# Patient Record
Sex: FEMALE | Race: WHITE | NOT HISPANIC OR LATINO | Employment: OTHER | URBAN - METROPOLITAN AREA
[De-identification: names, ages, dates, MRNs, and addresses within clinical notes are randomized per-mention and may not be internally consistent; named-entity substitution may affect disease eponyms.]

---

## 2017-01-09 ENCOUNTER — TRANSCRIBE ORDERS (OUTPATIENT)
Dept: ADMINISTRATIVE | Facility: HOSPITAL | Age: 77
End: 2017-01-09

## 2017-01-09 DIAGNOSIS — Z12.31 OTHER SCREENING MAMMOGRAM: ICD-10-CM

## 2017-01-09 DIAGNOSIS — N64.4 BREAST PAIN, LEFT: Primary | ICD-10-CM

## 2017-01-24 ENCOUNTER — HOSPITAL ENCOUNTER (OUTPATIENT)
Dept: ULTRASOUND IMAGING | Facility: CLINIC | Age: 77
Discharge: HOME | End: 2017-01-24
Payer: MEDICARE

## 2017-01-24 ENCOUNTER — HOSPITAL ENCOUNTER (OUTPATIENT)
Dept: MAMMOGRAPHY | Facility: CLINIC | Age: 77
Discharge: HOME | End: 2017-01-24
Payer: MEDICARE

## 2017-01-24 DIAGNOSIS — N64.4 MASTODYNIA: ICD-10-CM

## 2017-01-24 DIAGNOSIS — Z12.31 OTHER SCREENING MAMMOGRAM: ICD-10-CM

## 2017-01-24 DIAGNOSIS — N64.4 BREAST PAIN, LEFT: ICD-10-CM

## 2017-01-24 DIAGNOSIS — Z12.31 ENCOUNTER FOR SCREENING MAMMOGRAM FOR MALIGNANT NEOPLASM OF BREAST: ICD-10-CM

## 2017-01-24 DIAGNOSIS — N63.0 BREAST LUMP: ICD-10-CM

## 2017-01-24 PROCEDURE — G0202 SCR MAMMO BI INCL CAD: HCPCS

## 2017-01-24 PROCEDURE — G0206 DX MAMMO INCL CAD UNI: HCPCS

## 2017-01-24 PROCEDURE — 76642 ULTRASOUND BREAST LIMITED: CPT

## 2017-01-31 ENCOUNTER — ALLSCRIPTS OFFICE VISIT (OUTPATIENT)
Dept: OTHER | Facility: OTHER | Age: 77
End: 2017-01-31

## 2017-02-20 ENCOUNTER — GENERIC CONVERSION - ENCOUNTER (OUTPATIENT)
Dept: OTHER | Facility: OTHER | Age: 77
End: 2017-02-20

## 2017-04-12 ENCOUNTER — ALLSCRIPTS OFFICE VISIT (OUTPATIENT)
Dept: OTHER | Facility: OTHER | Age: 77
End: 2017-04-12

## 2017-04-12 DIAGNOSIS — D64.9 ANEMIA: ICD-10-CM

## 2017-04-12 DIAGNOSIS — D50.9 IRON DEFICIENCY ANEMIA: ICD-10-CM

## 2017-04-12 DIAGNOSIS — K59.09 OTHER CONSTIPATION: ICD-10-CM

## 2017-08-23 ENCOUNTER — HOSPITAL ENCOUNTER (INPATIENT)
Facility: HOSPITAL | Age: 77
LOS: 8 days | Discharge: NURSING, REHAB OR LONG-TERM CARE | DRG: 885 | End: 2017-08-31
Attending: EMERGENCY MEDICINE | Admitting: PSYCHIATRY & NEUROLOGY
Payer: MEDICARE

## 2017-08-23 DIAGNOSIS — I48.91 ATRIAL FIBRILLATION (HCC): Chronic | ICD-10-CM

## 2017-08-23 DIAGNOSIS — R41.82 ALTERED MENTAL STATUS: Primary | ICD-10-CM

## 2017-08-23 DIAGNOSIS — F31.9 BIPOLAR DISORDER (HCC): Chronic | ICD-10-CM

## 2017-08-23 DIAGNOSIS — N39.0 URINARY TRACT INFECTION: ICD-10-CM

## 2017-08-23 LAB
ALBUMIN SERPL BCP-MCNC: 2.8 G/DL (ref 3.5–5)
ALP SERPL-CCNC: 61 U/L (ref 46–116)
ALT SERPL W P-5'-P-CCNC: 11 U/L (ref 12–78)
AMPHETAMINES SERPL QL SCN: NEGATIVE
ANION GAP SERPL CALCULATED.3IONS-SCNC: 9 MMOL/L (ref 4–13)
ANISOCYTOSIS BLD QL SMEAR: PRESENT
AST SERPL W P-5'-P-CCNC: 18 U/L (ref 5–45)
BACTERIA UR QL AUTO: ABNORMAL /HPF
BARBITURATES UR QL: NEGATIVE
BASOPHILS # BLD MANUAL: 0 THOUSAND/UL (ref 0–0.1)
BASOPHILS NFR MAR MANUAL: 0 % (ref 0–1)
BENZODIAZ UR QL: NEGATIVE
BILIRUB SERPL-MCNC: 0.27 MG/DL (ref 0.2–1)
BILIRUB UR QL STRIP: NEGATIVE
BUN SERPL-MCNC: 22 MG/DL (ref 5–25)
CALCIUM SERPL-MCNC: 9.2 MG/DL (ref 8.3–10.1)
CHLORIDE SERPL-SCNC: 105 MMOL/L (ref 100–108)
CLARITY UR: ABNORMAL
CO2 SERPL-SCNC: 24 MMOL/L (ref 21–32)
COCAINE UR QL: NEGATIVE
COLOR UR: YELLOW
COLOR, POC: NORMAL
CREAT SERPL-MCNC: 1 MG/DL (ref 0.6–1.3)
DIGOXIN SERPL-MCNC: 1.2 NG/ML (ref 0.8–2)
EOSINOPHIL # BLD MANUAL: 0.28 THOUSAND/UL (ref 0–0.4)
EOSINOPHIL NFR BLD MANUAL: 5 % (ref 0–6)
ERYTHROCYTE [DISTWIDTH] IN BLOOD BY AUTOMATED COUNT: 14 % (ref 11.6–15.1)
ETHANOL EXG-MCNC: 0 MG/DL
GFR SERPL CREATININE-BSD FRML MDRD: 54 ML/MIN/1.73SQ M
GLUCOSE SERPL-MCNC: 111 MG/DL (ref 65–140)
GLUCOSE UR STRIP-MCNC: NEGATIVE MG/DL
HCT VFR BLD AUTO: 40.9 % (ref 34.8–46.1)
HGB BLD-MCNC: 13.1 G/DL (ref 11.5–15.4)
HGB UR QL STRIP.AUTO: ABNORMAL
HYALINE CASTS #/AREA URNS LPF: ABNORMAL /LPF
KETONES UR STRIP-MCNC: ABNORMAL MG/DL
LEUKOCYTE ESTERASE UR QL STRIP: ABNORMAL
LYMPHOCYTES # BLD AUTO: 2.44 THOUSAND/UL (ref 0.6–4.47)
LYMPHOCYTES # BLD AUTO: 43 % (ref 14–44)
MCH RBC QN AUTO: 29.8 PG (ref 26.8–34.3)
MCHC RBC AUTO-ENTMCNC: 32 G/DL (ref 31.4–37.4)
MCV RBC AUTO: 93 FL (ref 82–98)
METHADONE UR QL: NEGATIVE
MONOCYTES # BLD AUTO: 0.4 THOUSAND/UL (ref 0–1.22)
MONOCYTES NFR BLD: 7 % (ref 4–12)
NEUTROPHILS # BLD MANUAL: 2.56 THOUSAND/UL (ref 1.85–7.62)
NEUTS SEG NFR BLD AUTO: 45 % (ref 43–75)
NITRITE UR QL STRIP: POSITIVE
NON-SQ EPI CELLS URNS QL MICRO: ABNORMAL /HPF
NRBC BLD AUTO-RTO: 0 /100 WBCS
OPIATES UR QL SCN: NEGATIVE
PCP UR QL: NEGATIVE
PH UR STRIP.AUTO: 6 [PH] (ref 4.5–8)
PLATELET # BLD AUTO: 174 THOUSANDS/UL (ref 149–390)
PLATELET BLD QL SMEAR: ADEQUATE
PMV BLD AUTO: 10 FL (ref 8.9–12.7)
POTASSIUM SERPL-SCNC: 4.4 MMOL/L (ref 3.5–5.3)
PROT SERPL-MCNC: 7.6 G/DL (ref 6.4–8.2)
PROT UR STRIP-MCNC: >=300 MG/DL
RBC # BLD AUTO: 4.4 MILLION/UL (ref 3.81–5.12)
RBC #/AREA URNS AUTO: ABNORMAL /HPF
SODIUM SERPL-SCNC: 138 MMOL/L (ref 136–145)
SP GR UR STRIP.AUTO: >=1.03 (ref 1–1.03)
THC UR QL: NEGATIVE
TOTAL CELLS COUNTED SPEC: 100
TSH SERPL DL<=0.05 MIU/L-ACNC: 5.6 UIU/ML (ref 0.36–3.74)
UROBILINOGEN UR QL STRIP.AUTO: 1 E.U./DL
WBC # BLD AUTO: 5.68 THOUSAND/UL (ref 4.31–10.16)
WBC #/AREA URNS AUTO: ABNORMAL /HPF

## 2017-08-23 PROCEDURE — 81001 URINALYSIS AUTO W/SCOPE: CPT

## 2017-08-23 PROCEDURE — 36415 COLL VENOUS BLD VENIPUNCTURE: CPT

## 2017-08-23 PROCEDURE — 80307 DRUG TEST PRSMV CHEM ANLYZR: CPT | Performed by: EMERGENCY MEDICINE

## 2017-08-23 PROCEDURE — 84443 ASSAY THYROID STIM HORMONE: CPT | Performed by: EMERGENCY MEDICINE

## 2017-08-23 PROCEDURE — 85007 BL SMEAR W/DIFF WBC COUNT: CPT | Performed by: EMERGENCY MEDICINE

## 2017-08-23 PROCEDURE — 93005 ELECTROCARDIOGRAM TRACING: CPT | Performed by: EMERGENCY MEDICINE

## 2017-08-23 PROCEDURE — 87077 CULTURE AEROBIC IDENTIFY: CPT

## 2017-08-23 PROCEDURE — 81002 URINALYSIS NONAUTO W/O SCOPE: CPT | Performed by: EMERGENCY MEDICINE

## 2017-08-23 PROCEDURE — 87086 URINE CULTURE/COLONY COUNT: CPT

## 2017-08-23 PROCEDURE — 96360 HYDRATION IV INFUSION INIT: CPT

## 2017-08-23 PROCEDURE — 99285 EMERGENCY DEPT VISIT HI MDM: CPT

## 2017-08-23 PROCEDURE — 85027 COMPLETE CBC AUTOMATED: CPT | Performed by: EMERGENCY MEDICINE

## 2017-08-23 PROCEDURE — 80053 COMPREHEN METABOLIC PANEL: CPT | Performed by: EMERGENCY MEDICINE

## 2017-08-23 PROCEDURE — 82075 ASSAY OF BREATH ETHANOL: CPT | Performed by: EMERGENCY MEDICINE

## 2017-08-23 PROCEDURE — 87186 SC STD MICRODIL/AGAR DIL: CPT

## 2017-08-23 PROCEDURE — 80162 ASSAY OF DIGOXIN TOTAL: CPT | Performed by: EMERGENCY MEDICINE

## 2017-08-23 RX ORDER — NITROFURANTOIN 25; 75 MG/1; MG/1
100 CAPSULE ORAL 2 TIMES DAILY WITH MEALS
Status: DISPENSED | OUTPATIENT
Start: 2017-08-23 | End: 2017-08-30

## 2017-08-23 RX ORDER — ONDANSETRON 4 MG/1
4 TABLET, FILM COATED ORAL EVERY 8 HOURS PRN
Status: ON HOLD | COMMUNITY
End: 2017-08-31

## 2017-08-23 RX ORDER — INSULIN GLARGINE 100 [IU]/ML
5 INJECTION, SOLUTION SUBCUTANEOUS
COMMUNITY
End: 2017-08-31 | Stop reason: HOSPADM

## 2017-08-23 RX ORDER — PROPRANOLOL HYDROCHLORIDE 10 MG/1
10 TABLET ORAL 2 TIMES DAILY
COMMUNITY
End: 2017-08-31 | Stop reason: HOSPADM

## 2017-08-23 RX ORDER — METHIMAZOLE 5 MG/1
2.5 TABLET ORAL 3 TIMES WEEKLY
COMMUNITY
End: 2017-08-31 | Stop reason: HOSPADM

## 2017-08-23 RX ORDER — RAMIPRIL 10 MG/1
10 CAPSULE ORAL DAILY
COMMUNITY
End: 2017-08-31 | Stop reason: HOSPADM

## 2017-08-23 RX ORDER — LORAZEPAM 0.5 MG/1
TABLET ORAL EVERY 12 HOURS PRN
COMMUNITY
End: 2017-08-31 | Stop reason: HOSPADM

## 2017-08-23 RX ORDER — FERROUS SULFATE 325(65) MG
325 TABLET ORAL
COMMUNITY
End: 2017-08-31 | Stop reason: HOSPADM

## 2017-08-23 RX ORDER — DIVALPROEX SODIUM 250 MG/1
250 TABLET, DELAYED RELEASE ORAL EVERY 12 HOURS SCHEDULED
COMMUNITY
End: 2017-08-31 | Stop reason: HOSPADM

## 2017-08-23 RX ORDER — LOPERAMIDE HYDROCHLORIDE 2 MG/1
2 CAPSULE ORAL AS NEEDED
Status: ON HOLD | COMMUNITY
End: 2017-08-31

## 2017-08-23 RX ADMIN — SODIUM CHLORIDE 1000 ML: 0.9 INJECTION, SOLUTION INTRAVENOUS at 15:45

## 2017-08-23 RX ADMIN — NITROFURANTOIN (MONOHYDRATE/MACROCRYSTALS) 100 MG: 75; 25 CAPSULE ORAL at 19:33

## 2017-08-24 PROBLEM — N39.0 URINARY TRACT INFECTION, ACUTE: Status: ACTIVE | Noted: 2017-08-24

## 2017-08-24 LAB
ALBUMIN SERPL BCP-MCNC: 2.6 G/DL (ref 3.5–5)
ALP SERPL-CCNC: 61 U/L (ref 46–116)
ALT SERPL W P-5'-P-CCNC: 13 U/L (ref 12–78)
ANION GAP SERPL CALCULATED.3IONS-SCNC: 6 MMOL/L (ref 4–13)
AST SERPL W P-5'-P-CCNC: 17 U/L (ref 5–45)
BASOPHILS # BLD AUTO: 0.02 THOUSANDS/ΜL (ref 0–0.1)
BASOPHILS NFR BLD AUTO: 0 % (ref 0–1)
BILIRUB SERPL-MCNC: 0.22 MG/DL (ref 0.2–1)
BUN SERPL-MCNC: 15 MG/DL (ref 5–25)
CALCIUM SERPL-MCNC: 8.6 MG/DL (ref 8.3–10.1)
CHLORIDE SERPL-SCNC: 108 MMOL/L (ref 100–108)
CO2 SERPL-SCNC: 24 MMOL/L (ref 21–32)
CREAT SERPL-MCNC: 0.66 MG/DL (ref 0.6–1.3)
EOSINOPHIL # BLD AUTO: 0.16 THOUSAND/ΜL (ref 0–0.61)
EOSINOPHIL NFR BLD AUTO: 3 % (ref 0–6)
ERYTHROCYTE [DISTWIDTH] IN BLOOD BY AUTOMATED COUNT: 13.6 % (ref 11.6–15.1)
GFR SERPL CREATININE-BSD FRML MDRD: 85 ML/MIN/1.73SQ M
GLUCOSE SERPL-MCNC: 129 MG/DL (ref 65–140)
GLUCOSE SERPL-MCNC: 174 MG/DL (ref 65–140)
GLUCOSE SERPL-MCNC: 177 MG/DL (ref 65–140)
GLUCOSE SERPL-MCNC: 182 MG/DL (ref 65–140)
GLUCOSE SERPL-MCNC: 194 MG/DL (ref 65–140)
GLUCOSE SERPL-MCNC: 213 MG/DL (ref 65–140)
HCT VFR BLD AUTO: 36.9 % (ref 34.8–46.1)
HGB BLD-MCNC: 11.9 G/DL (ref 11.5–15.4)
LYMPHOCYTES # BLD AUTO: 1.41 THOUSANDS/ΜL (ref 0.6–4.47)
LYMPHOCYTES NFR BLD AUTO: 25 % (ref 14–44)
MCH RBC QN AUTO: 29.3 PG (ref 26.8–34.3)
MCHC RBC AUTO-ENTMCNC: 32.2 G/DL (ref 31.4–37.4)
MCV RBC AUTO: 91 FL (ref 82–98)
MONOCYTES # BLD AUTO: 0.54 THOUSAND/ΜL (ref 0.17–1.22)
MONOCYTES NFR BLD AUTO: 9 % (ref 4–12)
NEUTROPHILS # BLD AUTO: 3.61 THOUSANDS/ΜL (ref 1.85–7.62)
NEUTS SEG NFR BLD AUTO: 63 % (ref 43–75)
NRBC BLD AUTO-RTO: 0 /100 WBCS
PLATELET # BLD AUTO: 165 THOUSANDS/UL (ref 149–390)
PMV BLD AUTO: 9.8 FL (ref 8.9–12.7)
POTASSIUM SERPL-SCNC: 4 MMOL/L (ref 3.5–5.3)
PROT SERPL-MCNC: 7 G/DL (ref 6.4–8.2)
RBC # BLD AUTO: 4.06 MILLION/UL (ref 3.81–5.12)
SODIUM SERPL-SCNC: 138 MMOL/L (ref 136–145)
TSH SERPL DL<=0.05 MIU/L-ACNC: 8.55 UIU/ML (ref 0.36–3.74)
WBC # BLD AUTO: 5.76 THOUSAND/UL (ref 4.31–10.16)

## 2017-08-24 PROCEDURE — 84443 ASSAY THYROID STIM HORMONE: CPT | Performed by: PSYCHIATRY & NEUROLOGY

## 2017-08-24 PROCEDURE — 80053 COMPREHEN METABOLIC PANEL: CPT | Performed by: PSYCHIATRY & NEUROLOGY

## 2017-08-24 PROCEDURE — 85025 COMPLETE CBC W/AUTO DIFF WBC: CPT | Performed by: PSYCHIATRY & NEUROLOGY

## 2017-08-24 PROCEDURE — 82948 REAGENT STRIP/BLOOD GLUCOSE: CPT

## 2017-08-24 RX ORDER — DIGOXIN 125 MCG
125 TABLET ORAL DAILY
Status: DISCONTINUED | OUTPATIENT
Start: 2017-08-24 | End: 2017-08-31 | Stop reason: HOSPADM

## 2017-08-24 RX ORDER — RAMIPRIL 5 MG/1
10 CAPSULE ORAL DAILY
Status: DISCONTINUED | OUTPATIENT
Start: 2017-08-24 | End: 2017-08-31 | Stop reason: HOSPADM

## 2017-08-24 RX ORDER — FERROUS SULFATE 325(65) MG
325 TABLET ORAL
Status: DISCONTINUED | OUTPATIENT
Start: 2017-08-24 | End: 2017-08-31 | Stop reason: HOSPADM

## 2017-08-24 RX ORDER — ACETAMINOPHEN 325 MG/1
650 TABLET ORAL EVERY 4 HOURS PRN
Status: DISCONTINUED | OUTPATIENT
Start: 2017-08-24 | End: 2017-08-31 | Stop reason: HOSPADM

## 2017-08-24 RX ORDER — DIVALPROEX SODIUM 500 MG/1
500 TABLET, EXTENDED RELEASE ORAL 2 TIMES DAILY
Status: DISCONTINUED | OUTPATIENT
Start: 2017-08-24 | End: 2017-08-24

## 2017-08-24 RX ORDER — LORAZEPAM 1 MG/1
1 TABLET ORAL EVERY 4 HOURS PRN
Status: DISCONTINUED | OUTPATIENT
Start: 2017-08-24 | End: 2017-08-31 | Stop reason: HOSPADM

## 2017-08-24 RX ORDER — PROPRANOLOL HYDROCHLORIDE 10 MG/1
10 TABLET ORAL 2 TIMES DAILY
Status: DISCONTINUED | OUTPATIENT
Start: 2017-08-24 | End: 2017-08-31 | Stop reason: HOSPADM

## 2017-08-24 RX ORDER — MAGNESIUM HYDROXIDE/ALUMINUM HYDROXICE/SIMETHICONE 120; 1200; 1200 MG/30ML; MG/30ML; MG/30ML
30 SUSPENSION ORAL EVERY 4 HOURS PRN
Status: DISCONTINUED | OUTPATIENT
Start: 2017-08-24 | End: 2017-08-31 | Stop reason: HOSPADM

## 2017-08-24 RX ORDER — HALOPERIDOL 5 MG
5 TABLET ORAL EVERY 8 HOURS PRN
Status: DISCONTINUED | OUTPATIENT
Start: 2017-08-24 | End: 2017-08-31 | Stop reason: HOSPADM

## 2017-08-24 RX ORDER — METHIMAZOLE 5 MG/1
2.5 TABLET ORAL 3 TIMES WEEKLY
Status: DISCONTINUED | OUTPATIENT
Start: 2017-08-24 | End: 2017-08-31 | Stop reason: HOSPADM

## 2017-08-24 RX ORDER — DIVALPROEX SODIUM 125 MG/1
500 CAPSULE, COATED PELLETS ORAL EVERY 12 HOURS SCHEDULED
Status: DISCONTINUED | OUTPATIENT
Start: 2017-08-24 | End: 2017-08-27

## 2017-08-24 RX ORDER — OLANZAPINE 10 MG/1
10 INJECTION, POWDER, LYOPHILIZED, FOR SOLUTION INTRAMUSCULAR 2 TIMES DAILY PRN
Status: DISCONTINUED | OUTPATIENT
Start: 2017-08-24 | End: 2017-08-31 | Stop reason: HOSPADM

## 2017-08-24 RX ORDER — INSULIN GLARGINE 100 [IU]/ML
5 INJECTION, SOLUTION SUBCUTANEOUS
Status: DISCONTINUED | OUTPATIENT
Start: 2017-08-24 | End: 2017-08-31 | Stop reason: HOSPADM

## 2017-08-24 RX ORDER — BENZTROPINE MESYLATE 1 MG/ML
1 INJECTION INTRAMUSCULAR; INTRAVENOUS EVERY 8 HOURS PRN
Status: DISCONTINUED | OUTPATIENT
Start: 2017-08-24 | End: 2017-08-31 | Stop reason: HOSPADM

## 2017-08-24 RX ORDER — BENZTROPINE MESYLATE 1 MG/1
1 TABLET ORAL EVERY 6 HOURS PRN
Status: DISCONTINUED | OUTPATIENT
Start: 2017-08-24 | End: 2017-08-31 | Stop reason: HOSPADM

## 2017-08-24 RX ORDER — OLANZAPINE 5 MG/1
5 TABLET ORAL
Status: DISCONTINUED | OUTPATIENT
Start: 2017-08-24 | End: 2017-08-25

## 2017-08-24 RX ADMIN — RAMIPRIL 10 MG: 5 CAPSULE ORAL at 08:49

## 2017-08-24 RX ADMIN — NITROFURANTOIN (MONOHYDRATE/MACROCRYSTALS) 100 MG: 75; 25 CAPSULE ORAL at 10:07

## 2017-08-24 RX ADMIN — Medication 325 MG: at 08:50

## 2017-08-24 RX ADMIN — RIVAROXABAN 20 MG: 20 TABLET, FILM COATED ORAL at 08:50

## 2017-08-24 RX ADMIN — PROPRANOLOL HYDROCHLORIDE 10 MG: 10 TABLET ORAL at 17:18

## 2017-08-24 RX ADMIN — DIVALPROEX SODIUM 500 MG: 125 CAPSULE, COATED PELLETS ORAL at 10:08

## 2017-08-24 RX ADMIN — OLANZAPINE 5 MG: 5 TABLET, FILM COATED ORAL at 01:11

## 2017-08-24 RX ADMIN — INSULIN GLARGINE 5 UNITS: 100 INJECTION, SOLUTION SUBCUTANEOUS at 01:15

## 2017-08-24 RX ADMIN — INSULIN GLARGINE 5 UNITS: 100 INJECTION, SOLUTION SUBCUTANEOUS at 21:46

## 2017-08-24 RX ADMIN — METHIMAZOLE 2.5 MG: 5 TABLET ORAL at 01:11

## 2017-08-24 RX ADMIN — NITROFURANTOIN (MONOHYDRATE/MACROCRYSTALS) 100 MG: 75; 25 CAPSULE ORAL at 17:18

## 2017-08-24 RX ADMIN — METFORMIN HYDROCHLORIDE 1000 MG: 500 TABLET, FILM COATED ORAL at 08:49

## 2017-08-24 RX ADMIN — DIGOXIN 125 MCG: 0.12 TABLET ORAL at 08:49

## 2017-08-24 RX ADMIN — PROPRANOLOL HYDROCHLORIDE 10 MG: 10 TABLET ORAL at 08:49

## 2017-08-24 RX ADMIN — METFORMIN HYDROCHLORIDE 1000 MG: 500 TABLET, FILM COATED ORAL at 17:18

## 2017-08-25 LAB
BACTERIA UR CULT: NORMAL
GLUCOSE SERPL-MCNC: 138 MG/DL (ref 65–140)
GLUCOSE SERPL-MCNC: 155 MG/DL (ref 65–140)
GLUCOSE SERPL-MCNC: 161 MG/DL (ref 65–140)
GLUCOSE SERPL-MCNC: 193 MG/DL (ref 65–140)

## 2017-08-25 PROCEDURE — 82948 REAGENT STRIP/BLOOD GLUCOSE: CPT

## 2017-08-25 RX ADMIN — METFORMIN HYDROCHLORIDE 1000 MG: 500 TABLET, FILM COATED ORAL at 17:15

## 2017-08-25 RX ADMIN — DIVALPROEX SODIUM 500 MG: 125 CAPSULE, COATED PELLETS ORAL at 21:11

## 2017-08-25 RX ADMIN — PROPRANOLOL HYDROCHLORIDE 10 MG: 10 TABLET ORAL at 17:14

## 2017-08-25 RX ADMIN — NITROFURANTOIN (MONOHYDRATE/MACROCRYSTALS) 100 MG: 75; 25 CAPSULE ORAL at 17:15

## 2017-08-25 RX ADMIN — INSULIN GLARGINE 5 UNITS: 100 INJECTION, SOLUTION SUBCUTANEOUS at 21:11

## 2017-08-26 PROBLEM — F02.81 LATE ONSET ALZHEIMER'S DISEASE WITH BEHAVIORAL DISTURBANCE (HCC): Chronic | Status: ACTIVE | Noted: 2017-08-26

## 2017-08-26 PROBLEM — G30.1 LATE ONSET ALZHEIMER'S DISEASE WITH BEHAVIORAL DISTURBANCE (HCC): Chronic | Status: ACTIVE | Noted: 2017-08-26

## 2017-08-26 PROBLEM — F02.818 LATE ONSET ALZHEIMER'S DISEASE WITH BEHAVIORAL DISTURBANCE (HCC): Chronic | Status: ACTIVE | Noted: 2017-08-26

## 2017-08-26 LAB
GLUCOSE SERPL-MCNC: 130 MG/DL (ref 65–140)
GLUCOSE SERPL-MCNC: 188 MG/DL (ref 65–140)
GLUCOSE SERPL-MCNC: 198 MG/DL (ref 65–140)
GLUCOSE SERPL-MCNC: 202 MG/DL (ref 65–140)
VALPROATE SERPL-MCNC: 63 UG/ML (ref 50–100)

## 2017-08-26 PROCEDURE — 92610 EVALUATE SWALLOWING FUNCTION: CPT

## 2017-08-26 PROCEDURE — 82948 REAGENT STRIP/BLOOD GLUCOSE: CPT

## 2017-08-26 PROCEDURE — 80164 ASSAY DIPROPYLACETIC ACD TOT: CPT | Performed by: PSYCHIATRY & NEUROLOGY

## 2017-08-26 RX ADMIN — RAMIPRIL 10 MG: 5 CAPSULE ORAL at 08:05

## 2017-08-26 RX ADMIN — NITROFURANTOIN (MONOHYDRATE/MACROCRYSTALS) 100 MG: 75; 25 CAPSULE ORAL at 08:04

## 2017-08-26 RX ADMIN — Medication 325 MG: at 08:04

## 2017-08-26 RX ADMIN — NITROFURANTOIN (MONOHYDRATE/MACROCRYSTALS) 100 MG: 75; 25 CAPSULE ORAL at 17:32

## 2017-08-26 RX ADMIN — METFORMIN HYDROCHLORIDE 1000 MG: 500 TABLET, FILM COATED ORAL at 17:32

## 2017-08-26 RX ADMIN — DIVALPROEX SODIUM 500 MG: 125 CAPSULE, COATED PELLETS ORAL at 08:03

## 2017-08-26 RX ADMIN — PROPRANOLOL HYDROCHLORIDE 10 MG: 10 TABLET ORAL at 17:32

## 2017-08-26 RX ADMIN — RIVAROXABAN 20 MG: 20 TABLET, FILM COATED ORAL at 08:04

## 2017-08-26 RX ADMIN — DIGOXIN 125 MCG: 0.12 TABLET ORAL at 08:04

## 2017-08-26 RX ADMIN — METFORMIN HYDROCHLORIDE 1000 MG: 500 TABLET, FILM COATED ORAL at 08:04

## 2017-08-26 RX ADMIN — ACETAMINOPHEN 650 MG: 325 TABLET, FILM COATED ORAL at 08:01

## 2017-08-26 RX ADMIN — INSULIN GLARGINE 5 UNITS: 100 INJECTION, SOLUTION SUBCUTANEOUS at 21:22

## 2017-08-26 RX ADMIN — DIVALPROEX SODIUM 500 MG: 125 CAPSULE, COATED PELLETS ORAL at 21:22

## 2017-08-27 LAB
GLUCOSE SERPL-MCNC: 187 MG/DL (ref 65–140)
GLUCOSE SERPL-MCNC: 207 MG/DL (ref 65–140)
GLUCOSE SERPL-MCNC: 237 MG/DL (ref 65–140)

## 2017-08-27 PROCEDURE — 82948 REAGENT STRIP/BLOOD GLUCOSE: CPT

## 2017-08-27 RX ORDER — DIVALPROEX SODIUM 125 MG/1
250 CAPSULE, COATED PELLETS ORAL EVERY 12 HOURS SCHEDULED
Status: DISCONTINUED | OUTPATIENT
Start: 2017-08-27 | End: 2017-08-28

## 2017-08-27 RX ADMIN — METFORMIN HYDROCHLORIDE 1000 MG: 500 TABLET, FILM COATED ORAL at 08:33

## 2017-08-27 RX ADMIN — RIVAROXABAN 20 MG: 20 TABLET, FILM COATED ORAL at 08:34

## 2017-08-27 RX ADMIN — NITROFURANTOIN (MONOHYDRATE/MACROCRYSTALS) 100 MG: 75; 25 CAPSULE ORAL at 08:34

## 2017-08-27 RX ADMIN — NITROFURANTOIN (MONOHYDRATE/MACROCRYSTALS) 100 MG: 75; 25 CAPSULE ORAL at 17:37

## 2017-08-27 RX ADMIN — DIVALPROEX SODIUM 250 MG: 125 CAPSULE, COATED PELLETS ORAL at 21:06

## 2017-08-27 RX ADMIN — METFORMIN HYDROCHLORIDE 1000 MG: 500 TABLET, FILM COATED ORAL at 17:37

## 2017-08-27 RX ADMIN — PROPRANOLOL HYDROCHLORIDE 10 MG: 10 TABLET ORAL at 17:37

## 2017-08-27 RX ADMIN — DIVALPROEX SODIUM 500 MG: 125 CAPSULE, COATED PELLETS ORAL at 08:33

## 2017-08-27 RX ADMIN — Medication 325 MG: at 08:34

## 2017-08-27 RX ADMIN — INSULIN GLARGINE 5 UNITS: 100 INJECTION, SOLUTION SUBCUTANEOUS at 21:10

## 2017-08-27 RX ADMIN — DIGOXIN 125 MCG: 0.12 TABLET ORAL at 08:33

## 2017-08-28 LAB
GLUCOSE SERPL-MCNC: 147 MG/DL (ref 65–140)
GLUCOSE SERPL-MCNC: 212 MG/DL (ref 65–140)
GLUCOSE SERPL-MCNC: 232 MG/DL (ref 65–140)
GLUCOSE SERPL-MCNC: 241 MG/DL (ref 65–140)

## 2017-08-28 PROCEDURE — 82948 REAGENT STRIP/BLOOD GLUCOSE: CPT

## 2017-08-28 RX ORDER — DIVALPROEX SODIUM 125 MG/1
125 CAPSULE, COATED PELLETS ORAL EVERY 12 HOURS SCHEDULED
Status: DISCONTINUED | OUTPATIENT
Start: 2017-08-28 | End: 2017-08-29

## 2017-08-28 RX ADMIN — DIVALPROEX SODIUM 250 MG: 125 CAPSULE, COATED PELLETS ORAL at 08:10

## 2017-08-28 RX ADMIN — METFORMIN HYDROCHLORIDE 1000 MG: 500 TABLET, FILM COATED ORAL at 16:57

## 2017-08-28 RX ADMIN — Medication 325 MG: at 08:11

## 2017-08-28 RX ADMIN — METFORMIN HYDROCHLORIDE 1000 MG: 500 TABLET, FILM COATED ORAL at 08:11

## 2017-08-28 RX ADMIN — RAMIPRIL 10 MG: 5 CAPSULE ORAL at 08:09

## 2017-08-28 RX ADMIN — PROPRANOLOL HYDROCHLORIDE 10 MG: 10 TABLET ORAL at 17:00

## 2017-08-28 RX ADMIN — DIVALPROEX SODIUM 125 MG: 125 CAPSULE, COATED PELLETS ORAL at 21:13

## 2017-08-28 RX ADMIN — METHIMAZOLE 2.5 MG: 5 TABLET ORAL at 08:10

## 2017-08-28 RX ADMIN — NITROFURANTOIN (MONOHYDRATE/MACROCRYSTALS) 100 MG: 75; 25 CAPSULE ORAL at 16:59

## 2017-08-28 RX ADMIN — RIVAROXABAN 20 MG: 20 TABLET, FILM COATED ORAL at 08:10

## 2017-08-28 RX ADMIN — NITROFURANTOIN (MONOHYDRATE/MACROCRYSTALS) 100 MG: 75; 25 CAPSULE ORAL at 08:09

## 2017-08-28 RX ADMIN — INSULIN GLARGINE 5 UNITS: 100 INJECTION, SOLUTION SUBCUTANEOUS at 21:12

## 2017-08-28 RX ADMIN — DIGOXIN 125 MCG: 0.12 TABLET ORAL at 08:10

## 2017-08-28 RX ADMIN — PROPRANOLOL HYDROCHLORIDE 10 MG: 10 TABLET ORAL at 08:11

## 2017-08-29 ENCOUNTER — APPOINTMENT (INPATIENT)
Dept: RADIOLOGY | Facility: HOSPITAL | Age: 77
DRG: 885 | End: 2017-08-29
Payer: MEDICARE

## 2017-08-29 LAB
GLUCOSE SERPL-MCNC: 177 MG/DL (ref 65–140)
GLUCOSE SERPL-MCNC: 196 MG/DL (ref 65–140)
GLUCOSE SERPL-MCNC: 209 MG/DL (ref 65–140)
GLUCOSE SERPL-MCNC: 213 MG/DL (ref 65–140)

## 2017-08-29 PROCEDURE — 82948 REAGENT STRIP/BLOOD GLUCOSE: CPT

## 2017-08-29 PROCEDURE — 70551 MRI BRAIN STEM W/O DYE: CPT

## 2017-08-29 RX ADMIN — METFORMIN HYDROCHLORIDE 1000 MG: 500 TABLET, FILM COATED ORAL at 16:55

## 2017-08-29 RX ADMIN — DIGOXIN 125 MCG: 0.12 TABLET ORAL at 08:43

## 2017-08-29 RX ADMIN — INSULIN GLARGINE 5 UNITS: 100 INJECTION, SOLUTION SUBCUTANEOUS at 21:24

## 2017-08-29 RX ADMIN — ACETAMINOPHEN 650 MG: 325 TABLET, FILM COATED ORAL at 17:04

## 2017-08-29 RX ADMIN — Medication 325 MG: at 08:44

## 2017-08-29 RX ADMIN — PROPRANOLOL HYDROCHLORIDE 10 MG: 10 TABLET ORAL at 08:44

## 2017-08-29 RX ADMIN — PROPRANOLOL HYDROCHLORIDE 10 MG: 10 TABLET ORAL at 17:01

## 2017-08-29 RX ADMIN — NITROFURANTOIN (MONOHYDRATE/MACROCRYSTALS) 100 MG: 75; 25 CAPSULE ORAL at 16:55

## 2017-08-29 RX ADMIN — RAMIPRIL 10 MG: 5 CAPSULE ORAL at 08:44

## 2017-08-29 RX ADMIN — NITROFURANTOIN (MONOHYDRATE/MACROCRYSTALS) 100 MG: 75; 25 CAPSULE ORAL at 08:44

## 2017-08-29 RX ADMIN — DIVALPROEX SODIUM 125 MG: 125 CAPSULE, COATED PELLETS ORAL at 08:47

## 2017-08-29 RX ADMIN — METFORMIN HYDROCHLORIDE 1000 MG: 500 TABLET, FILM COATED ORAL at 08:44

## 2017-08-29 RX ADMIN — RIVAROXABAN 20 MG: 20 TABLET, FILM COATED ORAL at 08:44

## 2017-08-30 LAB
GLUCOSE SERPL-MCNC: 195 MG/DL (ref 65–140)
GLUCOSE SERPL-MCNC: 236 MG/DL (ref 65–140)
GLUCOSE SERPL-MCNC: 254 MG/DL (ref 65–140)
GLUCOSE SERPL-MCNC: 359 MG/DL (ref 65–140)

## 2017-08-30 PROCEDURE — 82948 REAGENT STRIP/BLOOD GLUCOSE: CPT

## 2017-08-30 RX ADMIN — DIGOXIN 125 MCG: 0.12 TABLET ORAL at 09:08

## 2017-08-30 RX ADMIN — RIVAROXABAN 20 MG: 20 TABLET, FILM COATED ORAL at 09:08

## 2017-08-30 RX ADMIN — Medication 325 MG: at 09:08

## 2017-08-30 RX ADMIN — METHIMAZOLE 2.5 MG: 5 TABLET ORAL at 09:24

## 2017-08-30 RX ADMIN — METFORMIN HYDROCHLORIDE 1000 MG: 500 TABLET, FILM COATED ORAL at 17:29

## 2017-08-30 RX ADMIN — PROPRANOLOL HYDROCHLORIDE 10 MG: 10 TABLET ORAL at 17:29

## 2017-08-30 RX ADMIN — PROPRANOLOL HYDROCHLORIDE 10 MG: 10 TABLET ORAL at 09:08

## 2017-08-30 RX ADMIN — METFORMIN HYDROCHLORIDE 1000 MG: 500 TABLET, FILM COATED ORAL at 09:08

## 2017-08-30 RX ADMIN — INSULIN GLARGINE 5 UNITS: 100 INJECTION, SOLUTION SUBCUTANEOUS at 21:43

## 2017-08-30 RX ADMIN — RAMIPRIL 10 MG: 5 CAPSULE ORAL at 09:07

## 2017-08-31 VITALS
TEMPERATURE: 96.7 F | HEIGHT: 69 IN | RESPIRATION RATE: 17 BRPM | WEIGHT: 168 LBS | BODY MASS INDEX: 24.88 KG/M2 | HEART RATE: 110 BPM | DIASTOLIC BLOOD PRESSURE: 62 MMHG | SYSTOLIC BLOOD PRESSURE: 141 MMHG | OXYGEN SATURATION: 94 %

## 2017-08-31 LAB — GLUCOSE SERPL-MCNC: 201 MG/DL (ref 65–140)

## 2017-08-31 PROCEDURE — 82948 REAGENT STRIP/BLOOD GLUCOSE: CPT

## 2017-08-31 RX ORDER — INSULIN GLARGINE 100 [IU]/ML
5 INJECTION, SOLUTION SUBCUTANEOUS
Qty: 10 ML | Refills: 0 | Status: SHIPPED | OUTPATIENT
Start: 2017-08-31 | End: 2018-04-25

## 2017-08-31 RX ORDER — RAMIPRIL 10 MG/1
10 CAPSULE ORAL DAILY
Qty: 30 CAPSULE | Refills: 0 | Status: SHIPPED | OUTPATIENT
Start: 2017-08-31 | End: 2017-12-17

## 2017-08-31 RX ORDER — FERROUS SULFATE 325(65) MG
325 TABLET ORAL
Qty: 30 TABLET | Refills: 0 | Status: SHIPPED | OUTPATIENT
Start: 2017-08-31 | End: 2022-03-31

## 2017-08-31 RX ORDER — DIPHENOXYLATE HYDROCHLORIDE AND ATROPINE SULFATE 2.5; .025 MG/1; MG/1
1 TABLET ORAL DAILY
Qty: 30 TABLET | Refills: 0 | Status: SHIPPED | OUTPATIENT
Start: 2017-08-31 | End: 2021-04-20 | Stop reason: ALTCHOICE

## 2017-08-31 RX ORDER — LOPERAMIDE HYDROCHLORIDE 2 MG/1
2 CAPSULE ORAL AS NEEDED
Qty: 30 CAPSULE | Refills: 0 | Status: SHIPPED | OUTPATIENT
Start: 2017-08-31 | End: 2017-09-30

## 2017-08-31 RX ORDER — LORAZEPAM 1 MG/1
1 TABLET ORAL 2 TIMES DAILY PRN
Qty: 60 TABLET | Refills: 0 | Status: SHIPPED | OUTPATIENT
Start: 2017-08-31 | End: 2017-12-17

## 2017-08-31 RX ORDER — METHIMAZOLE 5 MG/1
2.5 TABLET ORAL 3 TIMES WEEKLY
Qty: 6 TABLET | Refills: 0 | Status: SHIPPED | OUTPATIENT
Start: 2017-09-01 | End: 2018-04-25

## 2017-08-31 RX ORDER — ONDANSETRON 4 MG/1
4 TABLET, FILM COATED ORAL EVERY 8 HOURS PRN
Qty: 30 TABLET | Refills: 0 | Status: SHIPPED | OUTPATIENT
Start: 2017-08-31 | End: 2021-04-20 | Stop reason: ALTCHOICE

## 2017-08-31 RX ORDER — PROPRANOLOL HYDROCHLORIDE 10 MG/1
10 TABLET ORAL 2 TIMES DAILY
Qty: 60 TABLET | Refills: 0 | Status: SHIPPED | OUTPATIENT
Start: 2017-08-31 | End: 2017-12-17

## 2017-08-31 RX ORDER — ACETAMINOPHEN 325 MG/1
325 TABLET ORAL EVERY 4 HOURS PRN
Qty: 120 TABLET | Refills: 0 | Status: SHIPPED | OUTPATIENT
Start: 2017-08-31 | End: 2017-09-30

## 2017-08-31 RX ORDER — DIGOXIN 125 MCG
125 TABLET ORAL DAILY
Qty: 30 TABLET | Refills: 0 | Status: SHIPPED | OUTPATIENT
Start: 2017-08-31 | End: 2018-04-25

## 2017-08-31 RX ADMIN — RIVAROXABAN 20 MG: 20 TABLET, FILM COATED ORAL at 08:59

## 2017-08-31 RX ADMIN — Medication 325 MG: at 08:59

## 2017-08-31 RX ADMIN — PROPRANOLOL HYDROCHLORIDE 10 MG: 10 TABLET ORAL at 08:59

## 2017-08-31 RX ADMIN — DIGOXIN 125 MCG: 0.12 TABLET ORAL at 08:59

## 2017-08-31 RX ADMIN — RAMIPRIL 10 MG: 5 CAPSULE ORAL at 08:59

## 2017-08-31 RX ADMIN — METFORMIN HYDROCHLORIDE 1000 MG: 500 TABLET, FILM COATED ORAL at 09:00

## 2017-11-09 ENCOUNTER — GENERIC CONVERSION - ENCOUNTER (OUTPATIENT)
Dept: OTHER | Facility: OTHER | Age: 77
End: 2017-11-09

## 2017-11-27 ENCOUNTER — ALLSCRIPTS OFFICE VISIT (OUTPATIENT)
Dept: OTHER | Facility: OTHER | Age: 77
End: 2017-11-27

## 2017-11-27 DIAGNOSIS — N39.0 URINARY TRACT INFECTION: ICD-10-CM

## 2017-11-28 NOTE — CONSULTS
Assessment    1  Urinary retention (788 20) (R33 9)   2  UTI (urinary tract infection) (599 0) (N39 0)    Plan  Urinary retention    · Follow-up visit in 3 months Evaluation and Treatment  Follow-up  Status: Hold For -Scheduling  Requested for: 27Nov2017   Ordered;Urinary retention; Ordered By: Charlie Larkin Performed:  Due: [id number]   · *1- [facility] INTERVENTIONAL RADIOLOGY Co-Management  *Requests placement of suprapubic tube  Status: Hold For - Scheduling Requested for: [id number]   Ordered;Urinary retention; Ordered By: Charlie Larkin Performed:  Due: [id number]  Care Summary provided  : Yes  UTI (urinary tract infection)    · [facility]; Status:Hold For - Scheduling; Requestedfor:27Nov2017;    Perform:[facility]; AUN:[id number]; Ordered;(urinary tract infection); Ordered By:Kodi Bruno; Discussion/Summary  Discussion Summary:   My impression is urinary retention, likely diabetic neuropathy, recurrent UTI  recommend obtaining an ultrasound of the kidneys and bladder to exclude any underlying pathology or kidney stones  I also recommend a referral to interventional Radiology for placement of a suprapubic tube with local anesthesia and sedation  Myrbetriq 25 mg daily can be prescribed for spasms  I would avoid anticholinergics secondary to her history of dementia  Follow-up will be approximately 6 weeks after the suprapubic tube has been placed at which time the suprapubic tube will be upsized until a 24 [employer] Nohelia tube is in place  Self Referrals:   Self Referrals: No Hospital f/u      Chief Complaint  Chief Complaint Free Text Note Form: patient presents for retention; has pantoja      History of Present Illness  HPI: Talia Tom is a 23-year-old female who unfortunately has dimension resides in a nursing facility  Her daughter who is a occupational therapist at [facility] [employer] [facility] [employer] is present with her in the office today  Her catheter has been in place for the last few months   She has had recurrent urinary tract infections  She has a longstanding history of diabetes and may have diabetic neuropathy  She has failed multiple voiding trials at the nursing facility  She now appears to be complaining of bladder pain and possibly bladder spasm  medical and surgical history is remarkable for urinary retention, diabetes, recurrent urinary tract infection, dementia  She is status post hysterectomy via a Pfannenstiel incision  surgical, family, and social histories were reviewed in [employer]  Review of Systems  Complete-Female Urology:  Constitutional: No fever, no chills, feels well, no tiredness, no recent weight gain or weight loss  Respiratory: No complaints of shortness of breath, no wheezing, no cough, no SOB on exertion, no orthopnea, no PND  Cardiovascular: No complaints of slow heart rate, no fast heart rate, no chest pain, no palpitations, no leg claudication, no lower extremity edema  Gastrointestinal: No complaints of abdominal pain, no constipation, no nausea or vomiting, no diarrhea, no bloody stools  Genitourinary: as noted in HPI  Musculoskeletal: No complaints of arthralgias, no myalgias, no joint swelling or stiffness, no limb pain or swelling  Integumentary: No complaints of skin rash or lesions, no itching, no skin wounds, no breast pain or lump  Hematologic/Lymphatic: No complaints of swollen glands, no swollen glands in the neck, does not bleed easily, does not bruise easily  Neurological: No complaints of headache, no confusion, no convulsions, no numbness, no dizziness or fainting, no tingling, no limb weakness, no difficulty walking  Active Problems    1  Anemia (285 9) (D64 9)   2  Breast nodule (793 89) (N63 0)   3  Breast pain, left (611 71) (N64 4)   4  Chronic constipation (564 00) (K59 09)   5  Encounter for screening mammogram for malignant neoplasm of breast (V76 12) (Z12 31)   6  Iron deficiency anemia (280 9) (D50 9)   7   Urinary retention (788 20) (R33 9)    Past Medical History  Active Problems And Past Medical History Reviewed: The active problems and past medical history were reviewed and updated today  Surgical History  Surgical History Reviewed: The surgical history was reviewed and updated today  Family History  Family History Reviewed: The family history was reviewed and updated today  Social History  Social History Reviewed: The social history was reviewed and updated today  The social history was reviewed and is unchanged  Current Meds   1  Acetaminophen 120 MG Rectal Suppository; INSERT 1 SUPPOSITORY RECTALLY EVERY 4 TO 6 HOURS AS NEEDED; Therapy: [id number] to Recorded   2  Acetaminophen 325 MG Oral Tablet; TAKE 1 TO 2 TABLETS EVERY 4 HOURS AS NEEDED; Therapy: [id number] to Recorded   3  Bisacodyl 10 MG Rectal Suppository; USE AS DIRECTED; Therapy: 05JZS3468 to Recorded   4  Bisacodyl EC 5 MG Oral Tablet Delayed Release; TAKE 1 TABLET DAILY AS NEEDED; Therapy: 74VWV5334 to Recorded   5  Digoxin 0 25 MG/ML Injection Solution; USE AS DIRECTED; Therapy: 19OWC0405 to Recorded   6  MethIMAzole 10 MG Oral Tablet; TAKE 12 TABLET Daily; Therapy: 32CYM1726 to (Evaluate:02Qnw7053) Recorded   7  Ondansetron 4 MG Oral Tablet Disintegrating; TAKE 1 TABLET Every 6 hours PRN nausea; Therapy: 86BPG3690 to Recorded   8  Senna Plus 8 6-50 MG Oral Tablet; TAKE 1 OR 2 TABLETS DAILY TO PREVENT CONSTIPATION; Therapy: 28EKQ2399 to Recorded   9  Voltaren 1 % Transdermal Gel; APPLY SPARINGLY TO AFFECTED AREA(S) ONCE DAILY; Therapy: 03YQO1474 to Recorded   10  Xarelto 20 MG Oral Tablet; Take 1 tablet daily; Therapy: 61SEQ1005 to Recorded    Vitals  Vital Signs    Recorded: 70KNE9998 10:41AM   Heart Rate 68   Systolic 797   Diastolic 80   Height Unobtainable Yes   Weight Unobtainable Yes       Physical Exam   Additional Exam:  On examination she is in no acute distress  She show signs of dementia  She is on a stretcher  Her abdomen is soft nontender nondistended  The bladder is nondistended  A small umbilical hernia is noted  A Pfannenstiel scars appreciated  A Agarwal catheter is in place draining clear yellow urine  There are no other abnormalities on examination noted        Signatures   Electronically signed by : Kathe Oviedo MD; Nov 27 2017 11:39AM EST                       (Author)

## 2017-12-12 RX ORDER — SODIUM CHLORIDE 9 MG/ML
75 INJECTION, SOLUTION INTRAVENOUS CONTINUOUS
Status: CANCELLED | OUTPATIENT
Start: 2017-12-18

## 2017-12-13 ENCOUNTER — TELEPHONE (OUTPATIENT)
Dept: RADIOLOGY | Facility: HOSPITAL | Age: 77
End: 2017-12-13

## 2017-12-15 ENCOUNTER — GENERIC CONVERSION - ENCOUNTER (OUTPATIENT)
Dept: OTHER | Facility: OTHER | Age: 77
End: 2017-12-15

## 2017-12-17 ENCOUNTER — APPOINTMENT (EMERGENCY)
Dept: RADIOLOGY | Facility: HOSPITAL | Age: 77
End: 2017-12-17
Payer: MEDICARE

## 2017-12-17 ENCOUNTER — HOSPITAL ENCOUNTER (EMERGENCY)
Facility: HOSPITAL | Age: 77
Discharge: NURSING, REHAB OR LONG-TERM CARE | End: 2017-12-17
Attending: EMERGENCY MEDICINE | Admitting: EMERGENCY MEDICINE
Payer: MEDICARE

## 2017-12-17 VITALS
HEART RATE: 85 BPM | OXYGEN SATURATION: 99 % | DIASTOLIC BLOOD PRESSURE: 62 MMHG | HEIGHT: 69 IN | BODY MASS INDEX: 29.62 KG/M2 | WEIGHT: 200 LBS | RESPIRATION RATE: 17 BRPM | SYSTOLIC BLOOD PRESSURE: 105 MMHG | TEMPERATURE: 98 F

## 2017-12-17 DIAGNOSIS — S09.90XA INJURY OF HEAD, INITIAL ENCOUNTER: Primary | ICD-10-CM

## 2017-12-17 PROCEDURE — 70450 CT HEAD/BRAIN W/O DYE: CPT

## 2017-12-17 PROCEDURE — 99284 EMERGENCY DEPT VISIT MOD MDM: CPT

## 2017-12-17 RX ORDER — ACETAMINOPHEN 325 MG/1
650 TABLET ORAL EVERY 6 HOURS PRN
COMMUNITY

## 2017-12-17 RX ORDER — ACETAMINOPHEN 325 MG/1
650 TABLET ORAL ONCE
Status: COMPLETED | OUTPATIENT
Start: 2017-12-17 | End: 2017-12-17

## 2017-12-17 RX ORDER — MINERAL OIL AND PETROLATUM 150; 830 MG/G; MG/G
OINTMENT OPHTHALMIC 4 TIMES DAILY PRN
COMMUNITY
End: 2021-04-20 | Stop reason: ALTCHOICE

## 2017-12-17 RX ORDER — LEVOFLOXACIN 750 MG/1
750 TABLET ORAL EVERY 24 HOURS
COMMUNITY
End: 2021-04-20 | Stop reason: ALTCHOICE

## 2017-12-17 RX ORDER — LOPERAMIDE HYDROCHLORIDE 2 MG/1
2 CAPSULE ORAL 4 TIMES DAILY PRN
COMMUNITY
End: 2021-02-15

## 2017-12-17 RX ADMIN — ACETAMINOPHEN 650 MG: 325 TABLET, FILM COATED ORAL at 11:47

## 2017-12-17 NOTE — DISCHARGE INSTRUCTIONS

## 2017-12-17 NOTE — ED ATTENDING ATTESTATION
Nilda Kulkarni MD, saw and evaluated the patient  I have discussed the patient with the resident/non-physician practitioner and agree with the resident's/non-physician practitioner's findings, Plan of Care, and MDM as documented in the resident's/non-physician practitioner's note, except where noted  All available labs and Radiology studies were reviewed  At this point I agree with the current assessment done in the Emergency Department  I have conducted an independent evaluation of this patient a history and physical is as follows:      Critical Care Time  CritCare Time    77 yo female with hx of bipolar, alzheimers, dm, hypothryoid, hx of multiple falls and fell off bed 1 foot  Pt on xarelto  Pt with mild headache, pt is nonambulatory  Vss, afebrile, lungs cta, rrr, abdomen soft nontender, no spinal tenderness, no neuro deficits  Ct head

## 2017-12-17 NOTE — ED PROVIDER NOTES
History  Chief Complaint   Patient presents with    Fall     Patient was reportedly reaching over from her bed and fell off  Has history of falls and so is on a low bed that is about 1 foot high  HPI  76 yof with alzheimers/bipolar presents after a fall in nursing  History limited due to patients demenatia/psychiatric disorder  daighter at bedside  According to staff patient fell forward out of her bed but being that she falls frequently the bed was relegated to only being 1 foot from the floor  The patient states she has a slight headache that started after the fall  She has no other complaints  She is on xarelto  No other associated symptoms  Denies cp sob prior to fall  Exam reveals no signs of trauma  She does have dependent ulcers that do not look infected on bilateral heels and sacrum  She does not walk at baseline  abd soft  Neck non tendner  A/P: fall on xarelto  No evidence of external trauma, very low fall  Will ct head        Prior to Admission Medications   Prescriptions Last Dose Informant Patient Reported? Taking?    KETOCONAZOLE, TOPICAL, (NIZORAL A-D) 1 % SHAM   Yes Yes   Sig: Apply topically   acetaminophen (TYLENOL) 325 mg tablet   Yes Yes   Sig: Take 650 mg by mouth every 6 (six) hours as needed for mild pain   artificial tear (LUBRIFRESH P M ) 83-15 % ophthalmic ointment   Yes Yes   Si (four) times a day as needed   bisacodyl (FLEET) 10 MG/30ML ENEM   No Yes   Sig: Insert 30 mL into the rectum as needed for constipation for up to 30 days   diclofenac sodium (VOLTAREN) 1 %   No Yes   Sig: Apply 4 g topically 2 (two) times a day for 30 days   digoxin (LANOXIN) 0 125 mg tablet   No Yes   Sig: Take 1 tablet by mouth daily for 30 days   ferrous sulfate 325 (65 Fe) mg tablet   No No   Sig: Take 1 tablet by mouth daily with breakfast for 30 days   insulin aspart (NovoLOG) 100 units/mL injection   Yes Yes   Sig: Inject 8 Units under the skin 3 (three) times a day before meals   insulin glargine (LANTUS) 100 units/mL subcutaneous injection   No Yes   Sig: Inject 5 Units under the skin daily at bedtime for 200 days   Patient taking differently: Inject 20 Units under the skin daily at bedtime     levofloxacin (LEVAQUIN) 750 mg tablet   Yes Yes   Sig: Take 750 mg by mouth every 24 hours   loperamide (IMODIUM) 2 mg capsule   Yes Yes   Sig: Take 2 mg by mouth 4 (four) times a day as needed for diarrhea   methimazole (TAPAZOLE) 5 mg tablet   No Yes   Sig: Take 0 5 tablets by mouth 3 (three) times a week for 30 days   Patient taking differently: Take 2 5 mg by mouth every other day     multivitamin (THERAGRAN) TABS   No Yes   Sig: Take 1 tablet by mouth daily for 30 days   mupirocin (BACTROBAN) 2 % ointment   Yes Yes   Sig: Apply topically 3 (three) times a day   ondansetron (ZOFRAN) 4 mg tablet   No Yes   Sig: Take 1 tablet by mouth every 8 (eight) hours as needed for nausea or vomiting for up to 30 days   rivaroxaban (XARELTO) 20 mg tablet   No Yes   Sig: Take 1 tablet by mouth daily for 30 days      Facility-Administered Medications: None       Past Medical History:   Diagnosis Date    Acute thyroiditis     Arthritis     Ataxia     Atrial fibrillation (HCC)     Bipolar disorder (HCC)     Bipolar disorder, unspecified     Coronary artery disease     Diabetes mellitus ([address] )     Difficulty in walking     DVT (deep venous thrombosis) (HCC)     Headache     Hyperlipidemia     Macular degeneration     Major depressive disorder     Muscle weakness     Psychiatric disorder     depression    Pulmonary embolism (HCC)     Weakness        Past Surgical History:   Procedure Laterality Date    EYE SURGERY      HYSTERECTOMY         Family History   Problem Relation Age of Onset    Coronary artery disease Father     Diabetes Sister     Diabetes Brother      I have reviewed and agree with the history as documented      Social History   Substance Use Topics    Smoking status: Former Smoker    Smokeless tobacco: Former User     Quit date: 12/26/2013    Alcohol use No        Review of Systems   Unable to perform ROS: Dementia       Physical Exam  ED Triage Vitals [12/17/17 1051]   Temperature Pulse Respirations Blood Pressure SpO2   98 °F (36 7 °C) 82 18 110/73 99 %      Temp Source Heart Rate Source Patient Position - Orthostatic VS BP Location FiO2 (%)   Oral Monitor Sitting Right arm --      Pain Score       6           Orthostatic Vital Signs  Vitals:    12/17/17 1051 12/17/17 1150 12/17/17 1345   BP: 110/73 104/56 105/62   Pulse: 82 83 85   Patient Position - Orthostatic VS: Sitting Sitting        Physical Exam   Constitutional: She appears well-developed and well-nourished  No distress  HENT:   Head: Normocephalic and atraumatic  Nose: Nose normal    Eyes: Conjunctivae and EOM are normal  Pupils are equal, round, and reactive to light  No scleral icterus  Neck: Normal range of motion  Neck supple  No JVD present  No tracheal deviation present  No thyromegaly present  Cardiovascular: Normal rate, regular rhythm, normal heart sounds and intact distal pulses  Exam reveals no gallop and no friction rub  Pulmonary/Chest: Effort normal and breath sounds normal  No respiratory distress  She has no wheezes  She has no rales  She exhibits no tenderness  Abdominal: Soft  Bowel sounds are normal  She exhibits no distension and no mass  There is no tenderness  There is no rebound and no guarding  No hernia  Musculoskeletal: Normal range of motion  She exhibits no edema, tenderness or deformity  Neurological: She is alert  She has normal reflexes  No cranial nerve deficit  Coordination normal    Skin: She is not diaphoretic  No erythema  Dependent ulcers, see HPI   Psychiatric: She has a normal mood and affect  Her behavior is normal    Nursing note and vitals reviewed        ED Medications  Medications   acetaminophen (TYLENOL) tablet 650 mg (650 mg Oral Given 12/17/17 1147)       Diagnostic Studies  Results Reviewed     None                 CT head without contrast   Final Result by Cathlean Primrose, DO (12/17 9356)      No acute intracranial abnormality  Microangiopathic changes  Workstation performed: [id number]               Procedures  Procedures      Phone Consults  ED Phone Contact    ED Course  ED Course            Identification of Seniors at [address] [facility] Most Recent Value   (ISAR) Identification of Seniors at Risk   Before the illness or injury that brought you to the Emergency, did you need someone to help you on a regular basis? 1 Filed at: 12/17/2017 1054   In the last 24 hours, have you needed more help than usual?  1 Filed at: 12/17/2017 1054   Have you been hospitalized for one or more nights during the past 6 months? 1 Filed at: 12/17/2017 1054   In general, do you see well? 1 Filed at: 12/17/2017 1054   In general, do you have serious problems with your memory? 1 Filed at: 12/17/2017 1054   Do you take more than three different medications every day? 1 Filed at: 12/17/2017 1054   ISAR Score  6 Filed at: 12/17/2017 1054                          MDM  Number of Diagnoses or Management Options  Injury of head, initial encounter: new and requires workup  Diagnosis management comments: Ct head negative as read by me       Amount and/or Complexity of Data Reviewed  Tests in the radiology section of CPT®: reviewed and ordered  Independent visualization of images, tracings, or specimens: yes      CritCare Time    Disposition  Final diagnoses:   Injury of head, initial encounter     Time reflects when diagnosis was documented in both MDM as applicable and the Disposition within this note     Time User Action Codes Description Comment    12/17/2017 12:04 PM Venancio Saravia Add [S09 90XA] Injury of head, initial encounter       ED Disposition     ED Disposition Condition Comment    Discharge  [facility] discharge to home/self care      Condition at discharge: Good Follow-up Information    None       Discharge Medication List as of 12/17/2017 12:47 PM      CONTINUE these medications which have NOT CHANGED    Details   acetaminophen (TYLENOL) 325 mg tablet Take 650 mg by mouth every 6 (six) hours as needed for mild pain, Historical Med      artificial tear (LUBRIFRESH P M ) 83-15 % ophthalmic ointment 4 (four) times a day as needed, Historical Med      bisacodyl (FLEET) 10 MG/30ML ENEM Insert 30 mL into the rectum as needed for constipation for up to 30 days, Starting Thu 8/31/2017, Until Sun 12/17/2017, Print      diclofenac sodium (VOLTAREN) 1 % Apply 4 g topically 2 (two) times a day for 30 days, Starting Thu 8/31/2017, Until Sun 12/17/2017, Print      digoxin (LANOXIN) 0 125 mg tablet Take 1 tablet by mouth daily for 30 days, Starting Thu 8/31/2017, Until Sun 12/17/2017, Print      ferrous sulfate 325 (65 Fe) mg tablet Take 1 tablet by mouth daily with breakfast for 30 days, Starting Thu 8/31/2017, Until Sat 9/30/2017, Print      insulin aspart (NovoLOG) 100 units/mL injection Inject 8 Units under the skin 3 (three) times a day before meals, Historical Med      insulin glargine (LANTUS) 100 units/mL subcutaneous injection Inject 5 Units under the skin daily at bedtime for 200 days, Starting Thu 8/31/2017, Until Mon 3/19/2018, Print      KETOCONAZOLE, TOPICAL, (NIZORAL A-D) 1 % SHAM Apply topically, Historical Med      levofloxacin (LEVAQUIN) 750 mg tablet Take 750 mg by mouth every 24 hours, Historical Med      loperamide (IMODIUM) 2 mg capsule Take 2 mg by mouth 4 (four) times a day as needed for diarrhea, Historical Med      methimazole (TAPAZOLE) 5 mg tablet Take 0 5 tablets by mouth 3 (three) times a week for 30 days, Starting Fri 9/1/2017, Until Sun 12/17/2017, Print      multivitamin (THERAGRAN) TABS Take 1 tablet by mouth daily for 30 days, Starting Thu 8/31/2017, Until Sun 12/17/2017, Print      mupirocin (BACTROBAN) 2 % ointment Apply topically 3 (three) times a day, Historical Med      ondansetron (ZOFRAN) 4 mg tablet Take 1 tablet by mouth every 8 (eight) hours as needed for nausea or vomiting for up to 30 days, Starting u 8/31/2017, Until Sun 12/17/2017, Print      rivaroxaban (XARELTO) 20 mg tablet Take 1 tablet by mouth daily for 30 days, Starting Thu 8/31/2017, Until Sun 12/17/2017, Print           No discharge procedures on file  ED Provider  Attending physically available and evaluated Rasheed Langston I managed the patient along with the ED Attending      Electronically Signed by         Kaushik Nye DO  Resident  12/18/17 0214

## 2017-12-18 ENCOUNTER — HOSPITAL ENCOUNTER (OUTPATIENT)
Dept: RADIOLOGY | Facility: HOSPITAL | Age: 77
Discharge: NURSING, REHAB OR LONG-TERM CARE | End: 2017-12-18
Attending: UROLOGY | Admitting: RADIOLOGY
Payer: MEDICARE

## 2017-12-18 ENCOUNTER — ANESTHESIA (OUTPATIENT)
Dept: SURGERY | Facility: HOSPITAL | Age: 77
End: 2017-12-18
Payer: MEDICARE

## 2017-12-18 ENCOUNTER — ANESTHESIA EVENT (OUTPATIENT)
Dept: SURGERY | Facility: HOSPITAL | Age: 77
End: 2017-12-18
Payer: MEDICARE

## 2017-12-18 VITALS
HEART RATE: 85 BPM | BODY MASS INDEX: 24.73 KG/M2 | TEMPERATURE: 98.4 F | SYSTOLIC BLOOD PRESSURE: 111 MMHG | RESPIRATION RATE: 16 BRPM | WEIGHT: 167 LBS | OXYGEN SATURATION: 98 % | HEIGHT: 69 IN | DIASTOLIC BLOOD PRESSURE: 63 MMHG

## 2017-12-18 DIAGNOSIS — R33.9 BLADDER RETENTION: ICD-10-CM

## 2017-12-18 PROCEDURE — 77002 NEEDLE LOCALIZATION BY XRAY: CPT

## 2017-12-18 PROCEDURE — 76942 ECHO GUIDE FOR BIOPSY: CPT

## 2017-12-18 PROCEDURE — 51102 DRAIN BL W/CATH INSERTION: CPT

## 2017-12-18 PROCEDURE — C1769 GUIDE WIRE: HCPCS

## 2017-12-18 RX ORDER — ONDANSETRON 2 MG/ML
INJECTION INTRAMUSCULAR; INTRAVENOUS AS NEEDED
Status: DISCONTINUED | OUTPATIENT
Start: 2017-12-18 | End: 2017-12-18 | Stop reason: SURG

## 2017-12-18 RX ORDER — PROPOFOL 10 MG/ML
INJECTION, EMULSION INTRAVENOUS CONTINUOUS PRN
Status: DISCONTINUED | OUTPATIENT
Start: 2017-12-18 | End: 2017-12-18 | Stop reason: SURG

## 2017-12-18 RX ORDER — FENTANYL CITRATE 50 UG/ML
INJECTION, SOLUTION INTRAMUSCULAR; INTRAVENOUS AS NEEDED
Status: DISCONTINUED | OUTPATIENT
Start: 2017-12-18 | End: 2017-12-18 | Stop reason: SURG

## 2017-12-18 RX ORDER — SODIUM CHLORIDE 9 MG/ML
75 INJECTION, SOLUTION INTRAVENOUS CONTINUOUS
Status: DISCONTINUED | OUTPATIENT
Start: 2017-12-18 | End: 2017-12-18 | Stop reason: HOSPADM

## 2017-12-18 RX ADMIN — FENTANYL CITRATE 50 MCG: 50 INJECTION, SOLUTION INTRAMUSCULAR; INTRAVENOUS at 11:18

## 2017-12-18 RX ADMIN — SODIUM CHLORIDE: 0.9 INJECTION, SOLUTION INTRAVENOUS at 11:05

## 2017-12-18 RX ADMIN — FENTANYL CITRATE 50 MCG: 50 INJECTION, SOLUTION INTRAMUSCULAR; INTRAVENOUS at 11:10

## 2017-12-18 RX ADMIN — ONDANSETRON 4 MG: 2 INJECTION INTRAMUSCULAR; INTRAVENOUS at 11:16

## 2017-12-18 RX ADMIN — PROPOFOL 50 MCG/KG/MIN: 10 INJECTION, EMULSION INTRAVENOUS at 11:16

## 2017-12-18 RX ADMIN — IOHEXOL 5 ML: 300 INJECTION, SOLUTION INTRAVENOUS at 11:53

## 2017-12-18 NOTE — ANESTHESIA POSTPROCEDURE EVALUATION
Post-Op Assessment Note      CV Status:  Stable    Mental Status:  Alert    Hydration Status:  Stable    PONV Controlled:  None    Airway Patency:  Patent    Post Op Vitals Reviewed: Yes          Staff: CRNA           BP   136/72   Temp      Pulse  78   Resp   26   SpO2   99

## 2017-12-18 NOTE — BRIEF OP NOTE (RAD/CATH)
IR SUPRAPUBIC TUBE  Procedure Note    PATIENT NAME: Artie Barbour  : 1940  MRN: [id number]     Pre-op Diagnosis:   1  Bladder retention      Post-op Diagnosis:   1  Bladder retention        Surgeon:   Sigrid Galo MD  Assistants:     No qualified resident was available  Estimated Blood Loss: None  Findings: Bladder distensible  16 Fr suprapubic (Agarwal) catheter placed  Specimens: None  Complications:  None  Anesthesia: Local and MAC      Sigrid Galo MD     Date: 2017  Time: 4:11 PM

## 2017-12-18 NOTE — SEDATION DOCUMENTATION
Suprapubic catheter placed in IR by Dr Glenis Pina with anesthesia without complication  Agarwal removed intact per order Dr Giancarlo Chau

## 2017-12-18 NOTE — DISCHARGE INSTRUCTIONS
Suprapubic Cystostomy     WHAT YOU NEED TO KNOW:   Suprapubic cystostomy is surgery to create a stoma (opening) through your abdomen into your bladder  This opening is where a catheter is inserted to drain urine  You may need a cystostomy if your urine flow is blocked  DISCHARGE INSTRUCTIONS:   Resume your normal diet  Small sips of flat soda will help with mild nausea  Follow up with your healthcare provider as directed: You may need to return to have your suprapubic catheter changed or removed  Write down your questions so you remember to ask them during your visits  Empty your urine drainage bag: Empty your urine drainage bag when it is ½ to ? full, or every 8 hours  If you have a smaller leg bag, empty it every 3 to 4 hours  Do the following when you empty your urine drainage bag:  · Hold the urine bag over a toilet or large container  · Remove the drain spout from its sleeve at the bottom of the urine bag  Do not touch the tip of the drain spout  Open the slide valve on the spout  · Let the urine flow out of the urine bag into the toilet or container  Do not let the drainage tube touch anything  · Clean the end of the drain spout with alcohol when the bag is empty  Ask which cleaning solution is best to use  · Close the slide valve and put the drain spout into its sleeve at the bottom of the urine bag  Write down how much urine was in your bag if you were asked to keep a record  Prevent an infection:   · Clean the stoma and skin around it daily  Wash your hands before and after cystostomy care  Put on a new pair of clean medical gloves  · Change your urine bag or clean reusable bags  Ask how often you need to change or clean your urine drainage bag  You may need to change your reusable bag at least once a week  · Keep the bag below your waist  This will prevent urine from flowing back into your bladder and causing an infection or other problems   Also, keep the tube free of kinks so the urine will drain properly  Do not pull on the catheter  This can cause pain and bleeding and may cause the catheter to come out  Contact Interventional Radiology at [phone] Carlos PATIENTS: Contact Interventional Radiology at [phone]) Jorje Grey PATIENTS: Contact Interventional Radiology at [phone]) if any of the following occur:  · You have a fever or chills  · Persistent nausea or vomiting  · You have severe pain  · You have a burning pain in your stoma  · Your stoma is red, swollen, or draining pus  · You have blood in your urine  · You have questions or concerns about your condition or care    Seek care immediately or call 911 if:   · No urine is draining into the urine bag

## 2018-01-11 NOTE — RESULT NOTES
Verified Results  *US BREAST LEFT LIMITED (DIAGNOSTIC) [id number] 01:57PM Rolanda De Jesus Order Number: [id number]    - Patient Instructions: To schedule this appointment, please contact Central Scheduling at [phone]  Test Name Result Flag Reference   US BREAST LEFT LIMITED (Report)     Patient History:   No known family history of cancer  Reason for exam: clinical finding  Mammo Diagnostic Left W CAD: January 24, 2017 - Check In #:    [de-identified]   CC and MLO view(s) were taken of the left breast      Technologist: SALAS Bonilla (R)(M)     Mammo Screening Right W CAD: January 24, 2017 - Check In #:    [de-identified]   CC and MLO view(s) were taken of the right breast      Technologist: SALAS Bonilla (SALAS)(M)   Diagnostic images of both breasts were obtained  Surface marker    over the LEFT upper posterior breast at the far posterior margin    of the image with no mammographic correlate  There is no evidence   for dominant mass, suspicious microcalcification or distortion  Targeted ultrasound of area of palpable concern in the LEFT    breast 12:00 region was performed with a high frequency linear    transducer  There is no sonographic evidence for cystic or solid    mass lesion or suspicious foci of acoustic attenuation  In the    area indicated by the patient only a rib is noted as a possible    correlate for the palpable finding  No evidence of malignancy demonstrated  US Breast Left Limited: January 24, 2017 - Check In #: [de-identified]   Technologist: RT Mario (R), RDMS     ASSESSMENT: BiRad:1 - Negative (Overall)   Left breast Left Diag Mammo: Left breast is BiRad:1 - negative  Left breast Left Brst US: Left breast is BiRad:1 - negative  Right breast Right Scrn Ramon: Right breast is BiRad:1 - negative       Recommendation:   Clinical management of the right breast    Routine screening mammogram    Analyzed by CAD Analyzed by CAD     Transcription Location: [facility] [place]   Signing Station: [id number]     Risk Value(s):   Tyrer-Cuzick 10 Year: 2 471%, Tyrer-Cuzick Lifetime: 2 471%,    Myriad Table: 1 5%, SARAH 5 Year: 1 7%, NCI Lifetime: 3 1%   Signed by:   Epi Teresa MD   1/24/17     MAMMO SCREENING RIGHT W CAD [id number] 01:20PM Jenny Rise     Test Name Result Flag Reference   MAMMO SCREENING RIGHT W CAD (Report)     Patient History:   No known family history of cancer  Reason for exam: clinical finding  Mammo Diagnostic Left W CAD: January 24, 2017 - Check In #:    [de-identified]   CC and MLO view(s) were taken of the left breast      Technologist: Rodolph Olszewski, R T (SALAS)(M)     Mammo Screening Right W CAD: January 24, 2017 - Check In #:    [de-identified]   CC and MLO view(s) were taken of the right breast      Technologist: Rodolph Olszewski, R T (R)(M)   Diagnostic images of both breasts were obtained  Surface marker    over the LEFT upper posterior breast at the far posterior margin    of the image with no mammographic correlate  There is no evidence   for dominant mass, suspicious microcalcification or distortion  Targeted ultrasound of area of palpable concern in the LEFT    breast 12:00 region was performed with a high frequency linear    transducer  There is no sonographic evidence for cystic or solid    mass lesion or suspicious foci of acoustic attenuation  In the    area indicated by the patient only a rib is noted as a possible    correlate for the palpable finding  No evidence of malignancy demonstrated  US Breast Left Limited: January 24, 2017 - Check In #: [de-identified]   Technologist: RT Filiberto (R), RDMS     ASSESSMENT: BiRad:1 - Negative (Overall)   Left breast Left Diag Mammo: Left breast is BiRad:1 - negative  Left breast Left Brst US: Left breast is BiRad:1 - negative  Right breast Right Scrn Ramon: Right breast is BiRad:1 - negative       Recommendation:   Clinical management of the right breast    Routine screening mammogram    Analyzed by CAD Analyzed by CAD     Transcription Location: SALAS [address] [facility]: [id number]     Risk Value(s):   Tyrer-Cuzick 10 Year: 2 471%, Tyrer-Cuzick Lifetime: 2 471%,    Myriad Table: 1 5%, SARAH 5 Year: 1 7%, NCI Lifetime: 3 1%   Signed by:   Fabio Ayoub MD   1/24/17     MAMMO DIAGNOSTIC LEFT W CAD [id number] 01:20PM Santa Dooley     Test Name Result Flag Reference   MAMMO DIAGNOSTIC LEFT W CAD (Report)     Patient History:   No known family history of cancer  Reason for exam: clinical finding  Mammo Diagnostic Left W CAD: January 24, 2017 - Check In #:    [de-identified]   CC and MLO view(s) were taken of the left breast      Technologist: SALAS Cat (R)(M)     Mammo Screening Right W CAD: January 24, 2017 - Check In #:    [de-identified]   CC and MLO view(s) were taken of the right breast      Technologist: SALAS Cat (R)(M)   Diagnostic images of both breasts were obtained  Surface marker    over the LEFT upper posterior breast at the far posterior margin    of the image with no mammographic correlate  There is no evidence   for dominant mass, suspicious microcalcification or distortion  Targeted ultrasound of area of palpable concern in the LEFT    breast 12:00 region was performed with a high frequency linear    transducer  There is no sonographic evidence for cystic or solid    mass lesion or suspicious foci of acoustic attenuation  In the    area indicated by the patient only a rib is noted as a possible    correlate for the palpable finding  No evidence of malignancy demonstrated  US Breast Left Limited: January 24, 2017 - Check In #: [de-identified]   Technologist: RT Juanita (R), RDMS     ASSESSMENT: BiRad:1 - Negative (Overall)   Left breast Left Diag Mammo: Left breast is BiRad:1 - negative  Left breast Left Brst US: Left breast is BiRad:1 - negative  Right breast Right Scrn Ramon: Right breast is BiRad:1 - negative       Recommendation:   Clinical management of the right breast    Routine screening mammogram    Analyzed by CAD Analyzed by CAD     Transcription Location: [address]   Signing Station: [id number]     Risk Value(s):   Tyrer-Cuzick 10 Year: 2 471%, Tyrer-Cuzick Lifetime: 2 471%,    Myriad Table: 1 5%, SARAH 5 Year: 1 7%, NCI Lifetime: 3 1%   Signed by:   Francis Rodriguez MD   1/24/17

## 2018-01-12 VITALS
DIASTOLIC BLOOD PRESSURE: 70 MMHG | HEART RATE: 100 BPM | TEMPERATURE: 96.5 F | SYSTOLIC BLOOD PRESSURE: 122 MMHG | OXYGEN SATURATION: 96 %

## 2018-01-13 VITALS — DIASTOLIC BLOOD PRESSURE: 80 MMHG | HEART RATE: 68 BPM | SYSTOLIC BLOOD PRESSURE: 120 MMHG

## 2018-01-15 VITALS
SYSTOLIC BLOOD PRESSURE: 98 MMHG | BODY MASS INDEX: 25.93 KG/M2 | OXYGEN SATURATION: 99 % | WEIGHT: 175.6 LBS | TEMPERATURE: 97.2 F | RESPIRATION RATE: 12 BRPM | DIASTOLIC BLOOD PRESSURE: 50 MMHG | HEART RATE: 85 BPM

## 2018-01-23 NOTE — MISCELLANEOUS
Message  Received a phone call from Moe Pradhan at [facility] [phone] requesting if patient could still have sp tube insertion with IR even though she is on ABX for wound absess on her bottom  Per jennifer Samuel to proceed  Informed Ashleigh Shafer of above  1        1 Amended By: Leonarda Urias; Dec 15 2017 9:03 AM EST    Active Problems   1  Anemia (285 9) (D64 9)  2  Breast nodule (793 89) (N63 0)  3  Breast pain, left (611 71) (N64 4)  4  Chronic constipation (564 00) (K59 09)  5  Encounter for screening mammogram for malignant neoplasm of breast (V76 12)   (Z12 31)  6  Iron deficiency anemia (280 9) (D50 9)  7  Urinary retention (788 20) (R33 9)  8  UTI (urinary tract infection) (599 0) (N39 0)    Current Meds  1  Acetaminophen 120 MG Rectal Suppository; INSERT 1 SUPPOSITORY RECTALLY   EVERY 4 TO 6 HOURS AS NEEDED; Therapy: 66DXK7245 to Recorded  2  Acetaminophen 325 MG Oral Tablet; TAKE 1 TO 2 TABLETS EVERY 4 HOURS AS   NEEDED; Therapy: 10YSE3030 to Recorded  3  Bisacodyl 10 MG Rectal Suppository; USE AS DIRECTED; Therapy: 19QQQ1191 to Recorded  4  Bisacodyl EC 5 MG Oral Tablet Delayed Release; TAKE 1 TABLET DAILY AS NEEDED; Therapy: 70APO6962 to Recorded  5  Digoxin 0 25 MG/ML Injection Solution; USE AS DIRECTED; Therapy: 93DWQ3151 to Recorded  6  MethIMAzole 10 MG Oral Tablet; TAKE 12 TABLET Daily; Therapy: 02VEF0996 to (Evaluate:73Msm4848) Recorded  7  Ondansetron 4 MG Oral Tablet Disintegrating; TAKE 1 TABLET Every 6 hours PRN   nausea; Therapy: 93QZE4640 to Recorded  8  Senna Plus 8 6-50 MG Oral Tablet; TAKE 1 OR 2 TABLETS DAILY TO PREVENT   CONSTIPATION; Therapy: 64DOO9997 to Recorded  9  Voltaren 1 % Transdermal Gel (Diclofenac Sodium); APPLY SPARINGLY TO AFFECTED   AREA(S) ONCE DAILY; Therapy: 57EMK8551 to Recorded  10  Xarelto 20 MG Oral Tablet; Take 1 tablet daily; Therapy: 17SIQ6207 to Recorded    Allergies   1   RisperDAL TABS    Signatures   Electronically signed by :  Hammad Dill, ; Dec 15 2017  9:03AM EST                       (Author)

## 2018-01-30 ENCOUNTER — PROCEDURE VISIT (OUTPATIENT)
Dept: UROLOGY | Facility: AMBULATORY SURGERY CENTER | Age: 78
End: 2018-01-30
Payer: MEDICARE

## 2018-01-30 DIAGNOSIS — R33.9 URINARY RETENTION: ICD-10-CM

## 2018-01-30 PROCEDURE — 51705 CHANGE OF BLADDER TUBE: CPT

## 2018-01-30 RX ORDER — CALCIUM POLYCARBOPHIL 625 MG 625 MG/1
625 TABLET ORAL DAILY
COMMUNITY

## 2018-01-30 RX ORDER — ACETAMINOPHEN 650 MG/1
1 SUPPOSITORY RECTAL EVERY 6 HOURS PRN
COMMUNITY
Start: 2017-01-31

## 2018-01-30 RX ORDER — DIMETHIC/ZINC OX/VITS A,D/ALOE 1 %-10 %
CREAM (GRAM) TOPICAL
COMMUNITY

## 2018-01-30 RX ORDER — BISACODYL 10 MG
10 SUPPOSITORY, RECTAL RECTAL DAILY
COMMUNITY

## 2018-01-30 NOTE — PROGRESS NOTES
1/30/2018    Rosario Mejia  1940  [id number]    Diagnosis  Urinary retention    Plan  FU in 6 weeks for next upsize 18 fr to 20 fr, nursing facility to call in the meantime with any concerns  Per Pt's request spoke with her daughter, Aida Perdomo, and updated her about this visit and plan  Procedure  16F spt removed after deflation of an intact balloon  Skin prepped with betadine, new 18F spt inserted via aseptic technique without difficulty  Patient tolerated good  15 ml balloon inflated with sterile water and irrigated easily for pink-tinged  return  Attached to large drainage bag  Pt presented on stretcher from skilled nursing facility  Unable to obtain ht/wt/vitals        Seferino Duncan RN

## 2018-02-27 ENCOUNTER — OFFICE VISIT (OUTPATIENT)
Dept: GASTROENTEROLOGY | Facility: MEDICAL CENTER | Age: 78
End: 2018-02-27
Payer: MEDICARE

## 2018-02-27 VITALS
HEART RATE: 90 BPM | DIASTOLIC BLOOD PRESSURE: 68 MMHG | WEIGHT: 167 LBS | HEIGHT: 69 IN | BODY MASS INDEX: 24.73 KG/M2 | SYSTOLIC BLOOD PRESSURE: 114 MMHG | TEMPERATURE: 98.5 F

## 2018-02-27 DIAGNOSIS — R19.7 DIARRHEA, UNSPECIFIED TYPE: ICD-10-CM

## 2018-02-27 DIAGNOSIS — R14.0 ABDOMINAL DISTENTION: Primary | ICD-10-CM

## 2018-02-27 PROCEDURE — 99214 OFFICE O/P EST MOD 30 MIN: CPT | Performed by: PHYSICIAN ASSISTANT

## 2018-02-27 NOTE — PROGRESS NOTES
Assessment/Plan:     Diagnoses and all orders for this visit:    Abdominal distention  -     XR abdomen 1 view kub; Future    Diarrhea, unspecified type        Patient has been having watery bowel movements as well as abdominal distention for was 2 months  She has a long history of constipation  I feel that this is most likely overflow constipation  Her nurse states she is mainly bed-bound and does not move around  She did have a prior KUB which showed an ileus  I asked them to hold her anti-diarrheal's and would repeat a KUB to see if this is the case  I told him I would call him with the results and then we can adjust her medication at that time  Be unlikely that this is infectious and has been going on for as long as had has however if the above is negative we can consider stool culture  She has also not had a colonoscopy in many years but has refused in the past     Subjective:      Patient ID: Janny Carpio is a 76 y o  female  HPI     Patient is here for diarrhea  She was seen approximately 1 year ago for anemia and constipation  Patient is very poor historian and is unable to give any history  I called see her broke and spoke with her nurse who states for the past few months she has been having watery bowel movements  She has been incontinent of stool and has had abdominal distention and bloating  The patient denies any pain  The states because of her incontinence she has developed an abscess and wound on her buttocks  She was recently placed on Bactrim for the abscess and then checked for C diff which was negative  Her last TSH was in November which was reportedly normal  Her last CBC was also around this time and hemoglobin was within normal limits  The nurse denies seeing any signs of blood in her bowel movements  She reportedly had a colonoscopy many years ago    It was recommend that she have this repeated in lieu of her anemia however she refused EGD and colonoscopy last year     Patient Active Problem List   Diagnosis    Diabetes mellitus ([facility] )    Pulmonary embolism ([place] [address] )    [facility]) [facility])    Atrial fibrillation ([facility])    Bipolar disorder ([place] [address] )    Urinary tract infection, acute    Late onset Alzheimer's disease with behavioral disturbance     Allergies   Allergen Reactions    Risperdal [Risperidone]      Current Outpatient Prescriptions on File Prior to Visit   Medication Sig    acetaminophen (TYLENOL) 120 mg suppository Insert 1 suppository into the rectum    acetaminophen (TYLENOL) 325 mg tablet Take 650 mg by mouth every 6 (six) hours as needed for mild pain    artificial tear (LUBRIFRESH P M ) 83-15 % ophthalmic ointment 4 (four) times a day as needed    bisacodyl (BISAC-EVAC) 10 mg suppository Insert 10 mg into the rectum daily    bisacodyl (DULCOLAX) 5 mg EC tablet Take 1 tablet by mouth daily as needed    calcium polycarbophil (FIBER-LAX) 625 mg tablet Take 625 mg by mouth daily    Colesevelam HCl (WELCHOL PO) Take by mouth    collagenase (SANTYL) ointment Apply topically daily    diclofenac sodium (VOLTAREN) 1 % Place on the skin daily    digoxin (LANOXIN) 0 125 mg tablet Take 1 tablet by mouth daily for 30 days    Dimethicone-Zinc Oxide-Vit A-D (A & D ZINC OXIDE) CREA Apply topically    ferrous sulfate 325 (65 Fe) mg tablet Take 1 tablet by mouth daily with breakfast for 30 days    insulin aspart (NovoLOG) 100 units/mL injection Inject 8 Units under the skin 3 (three) times a day before meals    insulin glargine (LANTUS) 100 units/mL subcutaneous injection Inject 5 Units under the skin daily at bedtime for 200 days (Patient taking differently: Inject 20 Units under the skin daily at bedtime  )    KETOCONAZOLE, TOPICAL, (NIZORAL A-D) 1 % SHAM Apply topically    levofloxacin (LEVAQUIN) 750 mg tablet Take 750 mg by mouth every 24 hours    loperamide (IMODIUM) 2 mg capsule Take 2 mg by mouth 4 (four) times a day as needed for diarrhea    magnesium hydroxide (MILK OF MAGNESIA) 400 mg/5 mL oral suspension Take by mouth daily as needed for constipation    methimazole (TAPAZOLE) 5 mg tablet Take 0 5 tablets by mouth 3 (three) times a week for 30 days (Patient taking differently: Take 2 5 mg by mouth every other day  )    miconazole (JOSE ANTIFUNGAL) 2 % cream Apply topically 2 (two) times a day    multivitamin (THERAGRAN) TABS Take 1 tablet by mouth daily for 30 days    mupirocin (BACTROBAN) 2 % ointment Apply topically 3 (three) times a day    ondansetron (ZOFRAN) 4 mg tablet Take 1 tablet by mouth every 8 (eight) hours as needed for nausea or vomiting for up to 30 days    OxyCODONE HCl 5 MG TABA Take by mouth    rivaroxaban (XARELTO) 20 mg tablet Take 1 tablet by mouth daily for 30 days     No current facility-administered medications on file prior to visit        Family History   Problem Relation Age of Onset    Coronary artery disease Father     Diabetes Sister     Diabetes Brother      Past Medical History:   Diagnosis Date    Acute thyroiditis     Arthritis     Ataxia     [facility]     [facility])     Bipolar disorder, unspecified     Coronary artery disease     Diabetes mellitus ([facility]     Difficulty in walking     [facility]) [facility])     Headache     Hyperlipidemia     Macular degeneration     Major depressive disorder     Muscle weakness     Psychiatric disorder     depression    Pulmonary embolism ([facility])     Weakness      Social History     Social History    Marital status: Single     Spouse name: N/A    Number of children: N/A    Years of education: N/A     Social History Main Topics    Smoking status: Former Smoker    Smokeless tobacco: Former User     Quit date: 12/26/2013    Alcohol use No    Drug use: No    Sexual activity: Not Currently     Other Topics Concern    None     Social History Narrative    None     Past Surgical History:   Procedure Laterality Date    EYE SURGERY      HYSTERECTOMY           Review of Systems   All other systems reviewed and are negative  Objective:      /68 (BP Location: Left arm, Patient Position: Sitting, Cuff Size: Adult)   Pulse 90   Temp 98 5 °F (36 9 °C) (Tympanic)   Ht 5' 9" (1 753 m)   Wt 75 8 kg (167 lb)   BMI 24 66 kg/m²          Physical Exam   Constitutional: She is oriented to person, place, and time  She appears well-developed and well-nourished  No distress  HENT:   Head: Atraumatic  Eyes: Conjunctivae and EOM are normal    Neck: Normal range of motion  Cardiovascular: Normal rate and regular rhythm  Pulmonary/Chest: Effort normal and breath sounds normal    Abdominal: Soft  Bowel sounds are normal  She exhibits distension  There is no tenderness  Tympanitic   Musculoskeletal: Normal range of motion  Neurological: She is alert and oriented to person, place, and time  Skin: Skin is warm and dry  Psychiatric: She has a normal mood and affect   Her behavior is normal

## 2018-02-27 NOTE — LETTER
February 27, 2018     Celio Castillo MD  [address]    Patient: Milana Bettencourt   YOB: 1940   Date of Visit: 2/27/2018       Dear Dr Sung Marino: Thank you for referring Milana Bettencourt to me for evaluation  Below are my notes for this consultation  If you have questions, please do not hesitate to call me  I look forward to following your patient along with you  Sincerely,        Prasanth Griffith PA-C        CC: No Recipients  Prasanth Santizotomasa [place]  2/27/2018 12:30 PM  Sign at close encounter  Assessment/Plan:     Diagnoses and all orders for this visit:    Abdominal distention  -     XR abdomen 1 view kub; Future    Diarrhea, unspecified type        Patient has been having watery bowel movements as well as abdominal distention for was 2 months  She has a long history of constipation  I feel that this is most likely overflow constipation  Her nurse states she is mainly bed-bound and does not move around  She did have a prior KUB which showed an ileus  I asked them to hold her anti-diarrheal's and would repeat a KUB to see if this is the case  I told him I would call him with the results and then we can adjust her medication at that time  Be unlikely that this is infectious and has been going on for as long as had has however if the above is negative we can consider stool culture  She has also not had a colonoscopy in many years but has refused in the past     Subjective:      Patient ID: Milana Bettencourt is a 76 y o  female  HPI     Patient is here for diarrhea  She was seen approximately 1 year ago for anemia and constipation  Patient is very poor historian and is unable to give any history  I called see her broke and spoke with her nurse who states for the past few months she has been having watery bowel movements  She has been incontinent of stool and has had abdominal distention and bloating  The patient denies any pain   The states because of her incontinence she has developed an abscess and wound on her buttocks  She was recently placed on Bactrim for the abscess and then checked for C diff which was negative  Her last TSH was in November which was reportedly normal  Her last CBC was also around this time and hemoglobin was within normal limits  The nurse denies seeing any signs of blood in her bowel movements  She reportedly had a colonoscopy many years ago  It was recommend that she have this repeated in lieu of her anemia however she refused EGD and colonoscopy last year      Patient Active Problem List   Diagnosis    Diabetes mellitus ([place] [address] )    Pulmonary embolism ([place] [address] )    [facility])    Atrial fibrillation ([facility])    Bipolar disorder ([place] [address] )    Urinary tract infection, acute    Late onset Alzheimer's disease with behavioral disturbance     Allergies   Allergen Reactions    Risperdal [Risperidone]      Current Outpatient Prescriptions on File Prior to Visit   Medication Sig    acetaminophen (TYLENOL) 120 mg suppository Insert 1 suppository into the rectum    acetaminophen (TYLENOL) 325 mg tablet Take 650 mg by mouth every 6 (six) hours as needed for mild pain    artificial tear (LUBRIFRESH P M ) 83-15 % ophthalmic ointment 4 (four) times a day as needed    bisacodyl (BISAC-EVAC) 10 mg suppository Insert 10 mg into the rectum daily    bisacodyl (DULCOLAX) 5 mg EC tablet Take 1 tablet by mouth daily as needed    calcium polycarbophil (FIBER-LAX) 625 mg tablet Take 625 mg by mouth daily    Colesevelam HCl (WELCHOL PO) Take by mouth    collagenase (SANTYL) ointment Apply topically daily    diclofenac sodium (VOLTAREN) 1 % Place on the skin daily    digoxin (LANOXIN) 0 125 mg tablet Take 1 tablet by mouth daily for 30 days    Dimethicone-Zinc Oxide-Vit A-D (A & D ZINC OXIDE) CREA Apply topically    ferrous sulfate 325 (65 Fe) mg tablet Take 1 tablet by mouth daily with breakfast for 30 days    insulin aspart (NovoLOG) 100 units/mL injection Inject 8 Units under the skin 3 (three) times a day before meals    insulin glargine (LANTUS) 100 units/mL subcutaneous injection Inject 5 Units under the skin daily at bedtime for 200 days (Patient taking differently: Inject 20 Units under the skin daily at bedtime  )    KETOCONAZOLE, TOPICAL, (NIZORAL A-D) 1 % SHAM Apply topically    levofloxacin (LEVAQUIN) 750 mg tablet Take 750 mg by mouth every 24 hours    loperamide (IMODIUM) 2 mg capsule Take 2 mg by mouth 4 (four) times a day as needed for diarrhea    magnesium hydroxide (MILK OF MAGNESIA) 400 mg/5 mL oral suspension Take by mouth daily as needed for constipation    methimazole (TAPAZOLE) 5 mg tablet Take 0 5 tablets by mouth 3 (three) times a week for 30 days (Patient taking differently: Take 2 5 mg by mouth every other day  )    miconazole (JOSE ANTIFUNGAL) 2 % cream Apply topically 2 (two) times a day    multivitamin (THERAGRAN) TABS Take 1 tablet by mouth daily for 30 days    mupirocin (BACTROBAN) 2 % ointment Apply topically 3 (three) times a day    ondansetron (ZOFRAN) 4 mg tablet Take 1 tablet by mouth every 8 (eight) hours as needed for nausea or vomiting for up to 30 days    OxyCODONE HCl 5 MG TABA Take by mouth    rivaroxaban (XARELTO) 20 mg tablet Take 1 tablet by mouth daily for 30 days     No current facility-administered medications on file prior to visit        Family History   Problem Relation Age of Onset    Coronary artery disease Father     Diabetes Sister     Diabetes Brother      Past Medical History:   Diagnosis Date    Acute thyroiditis     Arthritis     Ataxia     [facility]     [facility])     Bipolar disorder, unspecified     Coronary artery disease     Diabetes mellitus ([facility]     Difficulty in walking     DVT (deep venous thrombosis) ([facility])     Headache     Hyperlipidemia     Macular degeneration     Major depressive disorder     Muscle weakness  Psychiatric disorder     depression    Pulmonary embolism ([facility] [address] [address] [facility]     Weakness      Social History     Social History    Marital status: Single     Spouse name: N/A    Number of children: N/A    Years of education: N/A     Social History Main Topics    Smoking status: Former Smoker    Smokeless tobacco: Former User     Quit date: 12/26/2013    Alcohol use No    Drug use: No    Sexual activity: Not Currently     Other Topics Concern    None     Social History Narrative    None     Past Surgical History:   Procedure Laterality Date    EYE SURGERY      HYSTERECTOMY           Review of Systems   All other systems reviewed and are negative  Objective:      /68 (BP Location: Left arm, Patient Position: Sitting, Cuff Size: Adult)   Pulse 90   Temp 98 5 °F (36 9 °C) (Tympanic)   Ht 5' 9" (1 753 m)   Wt 75 8 kg (167 lb)   BMI 24 66 kg/m²           Physical Exam   Constitutional: She is oriented to person, place, and time  She appears well-developed and well-nourished  No distress  HENT:   Head: Atraumatic  Eyes: Conjunctivae and EOM are normal    Neck: Normal range of motion  Cardiovascular: Normal rate and regular rhythm  Pulmonary/Chest: Effort normal and breath sounds normal    Abdominal: Soft  Bowel sounds are normal  She exhibits distension  There is no tenderness  Tympanitic   Musculoskeletal: Normal range of motion  Neurological: She is alert and oriented to person, place, and time  Skin: Skin is warm and dry  Psychiatric: She has a normal mood and affect   Her behavior is normal

## 2018-03-13 ENCOUNTER — PROCEDURE VISIT (OUTPATIENT)
Dept: UROLOGY | Facility: AMBULATORY SURGERY CENTER | Age: 78
End: 2018-03-13
Payer: MEDICARE

## 2018-03-13 DIAGNOSIS — N31.9 NEUROGENIC BLADDER: Primary | ICD-10-CM

## 2018-03-13 PROCEDURE — 51705 CHANGE OF BLADDER TUBE: CPT | Performed by: UROLOGY

## 2018-03-13 NOTE — PROGRESS NOTES
3/13/2018    Brent Moy  1940  [id number]    Diagnosis  Chief Complaint     Urinary Retention; neurogenic bladder; chronic suprapubic catheter      Patient presents for routine suprapubic catheter upsize managed by Dr Sylvie Grimes  Follow up in 6 weeks for next upsize    Procedure       Cystostomy tube change     Date/Time 3/13/2018 1:46 PM     Performed by  Aida Brownlee     Authorized by Suzette Calvert              18F spt removed after deflation of an intact balloon  Skin prepped with betadine, new 20F spt inserted via aseptic technique without difficulty  Patient tolerated good  15 ml balloon inflated with sterile water and irrigated easily for clear  return  Attached to drainage bag  Patient presented on stretcher from skilled nursing facility, unable to obtain vitals        Casie Nagel RN

## 2018-03-20 ENCOUNTER — TELEPHONE (OUTPATIENT)
Dept: GASTROENTEROLOGY | Facility: MEDICAL CENTER | Age: 78
End: 2018-03-20

## 2018-03-20 NOTE — TELEPHONE ENCOUNTER
Dr Joaquín Blake pt    Dr Hannah Luong called asking to speak to Nora York regarding pt's last visit   Dr Hannah Luong can be reached at [phone]

## 2018-03-22 NOTE — TELEPHONE ENCOUNTER
He wanted to update us that she was doing well  I stated I never received the KUB & he would re-fax    Diamond Fernandes

## 2018-04-25 ENCOUNTER — PROCEDURE VISIT (OUTPATIENT)
Dept: UROLOGY | Facility: AMBULATORY SURGERY CENTER | Age: 78
End: 2018-04-25
Payer: MEDICARE

## 2018-04-25 DIAGNOSIS — Z93.59 CHRONIC SUPRAPUBIC CATHETER (HCC): ICD-10-CM

## 2018-04-25 DIAGNOSIS — N31.9 NEUROGENIC BLADDER: Primary | ICD-10-CM

## 2018-04-25 PROCEDURE — 51705 CHANGE OF BLADDER TUBE: CPT | Performed by: UROLOGY

## 2018-04-25 NOTE — PROGRESS NOTES
4/25/2018    Joseluis Alarcon  1940  [id number]    Diagnosis  Chief Complaint     Urinary Retention; neurogenic bladder; Chronic suprapubic catheter        Patient presents for routine spt upsize managed by Dr Augustin Lanza  Patient will follow up in 6 weeks for last upsize and will then have routine spt change in the [facility] nursing facility  Procedure       Cystostomy tube change     Date/Time 4/25/2018 1:26 PM     Performed by  Fern Adkins     Authorized by Nicole Myers              20F spt removed after deflation of an intact balloon  Skin prepped with betadine, new 22F spt inserted via aseptic technique without difficulty, urine return noted with placement of catheter  Patient tolerated good  15 ml balloon inflated with sterile water and irrigated easily for semi blood-tinged  return  Attached to large drainage bag and urine noted in tubing      Apolinar Mendez, VALERIA NASH,BSN

## 2018-05-30 ENCOUNTER — PROCEDURE VISIT (OUTPATIENT)
Dept: UROLOGY | Facility: AMBULATORY SURGERY CENTER | Age: 78
End: 2018-05-30
Payer: MEDICARE

## 2018-05-30 DIAGNOSIS — Z93.59 CHRONIC SUPRAPUBIC CATHETER (HCC): ICD-10-CM

## 2018-05-30 DIAGNOSIS — C64.9: ICD-10-CM

## 2018-05-30 DIAGNOSIS — R33.9 URINARY RETENTION: ICD-10-CM

## 2018-05-30 DIAGNOSIS — N31.9 NEUROGENIC BLADDER: Primary | ICD-10-CM

## 2018-05-30 PROCEDURE — 51705 CHANGE OF BLADDER TUBE: CPT | Performed by: UROLOGY

## 2018-05-30 NOTE — PROGRESS NOTES
5/30/2018    Alyce Dutta  1940  [id number]    Diagnosis  Chief Complaint     Urinary Retention; neurogenic bladder; chronic suprapubic catheter          Patient presents for routine spt change and upsize managed by Dr Roro Pollard  Follow up in December for annual cystoscopy spt change with Dr Roselyn Whitley  Orders written for routine spt changes at the nursing facility moving forward, 24 fr, 15 ml balloon  Procedure SPT upsize       Cystostomy tube change     Date/Time 5/30/2018 3:06 PM     Performed by  Lauri Altamirano     Authorized by Eulogio Machado              22F or 24F spt removed after deflation of an intact balloon  Skin prepped with betadine, new 24F spt inserted via aseptic technique without difficulty  Patient tolerated good  15 ml balloon inflated with sterile water and irrigated easily for pink-tinged  return  Attached to drainage bag  Patient presents on stretcher with aide from nursing facility  She denies complaints with catheter at this time  She is agreeable to changes being done by her nursing facility moving forward        VALERIA Broussard BSN

## 2018-12-31 PROBLEM — E05.90 HYPERTHYROIDISM: Status: ACTIVE | Noted: 2018-12-31

## 2019-03-28 ENCOUNTER — TELEPHONE (OUTPATIENT)
Dept: UROLOGY | Facility: CLINIC | Age: 79
End: 2019-03-28

## 2019-03-28 NOTE — TELEPHONE ENCOUNTER
[place] patient managed by Dr Anais Gonzalez  [address] today and spoke with Isrrael from scheduling to inform her that the patients annual cysto had been rescheduled for 5/14/19 @ 1pm at our [place] site  She verbalized understanding and confirmed change

## 2019-05-16 LAB — HBA1C MFR BLD HPLC: 8.5 %

## 2019-06-05 ENCOUNTER — PROCEDURE VISIT (OUTPATIENT)
Dept: UROLOGY | Facility: AMBULATORY SURGERY CENTER | Age: 79
End: 2019-06-05
Payer: MEDICARE

## 2019-06-05 VITALS — DIASTOLIC BLOOD PRESSURE: 72 MMHG | HEART RATE: 80 BPM | SYSTOLIC BLOOD PRESSURE: 108 MMHG

## 2019-06-05 DIAGNOSIS — N31.9 NEUROGENIC BLADDER: Primary | ICD-10-CM

## 2019-06-05 PROCEDURE — 52000 CYSTOURETHROSCOPY: CPT | Performed by: UROLOGY

## 2019-06-11 ENCOUNTER — NURSING HOME VISIT (OUTPATIENT)
Dept: GERIATRICS | Facility: OTHER | Age: 79
End: 2019-06-11
Payer: MEDICARE

## 2019-06-11 DIAGNOSIS — M11.269 CHONDROCALCINOSIS DUE TO DICALCIUM PHOSPHATE CRYSTALS, OF THE KNEE, UNSPECIFIED LATERALITY: ICD-10-CM

## 2019-06-11 DIAGNOSIS — F31.9 BIPOLAR AFFECTIVE DISORDER, REMISSION STATUS UNSPECIFIED (HCC): Chronic | ICD-10-CM

## 2019-06-11 DIAGNOSIS — Z79.4 TYPE 2 DIABETES MELLITUS WITHOUT COMPLICATION, WITH LONG-TERM CURRENT USE OF INSULIN (HCC): Chronic | ICD-10-CM

## 2019-06-11 DIAGNOSIS — F02.81 LATE ONSET ALZHEIMER'S DISEASE WITH BEHAVIORAL DISTURBANCE (HCC): Chronic | ICD-10-CM

## 2019-06-11 DIAGNOSIS — G30.1 LATE ONSET ALZHEIMER'S DISEASE WITH BEHAVIORAL DISTURBANCE (HCC): Chronic | ICD-10-CM

## 2019-06-11 DIAGNOSIS — E11.9 TYPE 2 DIABETES MELLITUS WITHOUT COMPLICATION, WITH LONG-TERM CURRENT USE OF INSULIN (HCC): Chronic | ICD-10-CM

## 2019-06-11 DIAGNOSIS — I48.91 ATRIAL FIBRILLATION, UNSPECIFIED TYPE (HCC): Primary | Chronic | ICD-10-CM

## 2019-06-11 DIAGNOSIS — E05.90 HYPERTHYROIDISM: ICD-10-CM

## 2019-06-11 PROBLEM — R53.81 DEBILITY: Status: ACTIVE | Noted: 2019-06-11

## 2019-06-11 PROCEDURE — 99309 SBSQ NF CARE MODERATE MDM 30: CPT | Performed by: INTERNAL MEDICINE

## 2019-08-07 ENCOUNTER — NURSING HOME VISIT (OUTPATIENT)
Dept: GERIATRICS | Facility: OTHER | Age: 79
End: 2019-08-07
Payer: MEDICARE

## 2019-08-07 DIAGNOSIS — M11.269 CHONDROCALCINOSIS DUE TO DICALCIUM PHOSPHATE CRYSTALS, OF THE KNEE, UNSPECIFIED LATERALITY: ICD-10-CM

## 2019-08-07 DIAGNOSIS — F02.81 LATE ONSET ALZHEIMER'S DISEASE WITH BEHAVIORAL DISTURBANCE (HCC): Chronic | ICD-10-CM

## 2019-08-07 DIAGNOSIS — Z79.4 TYPE 2 DIABETES MELLITUS WITHOUT COMPLICATION, WITH LONG-TERM CURRENT USE OF INSULIN (HCC): Chronic | ICD-10-CM

## 2019-08-07 DIAGNOSIS — F31.9 BIPOLAR AFFECTIVE DISORDER, REMISSION STATUS UNSPECIFIED (HCC): Chronic | ICD-10-CM

## 2019-08-07 DIAGNOSIS — N31.9 NEUROGENIC BLADDER: ICD-10-CM

## 2019-08-07 DIAGNOSIS — E05.90 HYPERTHYROIDISM: ICD-10-CM

## 2019-08-07 DIAGNOSIS — R53.81 DEBILITY: Primary | ICD-10-CM

## 2019-08-07 DIAGNOSIS — K59.09 OTHER CONSTIPATION: ICD-10-CM

## 2019-08-07 DIAGNOSIS — I48.91 ATRIAL FIBRILLATION, UNSPECIFIED TYPE (HCC): Chronic | ICD-10-CM

## 2019-08-07 DIAGNOSIS — G30.1 LATE ONSET ALZHEIMER'S DISEASE WITH BEHAVIORAL DISTURBANCE (HCC): Chronic | ICD-10-CM

## 2019-08-07 DIAGNOSIS — E11.9 TYPE 2 DIABETES MELLITUS WITHOUT COMPLICATION, WITH LONG-TERM CURRENT USE OF INSULIN (HCC): Chronic | ICD-10-CM

## 2019-08-07 PROCEDURE — 99309 SBSQ NF CARE MODERATE MDM 30: CPT | Performed by: NURSE PRACTITIONER

## 2019-08-07 NOTE — ASSESSMENT & PLAN NOTE
-hgb A1c 8 5% on 5/16/19; target is <8 5 % according to AGS guidelines ; ordered repeat level in 12/2019  -FBG since 6/20/19 range between 203-291  -Will increase lantus from 38 to 40 unit at HS and review BG logs in 2 weeks   -will increase FBG monitoring from weekly to 3x/week  -continue to monitor for any clinical decline   -STERLINGN

## 2019-08-07 NOTE — ASSESSMENT & PLAN NOTE
-denies associated s/sx   -continue medication management with methimazole 2 5 mg q other day   -continue to monitor and consult endocrinology as needed

## 2019-08-07 NOTE — ASSESSMENT & PLAN NOTE
-controlled with medication regimen including Bengay, Tylenol, oxycodone, colchicine and gabapentin  -continue to encourage activity as tolerated   -continue to monitor for any decline in clinical status and intervene as appropriate

## 2019-08-07 NOTE — ASSESSMENT & PLAN NOTE
-followed by urology   -no s/sx of infection   -continue suprapubic catheter and provide nursing care as per facility's practice

## 2019-08-07 NOTE — ASSESSMENT & PLAN NOTE
-stable; no change in mood or behavior noted from nursing  -weight stable; continue to monitor   -requiring 24/7 support at LTCF; needs complete assistance with all ADLs; able to self feed and have a comprehensible conversation; wheelchair/bed bound  -continue medication management with melatonin 3 mg and mirtazapine 15 mg at    -Psych consulting q 3 months (last visit  6/18/19)  -continue to provide conservative measures

## 2019-08-07 NOTE — PROGRESS NOTES
[facility]   [address]  [phone]) [address]  [address]      NAME: Johana Faria  AGE: [de-identified] y o   SEX: female    : 1940    Code Status: DNI/DNR/AND    DATE OF ENCOUNTER: 2019    Assessment and Plan     Problem List Items Addressed This Visit        Endocrine    Diabetes mellitus ([facility] [address] ) (Chronic)      -hgb A1c 8 5% on 19; target is <8 5 % according to AGS guidelines ; ordered repeat level in 2019  -FBG since 19 range between 203-291  -Will increase lantus from 38 to 40 unit at HS and review BG logs in 2 weeks   -will increase FBG monitoring from weekly to 3x/week  -continue to monitor for any clinical decline   -DWN         Hyperthyroidism     -denies associated s/sx   -continue medication management with methimazole 2 5 mg q other day   -continue to monitor and consult endocrinology as needed             Cardiovascular and Mediastinum    Atrial fibrillation (HCC) (Chronic)     -denies s/sx of SOB, chest pain, fatigue or palpitations   -continue medication management with xarelto 20 mg daily with food, and digoxin 0 125 mg daily   -continue to monitor             Nervous and Auditory    Late onset Alzheimer's disease with behavioral disturbance (Chronic)     -stable; no change in mood or behavior noted from nursing  -weight stable; continue to monitor   -requiring 24/7 support at LTCF; needs complete assistance with all ADLs; able to self feed and have a comprehensible conversation; wheelchair/bed bound  -continue medication management with melatonin 3 mg and mirtazapine 15 mg at HS   -Psych consulting q 3 months (last visit  19)  -continue to provide conservative measures             Musculoskeletal and Integument    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, of the knee     -controlled with medication regimen including Bengay, Tylenol, oxycodone, colchicine and gabapentin  -continue to encourage activity as tolerated   -continue to monitor for any decline in clinical status and intervene as appropriate               Other    Bipolar disorder ([facility] [address] ) (Chronic)     -no change in mood or behavior noted from nursing  -continue medication management with melatonin 3 mg and mirtazapine 15 mg at HS   -Psych consulting q 3 months (last visit  6/18/19)  -continue to provide conservative measures          Debility - Primary     -secondary to her chronic medical conditions   -requiring 24/7 support at LTCF         Other constipation     -denies s/sx on exam   -continue medication regimen including senna 2 tabs daily   -continue to monitor          Neurogenic bladder     -followed by urology   -no s/sx of infection   -continue suprapubic catheter and provide nursing care as per facility's practice                No orders of the defined types were placed in this encounter  Chief Complaint     Debility and follow-up of chronic medical conditions  History of Present Illness     [de-identified] yo female, resident of [facility], seen in collaboration with nursing to evaluate her debility secondary to her chronic medical conditions including but not limited to Bipolar disorder, atrial fibrillation, Chondrocalcinosis of the knee and Hyperthyroidism  No concerns from nursing  Upon assessment, resident resting comfortably in bed without any acute distress  She states that the addition of Bengay has been helping her pain management of her chronic knee pain  Denies chest pain, shortness of breath, decreased appetite, N/V/D or constipation  The following portions of the patient's history were reviewed and updated as appropriate: allergies, current medications, past family history, past medical history, past social history, past surgical history and problem list     Review of Systems     Review of Systems   Constitutional: Negative for activity change, appetite change, chills, diaphoresis and fatigue     HENT: Negative for dental problem (dentures fit well) and trouble swallowing  Eyes: Negative for pain  Respiratory: Negative for cough, choking, chest tightness and shortness of breath  Cardiovascular: Negative for chest pain, palpitations and leg swelling  Gastrointestinal: Negative for abdominal distention, abdominal pain, constipation, diarrhea and nausea  Musculoskeletal: Positive for arthralgias (B/L knee )  Skin: Negative  Neurological: Positive for headaches (ocassional )  Negative for dizziness, light-headedness and numbness  Psychiatric/Behavioral: Positive for sleep disturbance (ocassional )  The patient is not nervous/anxious  Active Problem List     Patient Active Problem List   Diagnosis    Diabetes mellitus ([place] [address] )    Pulmonary embolism ([place] [address] )    [facility])    Atrial fibrillation ([place] [address] )    Bipolar disorder ([place] [address] )    Late onset Alzheimer's disease with behavioral disturbance    Hyperthyroidism    Debility    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, of the knee    Other constipation    Neurogenic bladder       Objective     /70, HR 68, temp: 97 1, RR 20, 190 lbs (stable)    Physical Exam   Constitutional: She is oriented to person, place, and time  No distress  Appearing younger than stated age      Eyes: Conjunctivae are normal  Right eye exhibits no discharge  Left eye exhibits no discharge  L eye blindness (baseline)     Cardiovascular: Normal rate, normal heart sounds and intact distal pulses  No murmur heard  No edema; irregular rhythm      Pulmonary/Chest: Effort normal and breath sounds normal  No respiratory distress  She has no wheezes  She has no rales  Abdominal: Soft  Bowel sounds are normal  She exhibits no distension  There is no tenderness  There is no guarding  Rounded (baseline)   Genitourinary:   Genitourinary Comments: Suprapubic catheter CDI    Musculoskeletal: She exhibits no tenderness or deformity     + 1 edema of B/L knees; ROM of knees limited    Neurological: She is alert and oriented to person, place, and time  Skin: Skin is warm and dry  She is not diaphoretic  No erythema  No pallor  Heel protector boots in place      Psychiatric: She has a normal mood and affect  cooperative and pleasant during exam    Nursing note and vitals reviewed  Pertinent Laboratory/Diagnostic Studies:  Reviewed   BMP, digoxin 5/28/19  TSH, hgb A1c 8 5% 5/16/19  CBC 1/24/19    Consults  Ophthalmology 5/23/19  Urology 6/5/19  Podiatry 5/31/19  Dental 8/20/18  Psych 6/18/19     Current Medications       Medications reviewed and updated, see facility [facility] for details     PRN usage: oxycodone x 2 and Tylenol x 1 in the past 7 days    Maria L Brown  8/7/2019 3:13 PM

## 2019-08-07 NOTE — ASSESSMENT & PLAN NOTE
-denies s/sx of SOB, chest pain, fatigue or palpitations   -continue medication management with xarelto 20 mg daily with food, and digoxin 0 125 mg daily   -continue to monitor

## 2019-08-07 NOTE — ASSESSMENT & PLAN NOTE
-no change in mood or behavior noted from nursing  -continue medication management with melatonin 3 mg and mirtazapine 15 mg at HS   -Psych consulting q 3 months (last visit  6/18/19)  -continue to provide conservative measures

## 2019-08-07 NOTE — ASSESSMENT & PLAN NOTE
-denies s/sx on exam   -continue medication regimen including senna 2 tabs daily   -continue to monitor

## 2019-08-21 ENCOUNTER — NURSING HOME VISIT (OUTPATIENT)
Dept: GERIATRICS | Facility: OTHER | Age: 79
End: 2019-08-21
Payer: MEDICARE

## 2019-08-21 DIAGNOSIS — M54.50 ACUTE MIDLINE LOW BACK PAIN WITHOUT SCIATICA: Primary | ICD-10-CM

## 2019-08-21 DIAGNOSIS — G30.1 LATE ONSET ALZHEIMER'S DISEASE WITH BEHAVIORAL DISTURBANCE (HCC): Chronic | ICD-10-CM

## 2019-08-21 DIAGNOSIS — R53.81 DEBILITY: ICD-10-CM

## 2019-08-21 DIAGNOSIS — F02.81 LATE ONSET ALZHEIMER'S DISEASE WITH BEHAVIORAL DISTURBANCE (HCC): Chronic | ICD-10-CM

## 2019-08-21 DIAGNOSIS — G47.09 OTHER INSOMNIA: ICD-10-CM

## 2019-08-21 DIAGNOSIS — E11.9 TYPE 2 DIABETES MELLITUS WITHOUT COMPLICATION, WITH LONG-TERM CURRENT USE OF INSULIN (HCC): Chronic | ICD-10-CM

## 2019-08-21 DIAGNOSIS — Z79.4 TYPE 2 DIABETES MELLITUS WITHOUT COMPLICATION, WITH LONG-TERM CURRENT USE OF INSULIN (HCC): Chronic | ICD-10-CM

## 2019-08-21 PROCEDURE — 99309 SBSQ NF CARE MODERATE MDM 30: CPT | Performed by: STUDENT IN AN ORGANIZED HEALTH CARE EDUCATION/TRAINING PROGRAM

## 2019-08-21 NOTE — ASSESSMENT & PLAN NOTE
Continue melatonin 3 mg p o   Daily  Will review patient after current intervention for acute back pain to determine whether sleep has improved due to better pain control  If not will increase melatonin to 6 mg daily thereafter  Counseled patient and discussed with her the importance of avoiding daytime naps and avoiding any caffeinated beverages after 14:00

## 2019-08-21 NOTE — ASSESSMENT & PLAN NOTE
Recommend reorientation and redirection as needed  Patient encouraged to participate in social activities at nursing home  Maintain [place] precautions  Continue nutritional support/supportive care

## 2019-08-21 NOTE — ASSESSMENT & PLAN NOTE
Discontinue p r n  Tylenol/arthritis pain ER  Start Tylenol 975 mg p o  Q 8h  Start lidocaine patch 4% to lower back daily  Referral to physical therapy to evaluate and treat  X-ray lumbosacral area to rule out any compression fracture  Discussed in detail with patient, the importance of avoiding narcotic use for acute chronic back pain as well as given her prior history of ileus    Patient verbalized understanding

## 2019-08-21 NOTE — ASSESSMENT & PLAN NOTE
Secondary to patient's chronic comorbidities and recent low back pain  Patient continues to require 24/7 care at this long-term care facility for assistance with ADLs and supervision    Maintain [place] precautions  Continue supportive care, nutritional support

## 2019-08-21 NOTE — PROGRESS NOTES
[address]  [phone]  [facility] Progress Note  Billing code:        NAME: Clemencia Weeks  AGE: [de-identified] y o  SEX: female    DATE OF ENCOUNTER: 8/21/2019    Assessment and Plan     Problem List Items Addressed This Visit        Endocrine    Diabetes mellitus ([address] ) (Chronic)       Nervous and Auditory    Late onset Alzheimer's disease with behavioral disturbance (Chronic)     Recommend reorientation and redirection as needed  Patient encouraged to participate in social activities at nursing home  Maintain [place] precautions  Continue nutritional support/supportive care            Other    Debility     Secondary to patient's chronic comorbidities and recent low back pain  Patient continues to require 24/7 care at this long-term care facility for assistance with ADLs and supervision  Maintain [place] precautions  Continue supportive care, nutritional support         Acute midline low back pain - Primary     Discontinue p r n  Tylenol/arthritis pain ER  Start Tylenol 975 mg p o  Q 8h  Start lidocaine patch 4% to lower back daily  Referral to physical therapy to evaluate and treat  X-ray lumbosacral area to rule out any compression fracture  Discussed in detail with patient, the importance of avoiding narcotic use for acute chronic back pain as well as given her prior history of ileus  Patient verbalized understanding         Other insomnia     Continue melatonin 3 mg p o   Daily  Will review patient after current intervention for acute back pain to determine whether sleep has improved due to better pain control  If not will increase melatonin to 6 mg daily thereafter  Counseled patient and discussed with her the importance of avoiding daytime naps and avoiding any caffeinated beverages after 14:00               - Counseling Documentation: patient was counseled regarding: instructions for management, risk factor reductions, impressions, risks and benefits of treatment options and importance of compliance with treatment    Chief Complaint     'Please help me, my back is aching'    History of Present Illness     HPI  This is an 70-year-old female with a past medical history of bipolar disorder, neurogenic bladder, late onset Alzheimer's disease with behavioral disturbance, atrial fibrillation, chondrocalcinosis, diabetes, previous pulmonary embolism and hyperthyroidism amongst multiple other medical conditions who was seen and assessed at bedside for new onset lower back pain  Per nursing staff, patient has complained of generalized body pain and chronic joint pain in the past   She currently is on oxycodone 2 5 mg p o  Q 8 H however has been requesting from staff that her oxycodone dosage be increased  Patient does have a history of ileus as well as chronic constipation for which narcotics was recommended to be avoided as result of which she is on a very low dose only  Patient denies any recent falls and states that her back pain is constant, located in the central lower back, graded 8/10 in intensity at worse, relieved minimally by oxycodone and is worse with movement  She denies any numbness, tingling, new weakness or symptoms of cauda equina  The patient does have a history of a neurogenic bladder with a suprapubic catheter in situ and she has had no change in bowel movements  The patient is wheelchair-bound and per nursing staff, usually prefers sitting in bed all day as result of which this may have contributed to her debility and back pain  Of note, the patient's fasting blood sugars have been in the high 200s while on Lantus 40 units subcutaneous daily  Patient has also not been compliant with diet and admits that she has been drinking soda very often  She also states that she has not been sleeping well at nighttime which has been chronic  She does not have difficulty falling asleep however she is not able to stay asleep throughout the night      The following portions of the patient's history were reviewed and updated as appropriate: allergies, current medications, past family history, past medical history, past social history, past surgical history and problem list     Review of Systems     Review of Systems   Constitutional: Positive for activity change (2/2 LBP)  Negative for appetite change, chills and fever  HENT: Negative for congestion, ear pain, hearing loss, rhinorrhea, sore throat, trouble swallowing and voice change  Eyes: Negative for discharge  Respiratory: Negative for cough, chest tightness, shortness of breath, wheezing and stridor  Cardiovascular: Negative for chest pain, palpitations and leg swelling  Gastrointestinal: Positive for constipation (Chronic)  Negative for abdominal pain, diarrhea, nausea and vomiting  Genitourinary: Negative for dysuria  Musculoskeletal: Positive for arthralgias (Chronic knee pain), back pain (Midline, acute), gait problem (Patient is wheelchair-bound) and joint swelling (Chronic)  Skin: Negative for color change, pallor, rash and wound  Neurological: Negative for dizziness, tremors, seizures, syncope, speech difficulty, light-headedness, numbness and headaches  Psychiatric/Behavioral: Negative for confusion and hallucinations  Insomnia       Active Problem List     Patient Active Problem List   Diagnosis    [facility] ([email or web address] )    Pulmonary embolism ([facility] )    [facility]) ([facility])    Atrial fibrillation ([facility])    Bipolar disorder ([facility] )    Late onset Alzheimer's disease with behavioral disturbance    Hyperthyroidism    Debility    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, of the knee    Other constipation    Neurogenic bladder    Acute midline low back pain    Other insomnia       Objective   /76, temp 97 1°, HR 68, RR 20, O2 sats 94% BS 272mg/dl    Physical Exam   Constitutional: She is oriented to person, place, and time   She appears well-developed and well-nourished  No distress  HENT:   Head: Normocephalic and atraumatic  Nose: Nose normal    Mouth/Throat: No oropharyngeal exudate  Eyes: Conjunctivae are normal  Right eye exhibits no discharge  Left eye exhibits no discharge  No scleral icterus  Neck: Normal range of motion  Neck supple  Cardiovascular: Normal rate, regular rhythm, normal heart sounds and intact distal pulses  Exam reveals no gallop and no friction rub  No murmur heard  Pulmonary/Chest: Effort normal  No stridor  No respiratory distress  She has no wheezes  She has no rales  Air entry fair bilaterally,  Harsh breath sounds   Abdominal: Soft  Bowel sounds are normal  She exhibits distension (Chronic and at patient's baseline)  She exhibits no mass  There is no tenderness  There is no rebound and no guarding  Musculoskeletal: She exhibits edema (Chronic, nonpitting, for a lower extremities)  She exhibits no tenderness or deformity  Decreased ROM at back secondary to pain  Sensation intact, pulses palpable   Neurological: She is alert and oriented to person, place, and time  She has normal reflexes  No cranial nerve deficit  Skin: Skin is warm  No rash noted  She is not diaphoretic  No erythema  No pallor  Psychiatric: She has a normal mood and affect  Nursing note and vitals reviewed  Pertinent Laboratory/Diagnostic Studies:  Reviewed recent blood work  Ordered lumbosacral x-ray to rule out compression fracture    Current Medications   Medications were reviewed and updated in facility paper chart      Ivania Ayala MD  Geriatric Medicine  8/21/2019 2:26 PM

## 2019-09-02 ENCOUNTER — TELEPHONE (OUTPATIENT)
Dept: OTHER | Facility: OTHER | Age: 79
End: 2019-09-02

## 2019-09-03 ENCOUNTER — NURSING HOME VISIT (OUTPATIENT)
Dept: GERIATRICS | Facility: OTHER | Age: 79
End: 2019-09-03
Payer: MEDICARE

## 2019-09-03 DIAGNOSIS — K56.7 ILEUS (HCC): Primary | ICD-10-CM

## 2019-09-03 PROCEDURE — 99307 SBSQ NF CARE SF MDM 10: CPT | Performed by: NURSE PRACTITIONER

## 2019-09-03 NOTE — PROGRESS NOTES
[facility]  [address]  [phone]) [address] [place] [address]  Code  32    NAME: Minerva Issa  AGE: [de-identified] y o  SEX: female    : 1940    Code Status: AND/DNR/DNI/DNH    DATE OF ENCOUNTER: 9/3/2019    Assessment and Plan     Problem List Items Addressed This Visit        Digestive    Ileus ([address] ) - Primary     -xray of abdomen depicted moderate colonic ileus and large amount of retained fecal material in colon (19)  -resident has a hx of ileus in the past per GI note 2 years ago   -resident at risk secondary to narcotics use for chronic pain and DM II   -change from CCD diet to liquid diet and evaluate when appropriate to advance diet based on s/sx and bowel habits  -VS BID x 3 days   -zofran PRN to manage s/sx of nausea    -monitor for any s/sx of bowel obstruction   -continue to monitor for improvement and or worsening in condition and intervene as appropriate   -DWN and resident                No orders of the defined types were placed in this encounter  Chief Complaint      Following up on abdominal xray and nausea  History of Present Illness     [de-identified] yo female, resident of [address], seen in collaboration with nursing to evaluate intermittent nausea and recent abdominal xray results demonstrating a moderate ileus  Of note, resident had a prior ileus without complications and resolved with no surgical interventions  No concerns from nursing  Upon exam, resident is resting comfortably in bed without any s/sx of discomfort or distress  When assessing her abdomen, she states, "my stomach is always this big " She denies pain, decreased appetite or constipation  She states that she occasionally feels nauseated         The following portions of the patient's history were reviewed and updated as appropriate: allergies, current medications, past family history, past medical history, past social history, past surgical history and problem list     Review of Systems     ROS limited secondary to Dementia  Review of Systems   Constitutional: Negative for activity change, appetite change, chills and fatigue  Gastrointestinal: Positive for abdominal distention and nausea (occasionally )  Negative for abdominal pain, blood in stool, constipation, diarrhea and vomiting  Active Problem List     Patient Active Problem List   Diagnosis    Diabetes mellitus ([facility] )    Pulmonary embolism ([facility] )    [facility]) [facility]    Atrial fibrillation ([facility] )    Bipolar disorder ([place] [address] )    Late onset Alzheimer's disease with behavioral disturbance    Hyperthyroidism    Debility    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, of the knee    Other constipation    Neurogenic bladder    Acute midline low back pain    Other insomnia    Ileus ([facility] )       Objective        /74, HR 94,  Temp:97 6, RR 20, 94% RA      Physical Exam   Constitutional: No distress  Appearing stated age    Abdominal: Soft  Bowel sounds are normal  She exhibits distension  She exhibits no mass  There is tenderness (midly tender throughout all 4 quadrants )  There is no rebound and no guarding  Neurological: She is alert  Skin: Skin is warm and dry  No rash noted  She is not diaphoretic  No erythema  No pallor  Psychiatric:   Pleasant and cooperative during exam    Nursing note and vitals reviewed  Pertinent Laboratory/Diagnostic Studies:  Reviewed     Current Medications       Medications reviewed and updated, see facility [facility] for details         MARIANA Coleman   [facility]  9/3/2019 4:36 PM

## 2019-09-03 NOTE — ASSESSMENT & PLAN NOTE
-xray of abdomen depicted moderate colonic ileus and large amount of retained fecal material in colon (9/1/19)  -resident has a hx of ileus in the past per GI note 2 years ago   -resident at risk secondary to narcotics use for chronic pain and DM II   -change from CCD diet to liquid diet and evaluate when appropriate to advance diet based on s/sx and bowel habits  -VS BID x 3 days   -zofran PRN to manage s/sx of nausea    -monitor for any s/sx of bowel obstruction   -continue to monitor for improvement and or worsening in condition and intervene as appropriate   -DWN and resident

## 2019-09-06 ENCOUNTER — NURSING HOME VISIT (OUTPATIENT)
Dept: GERIATRICS | Facility: OTHER | Age: 79
End: 2019-09-06
Payer: MEDICARE

## 2019-09-06 DIAGNOSIS — K56.7 ILEUS (HCC): Primary | ICD-10-CM

## 2019-09-06 PROCEDURE — 99307 SBSQ NF CARE SF MDM 10: CPT | Performed by: NURSE PRACTITIONER

## 2019-09-06 NOTE — ASSESSMENT & PLAN NOTE
-improved in comparison from abdominal xray on 9/1 to imaging performed on 9/5     -9/5 report demonstrated dilated loops of colon  No gross masses or abnormal calcifications  Colonic fecal residual is noted  Soft tissues appear without significant abnormality  Findings have improved from the previous study     -no reports of nausea or vomiting in the past 72 hours   -moving bowels regularly   -will advance diet from liquids to previous CCD diet  -continue to monitor for any change in condition   -DWN and resident

## 2019-09-06 NOTE — PROGRESS NOTES
[facility]  [address]  [phone]) [address] [place] [address]  Code 32    NAME: Ling Beebe  AGE: [de-identified] y o  SEX: female    : 1940    Code Status: AND/DNR/DNI/DNH    DATE OF ENCOUNTER: 2019    Assessment and Plan     Problem List Items Addressed This Visit        Digestive    Ileus ([address] ) - Primary     -improved in comparison from abdominal xray on  to imaging performed on      - report demonstrated dilated loops of colon  No gross masses or abnormal calcifications  Colonic fecal residual is noted  Soft tissues appear without significant abnormality  Findings have improved from the previous study  -no reports of nausea or vomiting in the past 72 hours   -moving bowels regularly   -will advance diet from liquids to previous CCD diet  -continue to monitor for any change in condition   -DWN and resident                 No orders of the defined types were placed in this encounter  Chief Complaint     Follow up on ileus and s/sx     History of Present Illness     [de-identified] yo female, resident of [facility], seen in collaboration with nursing to follow up on repeat abdominal xray performed on 19 as well as symptoms that prompted the initial w/u last weekend  No concerns from nursing  Upon exam, resident states, "I am ready to start solids!" She rests comfortably in her bed without any s/sx of discomfort or distress  She reports that she is moving her bowels, and denies nausea, decreased appetite, abdominal pain or vomiting  The following portions of the patient's history were reviewed and updated as appropriate: allergies, current medications, past medical history, and problem list     Review of Systems     Review of Systems   Constitutional: Negative for appetite change  Gastrointestinal: Negative for abdominal distention, abdominal pain, constipation, diarrhea, nausea and vomiting         Active Problem List Patient Active Problem List   Diagnosis    [facility] )    Pulmonary embolism ([facility] )    [facility]) ([facility])    Atrial fibrillation ([facility] )    Bipolar disorder ([facility] )    Late onset Alzheimer's disease with behavioral disturbance    Hyperthyroidism    Debility    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, of the knee    Other constipation    Neurogenic bladder    Acute midline low back pain    Other insomnia    Ileus ([facility])       Objective     /82, HR 98, RR 20, temp: 97 3    Physical Exam   Constitutional: No distress  Abdominal: Soft  Bowel sounds are normal  She exhibits distension (baseline )  She exhibits no mass  There is tenderness (mild; baseline)  There is no guarding  Skin: She is not diaphoretic  Nursing note and vitals reviewed  Pertinent Laboratory/Diagnostic Studies:  Reviewed abdominal xray report in facility's paper chart     Current Medications       Medications reviewed and updated, see facility [facility] for details         MARIANA Botello   [facility]  9/6/2019 1:41 PM

## 2019-09-23 ENCOUNTER — TELEPHONE (OUTPATIENT)
Dept: OTHER | Facility: OTHER | Age: 79
End: 2019-09-23

## 2019-09-23 NOTE — TELEPHONE ENCOUNTER
Sent this Message to Dr Tenzin Carroll via [facility] connects @ [phone]    [phone] station 3/ Dorcas from [facility] arianne/ Fermin Reynolds DOB [birth year]/ Episode of emesis     Told Dorcas to call back in 20-30 minutes is no call back from Dr Tenzin Carroll

## 2019-09-24 ENCOUNTER — NURSING HOME VISIT (OUTPATIENT)
Dept: GERIATRICS | Facility: OTHER | Age: 79
End: 2019-09-24
Payer: MEDICARE

## 2019-09-24 DIAGNOSIS — R11.0 NAUSEA: Primary | ICD-10-CM

## 2019-09-24 PROCEDURE — 99308 SBSQ NF CARE LOW MDM 20: CPT | Performed by: NURSE PRACTITIONER

## 2019-09-24 NOTE — ASSESSMENT & PLAN NOTE
-most likely multifactorial including recent ileus, intermittent constipation, chronic opioid use, and baseline distended abdomen   -not accompanied by constipation, decreased appetite, diarrhea, vomiting or any other associated s/sx   -resident is [facility] and therefore will focus on more symptom management   -EKG ordered to check QTC prolongation (last EKG in 2017); if WNL will start Zofran PRN to help symptom control   -continue to monitor   -DWN

## 2019-09-24 NOTE — PROGRESS NOTES
[facility]  [address]  [phone]) [address] [address]  Code 32      NAME: Alyce Dutta  AGE: [de-identified] y o  SEX: female    : 1940    Code Status: DNR/DNI/DNH    DATE OF ENCOUNTER: 2019    Assessment and Plan     Problem List Items Addressed This Visit        Other    Nausea - Primary     -most likely multifactorial including recent ileus, intermittent constipation, chronic opioid use, and baseline distended abdomen   -not accompanied by constipation, decreased appetite, diarrhea, vomiting or any other associated s/sx   -resident is [facility] and therefore will focus on more symptom management   -EKG ordered to check QTC prolongation (last EKG in 2017); if WNL will start Zofran PRN to help symptom control   -continue to monitor   -DWN                  No orders of the defined types were placed in this encounter  Chief Complaint     Nausea     History of Present Illness     [de-identified] yo female, resident of [address] [facility], seen in collaboration with nursing to evaluate intermittent complaints of nausea  Of note, resident has a history of an ileus and treated with conservative interventions including bowel rest and symptom control since resident is [facility]  No other complaints from nursing  Upon exam, resident resting comfortably in bed without any s/sx of discomfort or distress  She reports no nausea today, but does complain of this symptom occasionally  She states that the Zofran has helped in the past to relieve her nausea   Discussed with resident about adding this medication PRN to her regimen for symptom management as along as her EKG is normal        The following portions of the patient's history were reviewed and updated as appropriate: allergies, current medications, past medical history and problem list     Review of Systems     Review of Systems   Constitutional: Negative for activity change, appetite change, chills, fatigue and fever    Respiratory: Negative for shortness of breath  Gastrointestinal: Positive for abdominal distention (baseline)  Negative for abdominal pain, constipation, diarrhea, nausea and vomiting  Active Problem List     Patient Active Problem List   Diagnosis    Diabetes mellitus ([place] [address] )    Pulmonary embolism ([place] [address] )    [facility])    Atrial fibrillation ([facility] )    Bipolar disorder ([place] [address] )    Late onset Alzheimer's disease with behavioral disturbance    Hyperthyroidism    Debility    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, of the knee    Other constipation    Neurogenic bladder    Acute midline low back pain    Other insomnia    Ileus ([place] [address] )    Nausea       Objective       Physical Exam   Constitutional: No distress  Appearing chronically ill    Abdominal: Soft  Bowel sounds are normal  She exhibits distension  She exhibits no mass  There is tenderness (mildly;baseline)  There is no guarding  Neurological: She is alert  Skin: Skin is warm and dry  She is not diaphoretic  No erythema  No pallor  Psychiatric:   Pleasant and cooperative      Nursing note and vitals reviewed  Pertinent Laboratory/Diagnostic Studies:  N/A    Current Medications       Medications reviewed and updated, see facility [facility] for details         MARIANA Solorio   [facility]  9/24/2019 4:20 PM

## 2019-09-26 ENCOUNTER — NURSING HOME VISIT (OUTPATIENT)
Dept: GERIATRICS | Facility: OTHER | Age: 79
End: 2019-09-26
Payer: MEDICARE

## 2019-09-26 DIAGNOSIS — E05.90 HYPERTHYROIDISM: ICD-10-CM

## 2019-09-26 DIAGNOSIS — K56.7 ILEUS (HCC): ICD-10-CM

## 2019-09-26 DIAGNOSIS — I48.20 CHRONIC ATRIAL FIBRILLATION (HCC): Chronic | ICD-10-CM

## 2019-09-26 DIAGNOSIS — F02.81 LATE ONSET ALZHEIMER'S DISEASE WITH BEHAVIORAL DISTURBANCE (HCC): Chronic | ICD-10-CM

## 2019-09-26 DIAGNOSIS — F31.9 BIPOLAR AFFECTIVE DISORDER, REMISSION STATUS UNSPECIFIED (HCC): Chronic | ICD-10-CM

## 2019-09-26 DIAGNOSIS — Z79.4 TYPE 2 DIABETES MELLITUS WITHOUT COMPLICATION, WITH LONG-TERM CURRENT USE OF INSULIN (HCC): Chronic | ICD-10-CM

## 2019-09-26 DIAGNOSIS — G47.09 OTHER INSOMNIA: ICD-10-CM

## 2019-09-26 DIAGNOSIS — K59.09 OTHER CONSTIPATION: ICD-10-CM

## 2019-09-26 DIAGNOSIS — G30.1 LATE ONSET ALZHEIMER'S DISEASE WITH BEHAVIORAL DISTURBANCE (HCC): Chronic | ICD-10-CM

## 2019-09-26 DIAGNOSIS — R53.81 DEBILITY: Primary | ICD-10-CM

## 2019-09-26 DIAGNOSIS — E11.9 TYPE 2 DIABETES MELLITUS WITHOUT COMPLICATION, WITH LONG-TERM CURRENT USE OF INSULIN (HCC): Chronic | ICD-10-CM

## 2019-09-26 PROCEDURE — 99309 SBSQ NF CARE MODERATE MDM 30: CPT | Performed by: STUDENT IN AN ORGANIZED HEALTH CARE EDUCATION/TRAINING PROGRAM

## 2019-09-26 NOTE — PROGRESS NOTES
[address]  [phone]  [facility] Progress Note  Billing code:32        NAME: Suresh White  AGE: [de-identified] y o  SEX: female    DATE OF ENCOUNTER: 9/27/2019    Assessment and Plan     Problem List Items Addressed This Visit        Digestive    Ileus [facility]) - Primary     Most recent abdominal x-ray showed improvement from abdominal x-ray performed 9/1  Image revealed dilated loops of colon, no masses or calcifications  Colonic fecal residue noted  Patient denies any abdominal pain, nausea or vomiting today  Has been passing flatus as well as small bowel movements  Will continue to monitor  Continue Zofran p r n  Endocrine    Diabetes mellitus ([facility] ) (Chronic)       Lab Results   Component Value Date    HGBA1C 8 5 05/16/2019   Patient's blood sugars have been fluctuating  Accu-Cheks range between 107-359  Per nursing staff, patient only tolerated very small amounts due to persistent nausea  However she does occasionally have snacks contributing to elevated blood sugars  Would avoid very tight control in this patient given decreased oral intake and increased risk for hypo glycemia  Will continue to monitor blood sugars for now and defer any change in current regimen  Continue NovoLog 10 units with meals, Lantus 42 units HS  Continue Accu-Chek monitoring  Counseled patient in detail about adherence to a diabetic, heart healthy diet  Will continue to monitor         Hyperthyroidism     Patient continues on methimazole 2 5 mg every 2 days  Previously followed with endocrinology  Will continue to monitor            Cardiovascular and Mediastinum    Atrial fibrillation (HCC) (Chronic)     Patient denies any cardiorespiratory distress  Patient continues in AFib though rate controlled  Continue Xarelto 20 mg p o  Daily  Continue digoxin 0 125 mg p o   Daily  Will continue to monitor            Nervous and Auditory    Late onset Alzheimer's disease with behavioral disturbance (Chronic)     Continue reorientation and redirection as needed  Manage chronic conditions  Maintain [place] precautions  Encourage patient to participate in social activities at nursing home  Continue supportive care  Patient was calm, interactive and cooperative during interview            Other    Bipolar disorder (HCC) (Chronic)     Mood has remained stable per nursing staff  No expressions of suicidal/homicidal behavior  Continue gabapentin 100 mg p o  Q 8h, continue mirtazapine 15 mg p o  Daily  Encourage patient to participate in social activities at nursing home  Continued social support  Will continue to monitor         Sunday 94 secondary to chronic medical conditions  Patient continues to require 24/7 supervision and assistance with ADLs in order to function as per her baseline  Maintain [place] precautions  Manage chronic conditions  Continue supportive care  Patient encouraged to participate in social activities at nursing home         Other constipation     Patient has been having small bowel movements daily per nursing staff  Continue senna 8 6 mg tabs, 2 tabs daily  Continue nursing home p r n  Bowel regimen  Will continue to monitor as goal would be to avoid constipation for history of ileus         Other insomnia       Continue melatonin 6 mg p o  HS  Continue mirtazapine 15 mg p o  HS  Discussed with patient, the importance of incorporating physical activity into part of her daily routine  Recommended avoiding daytime naps  Avoid caffeinated beverages after 14:00  Will continue to monitor                   - Counseling Documentation: patient was counseled regarding: diagnostic results, instructions for management, risk factor reductions, prognosis, patient and family education, impressions, risks and benefits of treatment options and importance of compliance with treatment  Discussed in detail about chronic medical conditions        Chief Complaint   Patient seen for routine monthly long-term care nursing home rounds for follow-up of chronic conditions      History of Present Illness     HPI  Patient seen and examined at bedside for routine monthly long-term care nursing home rounds for follow-up of any acute and chronic conditions  Patient continues to have chronic nausea and complaints of occasional abdominal discomfort  Patient did have multiple  abdominal x-rays recently which showed improving ileus  She was restarted on her usual diet after tolerating a stepwise progression in diet consistency  This morning, the patient is very calm, pleasant and interactive  She continues to be compliant with digoxin and Xarelto for her history of atrial fibrillation and continues on methimazole for a history of hyperthyroidism  She continues on senna tablets daily for history of constipation and for chronic pain and she also has been compliant with gabapentin and oxycodone  Patient's blood sugars continue to fluctuate due to decreased oral intake  Will continue Lantus and NovoLog at current regimen for her history of diabetes given patient's increased risk for hypoglycemia with tight diabetic control with decreased oral intake  The following portions of the patient's history were reviewed and updated as appropriate: allergies, current medications, past family history, past medical history, past social history, past surgical history and problem list     Review of Systems     Review of Systems   Constitutional: Positive for activity change (2/2 chronic nausea) and appetite change (Decreased)  Negative for chills and fever  HENT: Negative for congestion, ear pain, rhinorrhea and sore throat  Eyes: Negative for discharge  Respiratory: Negative for cough, chest tightness, shortness of breath, wheezing and stridor  Cardiovascular: Negative for chest pain, palpitations and leg swelling     Gastrointestinal: Positive for abdominal distention (Chronic), abdominal pain (Occasional abdominal discomfort, chronic) and constipation (Chronic)  Negative for diarrhea, nausea and vomiting  Genitourinary: Negative for decreased urine volume and difficulty urinating  Musculoskeletal: Positive for arthralgias (Chronic) and back pain (Chronic)  Skin: Negative for color change, pallor, rash and wound  Neurological: Negative for dizziness, weakness and headaches  Hematological: Does not bruise/bleed easily  Psychiatric/Behavioral: Negative for agitation and confusion  Active Problem List     Patient Active Problem List   Diagnosis    Diabetes mellitus ([place] [address] )    Pulmonary embolism ([place] [address] )    [facility]) 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)    Nausea       Objective     /76, temp 99 2°, HR 92, weight 189 5, O2 sat 95%, blood sugar 147, RR 20    Physical Exam   Constitutional: She is oriented to person, place, and time  She appears well-developed and well-nourished  No distress  Elderly female lying in bed in no obvious cardiorespiratory or painful distress   HENT:   Head: Normocephalic and atraumatic  Right Ear: External ear normal    Left Ear: External ear normal    Nose: Nose normal    Mouth/Throat: Oropharynx is clear and moist    Eyes: Conjunctivae are normal  Right eye exhibits no discharge  Left eye exhibits no discharge  No scleral icterus  Neck: Neck supple  No JVD present  Cardiovascular: Normal rate and intact distal pulses  Exam reveals no gallop and no friction rub  Murmur (ESM 2/6) heard  Heart rate irregularly irregular   Pulmonary/Chest: Effort normal  No stridor  No respiratory distress  She has no wheezes  She has rales  Air entry fair bilaterally  Minimal fine rales in lower lung bases   Abdominal: Bowel sounds are normal  She exhibits distension (Chronic)  She exhibits no mass  There is no tenderness  There is no rebound and no guarding  Abdomen notably distended but soft  This is patient's baseline   Musculoskeletal: She exhibits no edema, tenderness or deformity  Neurological: She is alert and oriented to person, place, and time  She has normal reflexes  No cranial nerve deficit  Follows commands  Moving all limbs   Skin: Skin is warm  No rash noted  She is not diaphoretic  No erythema  No pallor  Psychiatric:   Patient calm, pleasant and cooperative   Nursing note and vitals reviewed  Pertinent Laboratory/Diagnostic Studies:  Reviewed recent BMP which showed glucose 318, BUN 11, creatinine 0 74, K 3 9, , calcium 8 6, GFR 77    Abdominal x-ray done 09/05/2019:  Showed colonic ileus pattern    EKG done 9/24:  , atrial fibrillation     Current Medications   Medications were reviewed and updated in facility paper chart      Humberto Rodriguez MD  Geriatric Medicine  9/27/2019 7:11 AM

## 2019-09-27 NOTE — ASSESSMENT & PLAN NOTE
Patient has been having small bowel movements daily per nursing staff  Continue senna 8 6 mg tabs, 2 tabs daily  Continue nursing home p r n   Bowel regimen  Will continue to monitor as goal would be to avoid constipation for history of ileus

## 2019-09-27 NOTE — ASSESSMENT & PLAN NOTE
Debility secondary to chronic medical conditions  Patient continues to require 24/7 supervision and assistance with ADLs in order to function as per her baseline    Maintain [place] precautions  Manage chronic conditions  Continue supportive care  Patient encouraged to participate in social activities at nursing home

## 2019-09-27 NOTE — ASSESSMENT & PLAN NOTE
Mood has remained stable per nursing staff  No expressions of suicidal/homicidal behavior  Continue gabapentin 100 mg p o  Q 8h, continue mirtazapine 15 mg p o   Daily  Encourage patient to participate in social activities at nursing home  Continued social support  Will continue to monitor

## 2019-09-27 NOTE — ASSESSMENT & PLAN NOTE
History of PE  Currently no cardiorespiratory distress  Continue anticoagulation with Xarelto  Will continue to monitor

## 2019-09-27 NOTE — ASSESSMENT & PLAN NOTE
Continue melatonin 6 mg p o  HS  Continue mirtazapine 15 mg p o   HS  Discussed with patient, the importance of incorporating physical activity into part of her daily routine  Recommended avoiding daytime naps  Avoid caffeinated beverages after 14:00  Will continue to monitor

## 2019-09-27 NOTE — ASSESSMENT & PLAN NOTE
Continue reorientation and redirection as needed  Manage chronic conditions  Maintain [place] precautions  Encourage patient to participate in social activities at nursing home  Continue supportive care  Patient was calm, interactive and cooperative during interview

## 2019-09-27 NOTE — ASSESSMENT & PLAN NOTE
Most recent abdominal x-ray showed improvement from abdominal x-ray performed 9/1  Image revealed dilated loops of colon, no masses or calcifications  Colonic fecal residue noted  Patient denies any abdominal pain, nausea or vomiting today  Has been passing flatus as well as small bowel movements  Will continue to monitor  Continue Zofran p r n

## 2019-09-27 NOTE — ASSESSMENT & PLAN NOTE
Lab Results   Component Value Date    HGBA1C 8 5 05/16/2019   Patient's blood sugars have been fluctuating  Accu-Cheks range between 107-359  Per nursing staff, patient only tolerated very small amounts due to persistent nausea  However she does occasionally have snacks contributing to elevated blood sugars    Would avoid very tight control in this patient given decreased oral intake and increased risk for hypo glycemia  Will continue to monitor blood sugars for now and defer any change in current regimen  Continue NovoLog 10 units with meals, Lantus 42 units HS  Continue Accu-Chek monitoring  Counseled patient in detail about adherence to a diabetic, heart healthy diet  Will continue to monitor

## 2019-09-27 NOTE — ASSESSMENT & PLAN NOTE
Patient continues on methimazole 2 5 mg every 2 days  Previously followed with endocrinology  Will continue to monitor

## 2019-09-27 NOTE — ASSESSMENT & PLAN NOTE
Patient denies any cardiorespiratory distress  Patient continues in AFib though rate controlled  Continue Xarelto 20 mg p o  Daily  Continue digoxin 0 125 mg p o   Daily  Will continue to monitor

## 2019-10-15 ENCOUNTER — NURSING HOME VISIT (OUTPATIENT)
Dept: GERIATRICS | Facility: OTHER | Age: 79
End: 2019-10-15
Payer: MEDICARE

## 2019-10-15 DIAGNOSIS — L50.9: Primary | ICD-10-CM

## 2019-10-15 PROCEDURE — 99307 SBSQ NF CARE SF MDM 10: CPT | Performed by: NURSE PRACTITIONER

## 2019-10-16 NOTE — PROGRESS NOTES
[place] [address] [address]  [phone]) [place] [address]  Code  32      NAME: Heather Olmedo  AGE: [de-identified] y o  SEX: female    : 1940    Code Status: DNR/DNI/DNH    DATE OF ENCOUNTER: 10/16/2019    Assessment and Plan     Problem List Items Addressed This Visit        Musculoskeletal and Integument    Urticaria at injection site - Primary     -Minimal reaction to influenza vaccine injection site @ L deltoid   -large raised area; erythematous, firm and warm    -C/o pruritis only; no decreased sensation   -If s/sx persist, t/c short peroid of antihistamine   -start ice to affected site 20 min q shift x 2 days and Tylenol PRN  -Continue to monitor for improvement and or worsening in condition   -DWN                 No orders of the defined types were placed in this encounter  Chief Complaint     Left deltoid edema and redness     History of Present Illness     [de-identified] yo female, resident of [address] [place] [address], seen in collaboration with nursing to evaluate new left upper arm redness  Resident received the influenza vaccine on 10/13  She has since developed a large raised, erythematous area in the region of her left deltoid which is where the vaccine was administered 2 days prior  No other concerns from nursing  Upon exam, resident resting in bed with no s/sx of distress or discomfort  She c/o pruritis, warmth and mild discomfort at the site of injection  She denies previous reaction  She denies decreased appetite, shortness of breath, chills, or chest pain  The following portions of the patient's history were reviewed and updated as appropriate: allergies, current medications, past medical history and problem list     Review of Systems     Review of Systems   Constitutional: Negative for activity change, appetite change, chills, fatigue and fever  Skin: Positive for color change          C/o pruritis of the affected site Psychiatric/Behavioral: The patient is not nervous/anxious  Active Problem List     Patient Active Problem List   Diagnosis    [facility]    [facility] embolism ([facility] )    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, of the knee    Other constipation    Neurogenic bladder    Acute midline low back pain    Other insomnia    Ileus ([facility] )    Nausea    Urticaria at injection site       Objective       Physical Exam   Constitutional: No distress  Appearing chronically ill    Cardiovascular:   + radial pulses    Skin: She is not diaphoretic  There is erythema (edematous, firm and warm left deltoid )  Psychiatric:   Pleasant and cooperative during exam    Nursing note and vitals reviewed  Pertinent Laboratory/Diagnostic Studies:  n/a    Current Medications       Medications reviewed and updated, see facility [facility] for details         Lazarus Eth, CRNP   [facility]  10/16/2019 6:42 AM

## 2019-10-16 NOTE — ASSESSMENT & PLAN NOTE
-Minimal reaction to influenza vaccine injection site @ L deltoid   -large raised area; erythematous, firm and warm    -C/o pruritis only; no decreased sensation   -If s/sx persist, t/c short peroid of antihistamine   -start ice to affected site 20 min q shift x 2 days and Tylenol PRN  -Continue to monitor for improvement and or worsening in condition   -DWN

## 2019-11-15 ENCOUNTER — NURSING HOME VISIT (OUTPATIENT)
Dept: GERIATRICS | Facility: OTHER | Age: 79
End: 2019-11-15
Payer: MEDICARE

## 2019-11-15 DIAGNOSIS — R53.81 DEBILITY: ICD-10-CM

## 2019-11-15 DIAGNOSIS — Z79.4 TYPE 2 DIABETES MELLITUS WITHOUT COMPLICATION, WITH LONG-TERM CURRENT USE OF INSULIN (HCC): Chronic | ICD-10-CM

## 2019-11-15 DIAGNOSIS — F02.81 LATE ONSET ALZHEIMER'S DISEASE WITH BEHAVIORAL DISTURBANCE (HCC): Primary | Chronic | ICD-10-CM

## 2019-11-15 DIAGNOSIS — G30.1 LATE ONSET ALZHEIMER'S DISEASE WITH BEHAVIORAL DISTURBANCE (HCC): Primary | Chronic | ICD-10-CM

## 2019-11-15 DIAGNOSIS — E05.90 HYPERTHYROIDISM: ICD-10-CM

## 2019-11-15 DIAGNOSIS — F31.9 BIPOLAR AFFECTIVE DISORDER, REMISSION STATUS UNSPECIFIED (HCC): Chronic | ICD-10-CM

## 2019-11-15 DIAGNOSIS — I48.91 ATRIAL FIBRILLATION, UNSPECIFIED TYPE (HCC): Chronic | ICD-10-CM

## 2019-11-15 DIAGNOSIS — G47.09 OTHER INSOMNIA: ICD-10-CM

## 2019-11-15 DIAGNOSIS — E11.9 TYPE 2 DIABETES MELLITUS WITHOUT COMPLICATION, WITH LONG-TERM CURRENT USE OF INSULIN (HCC): Chronic | ICD-10-CM

## 2019-11-15 DIAGNOSIS — N31.9 NEUROGENIC BLADDER: ICD-10-CM

## 2019-11-15 DIAGNOSIS — M11.269 CHONDROCALCINOSIS DUE TO DICALCIUM PHOSPHATE CRYSTALS, OF THE KNEE, UNSPECIFIED LATERALITY: ICD-10-CM

## 2019-11-15 DIAGNOSIS — K59.09 OTHER CONSTIPATION: ICD-10-CM

## 2019-11-15 PROCEDURE — 99309 SBSQ NF CARE MODERATE MDM 30: CPT | Performed by: NURSE PRACTITIONER

## 2019-11-15 NOTE — PROGRESS NOTES
[address] [facility]  [address]  [phone] [place] [address]  Code 32    NAME: Laron Gabriel  AGE: [de-identified] y o   SEX: female    : 1940    Code Status: AND/DNR/DNH    DATE OF ENCOUNTER: 11/15/2019    Assessment and Plan     Problem List Items Addressed This Visit        Endocrine    Diabetes mellitus ([address] ) (Chronic)     -last hgb A1c 8 5% (2019)  -reviewed BG logs; fasting BG 180s-mid 200s; BG between meals specifically before lunch and at HS--> mid to high 200s   -asymptomatic except for complaints of increased thirst   -increase novolog to 12 units at breakfast and dinner and continue 10 units at lunch   -increase lantus from 42 to 44 units at HS   -will review BG logs in 3 weeks with nursing or sooner if clinical status changes   -continue CCD diet and encourage healthy lifestyle modifications   -hgb A1c already ordered for next month   -continue to monitor   -DWN         Hyperthyroidism     -no acute change in condition   -stable and doing well with methimazole 2 5 mg q 2 days   -last TSH 2 10 (2019)  -c/s Endocrinology as needed             Cardiovascular and Mediastinum    Atrial fibrillation (HCC) (Chronic)     -HR logs reviewed and stable  -denies s/sx   -continue [facility] management with xarelto 20 mg daily and rate control with digoxin 0 125 mg daily             Nervous and Auditory    Late onset Alzheimer's disease with behavioral disturbance (HCC) - Primary (Chronic)     -no acute change in clinical condition   -requiring 24/7 support at LTCF with all ADLs  -weight stable; continue monitoring   -tolerating regular consistency diet; c/s SLP as needed   -continue to encourage activity and socialization as tolerated   -continue medication management with mirtazapine 15 mg and melatonin 6 mg at HS (recently increased from 3 to 6 mg by psych in 2019)  -continue psych consults   -continue to monitor for any change in clinical condition   -DWN Musculoskeletal and Integument    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, of the knee     -stable and doing well with Bengay topical, PRN oxycodone and colchicine 0 6 mg daily   -continue to monitor             Other    Bipolar disorder (HCC) (Chronic)     -stable with no acute change in mood or behavior   -continue to provide safe and supportive environment   -continue to monitor and consult psych as needed          Debility     -secondary to her chronic medical conditions   -continue 24/7 support at LTCF         Other constipation     -no s/sx on exam and denies complaints during visit   -continue senna, 2 tabs daily  -continue to monitor         Neurogenic bladder     -continue suprapubic catheter and nursing care as per facility's practice   -consult urology as needed          Other insomnia     -improved since increase in melatonin   -continue to promote sleep hygiene   -continue melatonin 6 mg at HS                No orders of the defined types were placed in this encounter  Chief Complaint     Follow-up of chronic medical conditions  History of Present Illness     [de-identified] yo female, resident of [address] [facility], seen in collaboration with nursing to evaluate her chronic medical conditions including but not limited to Dementia, Hyperthyroidism, atrial fibrillation, DM II and debility  No concerns from nursing  Upon exam, resident resting comfortably in bed with no s/sx of distress or discomfort  She denies headache, decreased appeite, difficulty sleeping, pain, shortness of breath, chest pain, N/V/D or constipation  The following portions of the patient's history were reviewed and updated as appropriate: allergies, current medications, past family history, past medical history and problem list     Review of Systems     Review of Systems   Constitutional: Positive for activity change (2/2 to chronic medical conditions)   Negative for appetite change, diaphoresis and fatigue  HENT: Negative  Respiratory: Negative for cough, choking, chest tightness and shortness of breath  Cardiovascular: Negative for chest pain, palpitations and leg swelling  Gastrointestinal: Positive for abdominal distention (chronic )  Negative for abdominal pain, constipation, diarrhea, nausea and vomiting  Endocrine: Positive for polydipsia  Negative for cold intolerance, heat intolerance, polyphagia and polyuria  Genitourinary: Negative for pelvic pain  Neurogenic bladder     Musculoskeletal: Positive for arthralgias (chronic; knees )  Neurological: Negative for dizziness, speech difficulty, light-headedness and headaches  Psychiatric/Behavioral: Negative for sleep disturbance  The patient is not nervous/anxious  Active Problem List     Patient Active Problem List   Diagnosis    [facility]    Pulmonary embolism ([place] [address] )    [facility]) [facility])    Atrial fibrillation (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)    Nausea    Urticaria at injection site       Objective     /70, HR 84, T 97 5, RR 20, 98% RA, weight: 190 lbs (stable)    Physical Exam   Constitutional: No distress  Appearing chronically ill    Eyes: Conjunctivae are normal  Right eye exhibits no discharge  Left eye exhibits no discharge  Cardiovascular: Normal rate and intact distal pulses  Murmur heard  Irregular rhythm; No edema    Pulmonary/Chest: Effort normal and breath sounds normal  No respiratory distress  She has no wheezes  She has no rales  Abdominal: Soft  Bowel sounds are normal  She exhibits distension (mild; baseline )  There is no tenderness     Genitourinary:   Genitourinary Comments: Suprapubic catheter draining clear yellow urine    Musculoskeletal: She exhibits tenderness (knees; chronic)  She exhibits no edema  ROM mild to moderately limited 2/2 to chronic B/L LE pain    Neurological: She is alert  Oriented to name and     Skin: Skin is warm and dry  She is not diaphoretic  No erythema  No pallor  Psychiatric:   Pleasant and cooperative    Nursing note and vitals reviewed  Pertinent Laboratory/Diagnostic Studies:  Reviewed   BMP 2019  Dig 2019  CBC 2019- at next progress visit, t/c ordering annual lab work for monitoring purposes   TSH 2019    Consults  Dentist 2019  Podiatry 10/2019  Opthalmology 2019  [facility] 2018  Psych 2019    Current Medications       Medications reviewed and updated, see facility [facility] for details  PRN usage: Administered oxycodone x 29, Zofran x 1 and Tylenol x 9 in the past 30 days       MARIANA Begum   [facility]  11/15/2019 5:58 PM

## 2019-11-15 NOTE — ASSESSMENT & PLAN NOTE
-no acute change in condition   -stable and doing well with methimazole 2 5 mg q 2 days   -last TSH 2 10 (5/2019)  -c/s Endocrinology as needed

## 2019-11-15 NOTE — ASSESSMENT & PLAN NOTE
-improved since increase in melatonin   -continue to promote sleep hygiene   -continue melatonin 6 mg at HS

## 2019-11-15 NOTE — ASSESSMENT & PLAN NOTE
-continue suprapubic catheter and nursing care as per facility's practice   -consult urology as needed

## 2019-11-15 NOTE — ASSESSMENT & PLAN NOTE
-no acute change in clinical condition   -requiring 24/7 support at LTCF with all ADLs  -weight stable; continue monitoring   -tolerating regular consistency diet; c/s SLP as needed   -continue to encourage activity and socialization as tolerated   -continue medication management with mirtazapine 15 mg and melatonin 6 mg at  (recently increased from 3 to 6 mg by psych in 9/2019)  -continue psych consults   -continue to monitor for any change in clinical condition   -KALI

## 2019-11-15 NOTE — ASSESSMENT & PLAN NOTE
-stable and doing well with Bengay topical, PRN oxycodone and colchicine 0 6 mg daily   -continue to monitor

## 2019-11-15 NOTE — ASSESSMENT & PLAN NOTE
-stable with no acute change in mood or behavior   -continue to provide safe and supportive environment   -continue to monitor and consult psych as needed

## 2019-11-15 NOTE — ASSESSMENT & PLAN NOTE
-last hgb A1c 8 5% (5/2019)  -reviewed BG logs; fasting BG 180s-mid 200s; BG between meals specifically before lunch and at HS--> mid to high 200s   -asymptomatic except for complaints of increased thirst   -increase novolog to 12 units at breakfast and dinner and continue 10 units at lunch   -increase lantus from 42 to 44 units at HS   -will review BG logs in 3 weeks with nursing or sooner if clinical status changes   -continue [facility] diet and encourage healthy lifestyle modifications   -hgb A1c already ordered for next month   -continue to monitor   -DWN

## 2019-11-15 NOTE — ASSESSMENT & PLAN NOTE
-HR logs reviewed and stable  -charles s/sx   -continue [facility] management with xarelto 20 mg daily and rate control with digoxin 0 125 mg daily

## 2019-11-15 NOTE — ASSESSMENT & PLAN NOTE
-no s/sx on exam and denies complaints during visit   -continue senna, 2 tabs daily  -continue to monitor

## 2020-01-09 ENCOUNTER — NURSING HOME VISIT (OUTPATIENT)
Dept: GERIATRICS | Facility: OTHER | Age: 80
End: 2020-01-09
Payer: MEDICARE

## 2020-01-09 DIAGNOSIS — L30.9 DERMATITIS: Primary | ICD-10-CM

## 2020-01-09 PROCEDURE — 99308 SBSQ NF CARE LOW MDM 20: CPT | Performed by: NURSE PRACTITIONER

## 2020-01-10 PROBLEM — L30.9 DERMATITIS: Status: ACTIVE | Noted: 2020-01-10

## 2020-01-10 NOTE — PROGRESS NOTES
[facility]  [address]  [phone]) [place] [address]  Code 32    NAME: Wendy Carrillo  AGE: 78 y o  SEX: female    : 1940    Code Status: AND/DNR/DNH    DATE OF ENCOUNTER: 2020    Assessment and Plan     Problem List Items Addressed This Visit        Musculoskeletal and Integument    Dermatitis - Primary     -new rash to bilateral upper extremities; + erythematous  and scaly plaques scattered on bilateral UE   -allergies and medication list reviewed    -no recent changes in diet and no new exposures or medication changes except for sarna topical lotion (ordered last month for buttock and per nursing was being applied to B/L arms for recent pruritis)   -denies fever, chills, shortness of breath or nausea   -only complains of moderate pruritis of bilateral arm   -likely allergic contact dermatitis   -discontinue sarna   -start cetirizine 5 mg daily and hydrocortone topical cream BID x 7 days   -check temperature BID x 3 days   -continue to monitor for improvement and or worsening in condition   -DW                No orders of the defined types were placed in this encounter  Chief Complaint     Rash    History of Present Illness     79 yo female, resident of [address], seen in collaboration with nursing to evaluate new rash on bilateral arms and shoulders  No other concerns from nursing  Upon exam, resident resting comfortably in her bed with no acute s/sx of respiratory distress or discomfort  She denies chills, fever, shortness of breath, decreased appetite or nausea  She only complaints of moderate pruritis of the affected areas and is requesting an intervention to give her relief  Resident has had no recent changes in diet and no new exposures or medication changes except for sarna topical lotion (ordered last month for buttock and per nursing was being applied to B/L arms for recent complaints of pruritis)  The following portions of the patient's history were reviewed and updated as appropriate: allergies, current medications, past medical history and problem list     Review of Systems     Review of Systems   Constitutional: Positive for activity change (2/2 to chronic medical conditions)  Negative for appetite change, chills, fatigue and fever  Respiratory: Negative for chest tightness and shortness of breath  Cardiovascular: Negative for chest pain  Gastrointestinal: Negative for nausea  Skin: Positive for rash  C/o pruritis      Neurological: Negative for dizziness and headaches  Active Problem List     Patient Active Problem List   Diagnosis    [facility] mellitus ([facility] )    Pulmonary embolism ([facility] )    [facility]    Atrial fibrillation ([facility])    Bipolar disorder ([facility] )    Late onset Alzheimer's disease with behavioral disturbance ([facility] )    Hyperthyroidism    Debility    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, of the knee    Other constipation    Neurogenic bladder    Acute midline low back pain    Other insomnia    Ileus ([facility])    Nausea    Urticaria at injection site    Dermatitis       Objective     /84 , RR 16    Physical Exam   Constitutional: No distress  Appearing chronically ill    Pulmonary/Chest: No respiratory distress  Neurological: She is alert  Skin: Skin is warm, dry and intact  Capillary refill takes less than 2 seconds  Rash noted  She is not diaphoretic  There is erythema  No pallor  erythematous  and scaly plaques scattered on bilateral UE      Psychiatric:   cooperative during exam    Nursing note and vitals reviewed  Pertinent Laboratory/Diagnostic Studies:  Reviewed     Current Medications       Medications reviewed and updated, see facility [facility] for details         MARIANA Alves   [facility]  1/10/2020 6:30 PM

## 2020-01-10 NOTE — ASSESSMENT & PLAN NOTE
-new rash to bilateral upper extremities; + erythematous  and scaly plaques scattered on bilateral UE   -allergies and medication list reviewed    -no recent changes in diet and no new exposures or medication changes except for sarna topical lotion (ordered last month for buttock and per nursing was being applied to B/L arms for recent pruritis)   -denies fever, chills, shortness of breath or nausea   -only complains of moderate pruritis of bilateral arm   -likely allergic contact dermatitis   -discontinue sarna   -start cetirizine 5 mg daily and hydrocortone topical cream BID x 7 days   -check temperature BID x 3 days   -continue to monitor for improvement and or worsening in condition   -KALI

## 2020-01-11 ENCOUNTER — NURSING HOME VISIT (OUTPATIENT)
Dept: GERIATRICS | Facility: OTHER | Age: 80
End: 2020-01-11
Payer: MEDICARE

## 2020-01-11 DIAGNOSIS — G30.1 LATE ONSET ALZHEIMER'S DISEASE WITH BEHAVIORAL DISTURBANCE (HCC): Primary | Chronic | ICD-10-CM

## 2020-01-11 DIAGNOSIS — N31.9 NEUROGENIC BLADDER: ICD-10-CM

## 2020-01-11 DIAGNOSIS — F31.9 BIPOLAR AFFECTIVE DISORDER, REMISSION STATUS UNSPECIFIED (HCC): Chronic | ICD-10-CM

## 2020-01-11 DIAGNOSIS — E05.90 HYPERTHYROIDISM: ICD-10-CM

## 2020-01-11 DIAGNOSIS — E11.9 TYPE 2 DIABETES MELLITUS WITHOUT COMPLICATION, WITH LONG-TERM CURRENT USE OF INSULIN (HCC): Chronic | ICD-10-CM

## 2020-01-11 DIAGNOSIS — I48.91 ATRIAL FIBRILLATION, UNSPECIFIED TYPE (HCC): Chronic | ICD-10-CM

## 2020-01-11 DIAGNOSIS — L30.9 DERMATITIS: ICD-10-CM

## 2020-01-11 DIAGNOSIS — R53.81 DEBILITY: ICD-10-CM

## 2020-01-11 DIAGNOSIS — Z79.4 TYPE 2 DIABETES MELLITUS WITHOUT COMPLICATION, WITH LONG-TERM CURRENT USE OF INSULIN (HCC): Chronic | ICD-10-CM

## 2020-01-11 DIAGNOSIS — F02.81 LATE ONSET ALZHEIMER'S DISEASE WITH BEHAVIORAL DISTURBANCE (HCC): Primary | Chronic | ICD-10-CM

## 2020-01-11 PROCEDURE — 99309 SBSQ NF CARE MODERATE MDM 30: CPT | Performed by: INTERNAL MEDICINE

## 2020-01-12 PROBLEM — M54.50 ACUTE MIDLINE LOW BACK PAIN: Status: RESOLVED | Noted: 2019-08-21 | Resolved: 2020-01-12

## 2020-01-12 PROBLEM — R11.0 NAUSEA: Status: RESOLVED | Noted: 2019-09-24 | Resolved: 2020-01-12

## 2020-01-12 PROBLEM — L50.9: Status: RESOLVED | Noted: 2019-10-15 | Resolved: 2020-01-12

## 2020-01-12 NOTE — PROGRESS NOTES
[facility]  [address] [place] [address]  Facility: [address] [facility]    NAME: Layne Speaker  AGE: 78 y o  SEX: female    DATE OF ENCOUNTER: 1/11/2020    Code status:  AND/DNR/DNH    Assessment and Plan   1  Late onset Alzheimer's disease with behavioral disturbance    -she is doing well at long-term care facility and without medication therapy  Continue with care/support at long-term care facility and monitoring  2  Hyperthyroidism    -she is doing well with methimazole  Continue with clinical and periodic laboratory monitoring  3  Debility secondary to her chronic medical conditions    -she still requires 24/7 care/support of her ADLs  Continue with care/support of her ADLs at long-term care facility  Continue with monitoring  4  Eczematous dermatitis of bilateral upper extremities    -improving with topical triamcinolone  Continue with monitoring  5  Bipolar disorder    -doing well and following with psychiatry service  6  Atrial fibrillation    -she is doing well with Xarelto and digoxin therapy  Continue with clinical and periodic laboratory monitoring  7  Insulin-requiring type 2 diabetes mellitus    -she is doing well with Lantus and NovoLog insulins  Her Lantus order dosage was recently increased in December 2019  I have set her target for a hemoglobin A1c to be less than 8 5% given her comorbidities and goals of care  8  Chronic chondrocalcinosis    -doing well with allopurinol therapy  Continue with clinical and periodic laboratory monitoring     9     Neurogenic bladder    -doing well with suprapubic catheter  She is to follow up with her urologist in June 2020  10  Chronic pain-    -doing well with change of medication to duloxetine by psychiatry service  Continue with monitoring  11  Goals of care    -DNR/AND/DNH    All medications and routine orders were reviewed and updated as needed      Plan discussed with: Nurse    Chief Complaint     She is seen with nursing staff for a follow-up visit to update the care and treatment of her late onset Alzheimer's dementia with behavior disturbance, insulin-requiring type 2 diabetes mellitus, hyperthyroidism, atrial fibrillation, debility secondary to her chronic medical conditions, and dermatitis on her arms  History of Present Illness     She is seen with nursing staff for a follow-up visit to update the care and treatment of her late onset Alzheimer's dementia with behavioral disturbance-doing well with care/support of her ADLs at long-term care facility, insulin-requiring type 2 diabetes mellitus-doing well with Lantus/NovoLog insulins, hyperthyroidism-doing well with methimazole every other day, atrial fibrillation-doing well with Xarelto and digoxin, debility secondary to her chronic medical conditions-she still requires 24/7 care/support of her ADLs, and dermatitis on her arms-she reports feeling better with topical triamcinolone and oral Zyrtec  She was started on treatment for eczema of her bilateral arms on January 9, 2020  She was started on triamcinolone topical cream and Zyrtec for itching  She states that it is starting to help her symptoms  When asked, nursing reports that her bowels are chronically loose but she has no constipation  Nursing reports that she tends to remain in bed in her room  She reports that her leg and knee pain are doing better and now they only bothers her occasionally  She is upset because she has not seen her daughter in about a week's time, but overall she reports being in a better mood with duloxetine  The following portions of the patient's history were reviewed and updated as appropriate: current medications, past family history, past medical history, past social history, past surgical history and problem list     Allergies:   Allergies   Allergen Reactions    Risperdal [Risperidone]        Review of Systems Review of Systems   Constitutional: Negative for appetite change  Respiratory: Negative for shortness of breath  Cardiovascular: Negative for chest pain and palpitations  Gastrointestinal: Negative for abdominal pain and constipation  Musculoskeletal:        See HPI   Psychiatric/Behavioral: Positive for dysphoric mood (See HPI)  Medications and orders     All medications reviewed and updated in Nursing Home EMR  Objective     Vitals:  Weight 187 lb (stable), pulse 83, respiratory rate 20, blood pressure 122/59, temperature 97 4° F, pulse oximetry 95% on room air  Physical Exam   Constitutional: Vital signs are normal  She appears well-developed and well-nourished  She is cooperative  Non-toxic appearance  No distress  She awake, interactive, and appears comfortable lying in bed  She appears slightly younger than her stated age, chronically ill, and frail  Eyes: No scleral icterus  Cardiovascular: Normal rate, regular rhythm and normal heart sounds  Exam reveals no gallop and no friction rub  No murmur heard  Pulmonary/Chest: Effort normal and breath sounds normal  No stridor  No respiratory distress  She has no wheezes  She has no rales  Abdominal: Soft  She exhibits distension (Mildly distended, soft, and nontender)  She exhibits no mass  There is no tenderness  There is no guarding  Musculoskeletal: She exhibits no edema  Neurological: She is alert  Skin:   There is an eczematous rash on her bilateral upper extremities  Vitals reviewed  Pertinent Laboratory/Diagnostic Studies: There has been no recent laboratory testing     - Treatment plan reviewed with nursing staff      NANY Kaplan   1/12/2020 2:11 PM

## 2020-01-19 ENCOUNTER — TELEPHONE (OUTPATIENT)
Dept: OTHER | Facility: OTHER | Age: 80
End: 2020-01-19

## 2020-02-04 ENCOUNTER — NURSING HOME VISIT (OUTPATIENT)
Dept: GERIATRICS | Facility: OTHER | Age: 80
End: 2020-02-04
Payer: MEDICARE

## 2020-02-04 DIAGNOSIS — R21 RASH AND OTHER NONSPECIFIC SKIN ERUPTION: Primary | ICD-10-CM

## 2020-02-04 PROCEDURE — 99308 SBSQ NF CARE LOW MDM 20: CPT | Performed by: NURSE PRACTITIONER

## 2020-02-04 NOTE — PROGRESS NOTES
[facility]  [address]  [phone]) [address] [place] [address]  Code 32      NAME: Leslie Montejo  AGE: 78 y o  SEX: female    : 1940    Code Status: AND/DNR/DNH    DATE OF ENCOUNTER: 2020    Assessment and Plan     Problem List Items Addressed This Visit        Musculoskeletal and Integument    Rash and other nonspecific skin eruption - Primary     + large erythematous and scaly plaque on left UE with scattered areas of similar lesions on chest   + lichenification of upper extremities secondary to excessive scratching   -only c/o of constant moderate pruritis   -no recent medication changes/environmental exposures   -treated for minimal pruritis and dermatitis last month with hydrocortisone cream (partial relief)  -now s/sx recurrent and worsened   -start clobetasol 0 05% BID x 2 weeks   -resident also requesting dermatology consult for further evaluation and recommendation  -continue to monitor for improvement and or worsening in condition   -discussed with nursing                No orders of the defined types were placed in this encounter  Chief Complaint     Rash and pruritis     History of Present Illness     79 yo female, resident of [address] [place] [address], seen in collaboration with nursing to evaluate continued rash and pruritis of bilateral upper extremities  Of not resident was treated for eczema/dermatitis last month with hydrocortisone with partial relief  No other concerns from nursing  Upon exam, resident resting comfortably in bed with no acute s/sx of distress or discomfort  She reports that the steroid cream helped in the beginning, but now the itching is back where is started and worse  Resident is now scratching to the point of breaking open her skin  She denies fever, chills, decreased appetite, shortness of breath, chest pain, or N/V/D          The following portions of the patient's history were reviewed and updated as appropriate: allergies, current medications, past medical history and problem list     Review of Systems     Review of Systems   Constitutional: Positive for activity change (2/2 to chronic medical conditions )  Negative for appetite change, chills and fever  HENT: Negative  Respiratory: Negative for shortness of breath  Cardiovascular: Negative for chest pain and palpitations  Skin: Positive for rash  Pruritis    Psychiatric/Behavioral: The patient is nervous/anxious (2/2 to rash )  Active Problem List     Patient Active Problem List   Diagnosis    Diabetes mellitus ([facility] )    Pulmonary embolism ([place] [address] )    [facility]) ([facility])    Atrial fibrillation ([facility])    Bipolar disorder ([facility] )    Late onset [facility]'s disease with [facility] disturbance [facility] )    Hyperthyroidism    Debility    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, of the knee    Other constipation    Neurogenic bladder    Other insomnia    Ileus ([facility]    Dermatitis    Rash and other nonspecific skin eruption       Objective       Physical Exam   Constitutional: No distress  Appearing chronically ill    Pulmonary/Chest: No respiratory distress  Neurological: She is alert  Skin: Skin is warm and dry  Capillary refill takes less than 2 seconds  Rash (chest and L UE ) noted  She is not diaphoretic  There is erythema  No pallor  + large erythematous and scaly plaque on left UE with scattered areas of similar lesions on chest  + lichenification of upper extremities        Psychiatric:   Cooperative during exam    Nursing note and vitals reviewed  Pertinent Laboratory/Diagnostic Studies:  Reviewed     Current Medications       Medications reviewed and updated, see facility [facility] for details         April Kehr, CRNP   [facility]  2/4/2020 7:16 PM

## 2020-02-05 NOTE — ASSESSMENT & PLAN NOTE
+ large erythematous and scaly plaque on left UE with scattered areas of similar lesions on chest   + lichenification of upper extremities secondary to excessive scratching   -only c/o of constant moderate pruritis   -no recent medication changes/environmental exposures   -treated for minimal pruritis and dermatitis last month with hydrocortisone cream (partial relief)  -now s/sx recurrent and worsened   -start clobetasol 0 05% BID x 2 weeks   -resident also requesting dermatology consult for further evaluation and recommendation  -continue to monitor for improvement and or worsening in condition   -discussed with nursing

## 2020-02-14 ENCOUNTER — NURSING HOME VISIT (OUTPATIENT)
Dept: GERIATRICS | Facility: OTHER | Age: 80
End: 2020-02-14
Payer: MEDICARE

## 2020-02-14 ENCOUNTER — TELEPHONE (OUTPATIENT)
Dept: OTHER | Facility: OTHER | Age: 80
End: 2020-02-14

## 2020-02-14 DIAGNOSIS — R11.2 NAUSEA AND VOMITING, INTRACTABILITY OF VOMITING NOT SPECIFIED, UNSPECIFIED VOMITING TYPE: Primary | ICD-10-CM

## 2020-02-14 PROCEDURE — 99308 SBSQ NF CARE LOW MDM 20: CPT | Performed by: NURSE PRACTITIONER

## 2020-02-14 NOTE — PROGRESS NOTES
[address] [facility]  [address]  [phone]) [address] [place] [address]  Code  32      NAME: Ann-Marie Crowder  AGE: 78 y o  SEX: female    : 1940    Code Status: AND/DNR/DNH/comfort measures     DATE OF ENCOUNTER: 2020    Assessment and Plan     Problem List Items Addressed This Visit        Digestive    Nausea and vomiting - Primary     -2 episodes this afternoon accompanied by temperature of 100 5 and  bpm   -denies respiratory s/sx and lungs CTA on exam   -concern for urinary infection with suprapubic catheter and abnormal s/sx    -ordered CMP, CBC w/ diff and UA/urine cx   -administer Zofran 4 mg q 12 hours PRN x 3 days   -Tylenol PRN for fever   -encourage fluids q shift   -clears until able to tolerate solids   -start VS q shift x 3 days   -continue to monitor for improvement and or worsening in condition   -notify MD/NP with any decline in condition   -DWN and resident                No orders of the defined types were placed in this encounter  Chief Complaint     Nausea and vomiting     History of Present Illness     57-year-old female, resident of [facility], seen in collaboration with nursing to evaluate change in condition  Notified by nursing of 2 episodes of nausea and vomiting accompanied by temperature of 100 5°  No other concerns from nursing  On exam, resident resting in bed appearing in mild distress secondary to intermittent nausea  She reports feeling nauseated since yesterday but without vomiting  Today, however she reports vomiting twice  She denies change in diet, aspiration, chills, shortness of breath, cough, chest pain, abdominal pain, constipation or diarrhea   Of note, suprapubic cathter in place secondary to her chronic conditon of neurogenic bladder  Reviewed POLST form with nursing outlining resident's wishes including DNR, DNH, comfort measures, limited antibiotics and trial period of artificial hydration  The following portions of the patient's history were reviewed and updated as appropriate: allergies, current medications, past medical history and problem list     Review of Systems     Review of Systems   Constitutional: Positive for activity change (2/2 to chronic medical conditions), appetite change (decreased 2/2 to N/V) and fever  Negative for chills and diaphoresis  HENT: Negative for congestion  Respiratory: Negative for chest tightness, shortness of breath and wheezing  Cardiovascular: Negative for chest pain and palpitations  Gastrointestinal: Positive for abdominal distention (baseline ), nausea and vomiting  Negative for abdominal pain, constipation and diarrhea  Genitourinary: Negative for flank pain and pelvic pain  Musculoskeletal: Positive for arthralgias  Neurological: Positive for weakness (2/2 to chronic medical conditions )  Negative for dizziness and headaches  Active Problem List     Patient Active Problem List   Diagnosis    Diabetes mellitus ([facility] )    Pulmonary embolism ([facility] )    [facility]) ([facility])    Atrial fibrillation ([facility])    Bipolar disorder ([facility] )    Late onset Alzheimer's disease with behavioral disturbance ([facility] )    Hyperthyroidism    Debility    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, of the knee    Other constipation    Neurogenic bladder    Other insomnia    Ileus ([facility])    Dermatitis    Rash and other nonspecific skin eruption    Nausea and vomiting       Objective     /90, , T 100 5, RR 18, 98% RA ,  (prelunch)    Physical Exam   Constitutional: No distress  Appearing chronically ill    Cardiovascular: Normal rate  Tachycardia @ 104 bpm; irregular rhythm (chronic condition of afib)   Pulmonary/Chest: Effort normal and breath sounds normal  No respiratory distress  She has no wheezes  She has no rales  Abdominal: Soft   Bowel sounds are normal  She exhibits distension (baseline )  There is no tenderness  There is no guarding  Genitourinary:   Genitourinary Comments: Suprapubic catheter draining driss urine with scant amount of sentiment in drainage tube    Neurological: She is alert  Skin: Skin is warm and dry  She is not diaphoretic  No erythema  No pallor  Nursing note and vitals reviewed  Pertinent Laboratory/Diagnostic Studies:  Reviewed     Current Medications       Medications reviewed and updated, see facility [facility] for details         Callum Rene, [address]   [facility]  2/14/2020 3:25 PM

## 2020-02-14 NOTE — ASSESSMENT & PLAN NOTE
-2 episodes this afternoon accompanied by temperature of 100 5 and  bpm   -denies respiratory s/sx and lungs CTA on exam   -concern for urinary infection with suprapubic catheter and abnormal s/sx    -ordered CMP, CBC w/ diff and UA/urine cx   -administer Zofran 4 mg q 12 hours PRN x 3 days   -Tylenol PRN for fever   -encourage fluids q shift   -clears until able to tolerate solids   -start VS q shift x 3 days   -continue to monitor for improvement and or worsening in condition   -notify MD/NP with any decline in condition   -DWN and resident

## 2020-02-15 ENCOUNTER — TELEPHONE (OUTPATIENT)
Dept: OTHER | Facility: OTHER | Age: 80
End: 2020-02-15

## 2020-02-15 NOTE — TELEPHONE ENCOUNTER
Marta from [facility] [place]  ([phone] station [phone]) needs to report STAT lab results to the on call provider  Dr Gerard Tucker was sent a TT page  Timm Dance is aware if she does not get a response in 20-30 minutes to call back

## 2020-02-15 NOTE — TELEPHONE ENCOUNTER
Paged Dr Jed Rai via [facility] @ 22:52 re: STAT labs/UACS - 4+ bacteria, positive for nitrates, temp of 104

## 2020-02-17 ENCOUNTER — NURSING HOME VISIT (OUTPATIENT)
Dept: GERIATRICS | Facility: OTHER | Age: 80
End: 2020-02-17
Payer: MEDICARE

## 2020-02-17 DIAGNOSIS — N30.00 ACUTE CYSTITIS WITHOUT HEMATURIA: Primary | ICD-10-CM

## 2020-02-17 PROCEDURE — 99308 SBSQ NF CARE LOW MDM 20: CPT | Performed by: NURSE PRACTITIONER

## 2020-02-17 NOTE — PROGRESS NOTES
[address] [facility]  [address]  [phone] [address] [place] [address]  Code 32      NAME: Sosa Rosenthal  AGE: 78 y o  SEX: female    : 1940    Code Status: AND/DNR    DATE OF ENCOUNTER: 2020    Assessment and Plan     Problem List Items Addressed This Visit        Genitourinary    Acute cystitis without hematuria - Primary     -urinalysis positive for nitrates, white blood cells and bacteria  -per on call provider, resident received Rocephin 1 g IM x 1 dose with SQ NSS fluids @ 70 ml/hr x 48 hours   -urine culture resulted today--> positive for E  coli and Enterococcus facecalis   -sensitivities reviewed; ordered nitrofurantoin 100 mg twice a day x 5 days  -VSS and N/V resolved   -resident able to tolerate PO and will therefore discontinue SQ fluids   -BMP results hemolyzed; repeating BMP 20  -continue monitor for improvement and or worsening condition  -DWN               No orders of the defined types were placed in this encounter  Chief Complaint     Follow-up on urine studies and current condition     History of Present Illness     78year-old female, resident of [facility], seen in collaboration with nursing to follow-up on change in condition including fever, nausea and vomiting on 2020 (see note for encounter on )  Labs and urine studies were ordered for infectious/metabolic work up  Over the weekend, on-call provider ordered 1 dose of Rocephin based on urinalysis as well as SQ hydration for fever and decreased PO intake  Per nursing, resident has improved with stable VS and no episodes of N/V  Upon exam, resident resting comfortably in bed with no acute symptoms of distress or discomfort  She denies fever, chills, decreased appetite, shortness of breath, cough, chest pain, N/V/D or constipation        The following portions of the patient's history were reviewed and updated as appropriate: allergies, current medications, past medical history and problem list     Review of Systems     Review of Systems   Constitutional: Positive for activity change (2/2 to chronic medical conditions )  Negative for appetite change, chills, diaphoresis and fever  Respiratory: Negative for cough, choking and shortness of breath  Cardiovascular: Negative for chest pain and palpitations  Gastrointestinal: Positive for abdominal distention (Chronic)  Negative for abdominal pain, constipation, diarrhea, nausea and vomiting  Genitourinary: Negative for flank pain and pelvic pain  Psychiatric/Behavioral: The patient is not nervous/anxious  Active Problem List     Patient Active Problem List   Diagnosis    [facility]    Pulmonary embolism ([facility] )    [facility] venous [facility]) ([facility])    Atrial fibrillation ([facility])    Bipolar disorder ([facility] )    Late onset Alzheimer's disease with behavioral disturbance ([facility] )    Hyperthyroidism    Debility    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, of the knee    Other constipation    Neurogenic bladder    Other insomnia    Ileus ([facility] )    Dermatitis    Rash and other nonspecific skin eruption    Nausea and vomiting    Acute cystitis without hematuria       Objective     /60, T 98 8, HR 70, RR 20, 97% RA     Physical Exam   Constitutional: No distress  Appearing chronically ill   Pulmonary/Chest: No respiratory distress  Abdominal: Soft  Bowel sounds are normal  She exhibits distension (Baseline)  There is no tenderness  There is no guarding  Genitourinary:   Genitourinary Comments: IUC draining clear yellow urine    Neurological: She is alert  Skin: Skin is warm and dry  Capillary refill takes less than 2 seconds  She is not diaphoretic  No erythema  No pallor  Psychiatric:   Pleasant and cooperative   Nursing note and vitals reviewed        Pertinent Laboratory/Diagnostic Studies:  Reviewed CBC, BMP, urinalysis and urine culture; BMP partially hemolyzed--> ordered repeat BMP 2/18/20    Current Medications       Medications reviewed and updated, see facility [facility] for details         Berkley Malik   [facility]  2/17/2020 8:23 PM

## 2020-02-18 NOTE — ASSESSMENT & PLAN NOTE
-urinalysis positive for nitrates, white blood cells and bacteria  -per on call provider, resident received Rocephin 1 g IM x 1 dose with SQ NSS fluids @ 70 ml/hr x 48 hours   -urine culture resulted today--> positive for E  coli and Enterococcus facecalis   -sensitivities reviewed; ordered nitrofurantoin 100 mg twice a day x 5 days  -VSS and N/V resolved   -resident able to tolerate PO and will therefore discontinue SQ fluids   -BMP results hemolyzed; repeating BMP 2/18/20  -continue monitor for improvement and or worsening condition  -KALI

## 2020-02-27 ENCOUNTER — NURSING HOME VISIT (OUTPATIENT)
Dept: GERIATRICS | Facility: OTHER | Age: 80
End: 2020-02-27
Payer: MEDICARE

## 2020-02-27 DIAGNOSIS — G47.09 OTHER INSOMNIA: ICD-10-CM

## 2020-02-27 DIAGNOSIS — G30.1 LATE ONSET ALZHEIMER'S DISEASE WITH BEHAVIORAL DISTURBANCE (HCC): Chronic | ICD-10-CM

## 2020-02-27 DIAGNOSIS — E11.9 TYPE 2 DIABETES MELLITUS WITHOUT COMPLICATION, WITH LONG-TERM CURRENT USE OF INSULIN (HCC): Chronic | ICD-10-CM

## 2020-02-27 DIAGNOSIS — I27.82 CHRONIC PULMONARY EMBOLISM WITHOUT ACUTE COR PULMONALE, UNSPECIFIED PULMONARY EMBOLISM TYPE (HCC): ICD-10-CM

## 2020-02-27 DIAGNOSIS — N31.9 NEUROGENIC BLADDER: ICD-10-CM

## 2020-02-27 DIAGNOSIS — R53.81 DEBILITY: ICD-10-CM

## 2020-02-27 DIAGNOSIS — F31.30 BIPOLAR AFFECTIVE DISORDER, CURRENT EPISODE DEPRESSED, CURRENT EPISODE SEVERITY UNSPECIFIED (HCC): Chronic | ICD-10-CM

## 2020-02-27 DIAGNOSIS — R21 RASH AND OTHER NONSPECIFIC SKIN ERUPTION: ICD-10-CM

## 2020-02-27 DIAGNOSIS — E05.90 HYPERTHYROIDISM: ICD-10-CM

## 2020-02-27 DIAGNOSIS — F02.81 LATE ONSET ALZHEIMER'S DISEASE WITH BEHAVIORAL DISTURBANCE (HCC): Chronic | ICD-10-CM

## 2020-02-27 DIAGNOSIS — Z79.4 TYPE 2 DIABETES MELLITUS WITHOUT COMPLICATION, WITH LONG-TERM CURRENT USE OF INSULIN (HCC): Chronic | ICD-10-CM

## 2020-02-27 DIAGNOSIS — I48.20 CHRONIC ATRIAL FIBRILLATION (HCC): Primary | Chronic | ICD-10-CM

## 2020-02-27 DIAGNOSIS — K59.09 OTHER CONSTIPATION: ICD-10-CM

## 2020-02-27 PROCEDURE — 99309 SBSQ NF CARE MODERATE MDM 30: CPT | Performed by: STUDENT IN AN ORGANIZED HEALTH CARE EDUCATION/TRAINING PROGRAM

## 2020-03-02 NOTE — ASSESSMENT & PLAN NOTE
Accu-Cheks remain stable for the most part  Recommend adherence to a diabetic, heart healthy diet  Would avoid very tight control of patient's blood sugars given an increased risk of hypoglycemia in this frail patient  Continue NovoLog 12 units b i d    Continue Lantus 45 units subcu HS  Continue Accu-Chek monitoring  Will continue to monitor

## 2020-03-02 NOTE — ASSESSMENT & PLAN NOTE
Some improvement in upper extremity rash  Previously treated with clobetasol  Decreased pruritus  Patient is scheduled for consultation with Dermatology soon  Will continue to monitor

## 2020-03-02 NOTE — ASSESSMENT & PLAN NOTE
Patient denies any cardiorespiratory distress  Vitals remained stable  Continue Xarelto 20 mg daily  Will continue to follow

## 2020-03-02 NOTE — ASSESSMENT & PLAN NOTE
Suprapubic catheter in situ  No signs of an acute infective process  Continue routine maintenance care of catheter  Follow up with Urology as needed

## 2020-03-02 NOTE — ASSESSMENT & PLAN NOTE
Patient denies any cardiorespiratory distress  Heart rate remains controlled  Continue Xarelto 20 mg daily  Continue digoxin 0 125 mg daily  Recommend adherence to a heart healthy diet  Follow-up with Cardiology as needed  Will monitor digoxin levels periodically  Will continue to follow

## 2020-03-02 NOTE — ASSESSMENT & PLAN NOTE
Recommend reorientation and redirection as needed  Manage chronic conditions  Maintain Falls precautions  Encourage patient remain active mentally, physically and socially  Patient should participate in cognitively challenging exercises  Will continue to follow

## 2020-03-02 NOTE — ASSESSMENT & PLAN NOTE
Symptoms remain stable  Continue routine follow-up with Psychiatry  Continue duloxetine 30 mg p o   Daily  Continue mirtazapine 7 5 mg p o  HS  Continue melatonin 6 mg PO HS  Encourage patient remain active mentally, physically and socially  Will continue to follow

## 2020-03-02 NOTE — ASSESSMENT & PLAN NOTE
Debility secondary to chronic comorbidities  Patient continues to require 24/7 supervision and assistance with ADLs during her long-term care  Manage chronic conditions  Maintain [place] precautions  Encourage patient remain active mentally, physically and socially  Continue nutritional support, supportive care  Will continue to follow

## 2020-03-02 NOTE — ASSESSMENT & PLAN NOTE
Stable  Previously followed by endocrinology  Continue of methimazole 2 5 mg every 2 days  Will continue to monitor periodically

## 2020-03-02 NOTE — ASSESSMENT & PLAN NOTE
Improved  Continue senna 8 6 mg, 2 tablets HS  Continue nursing home bowel regimen as needed  Encourage increased fiber intake  Physical activity as able  Will continue to follow

## 2020-03-02 NOTE — PROGRESS NOTES
[address]  [phone]  [facility] Progress Note  Billing code: 28        NAME: Vj Mcgowan  AGE: 78 y o  SEX: female    DATE OF ENCOUNTER:  02/27/2020    Assessment and Plan     Problem List Items Addressed This Visit        Endocrine    Diabetes mellitus ([facility] ) (Chronic)       Accu-Cheks remain stable for the most part  Recommend adherence to a diabetic, heart healthy diet  Would avoid very tight control of patient's blood sugars given an increased risk of hypoglycemia in this frail patient  Continue NovoLog 12 units b i d    Continue Lantus 45 units subcu HS  Continue Accu-Chek monitoring  Will continue to monitor         Hyperthyroidism     Stable  Previously followed by endocrinology  Continue of methimazole 2 5 mg every 2 days  Will continue to monitor periodically            Cardiovascular and Mediastinum    Atrial fibrillation (HCC) - Primary (Chronic)     Patient denies any cardiorespiratory distress  Heart rate remains controlled  Continue Xarelto 20 mg daily  Continue digoxin 0 125 mg daily  Recommend adherence to a heart healthy diet  Follow-up with Cardiology as needed  Will monitor digoxin levels periodically  Will continue to follow         Chronic pulmonary embolism without acute cor pulmonale (HCC)     Patient denies any cardiorespiratory distress  Vitals remained stable  Continue Xarelto 20 mg daily  Will continue to follow            Nervous and Auditory    Late onset Alzheimer's disease with behavioral disturbance ([facility] ) (Chronic)     Recommend reorientation and redirection as needed  Manage chronic conditions  Maintain [place] precautions  Encourage patient remain active mentally, physically and socially  Patient should participate in cognitively challenging exercises  Will continue to follow            Musculoskeletal and Integument    Rash and other nonspecific skin eruption     Some improvement in upper extremity rash  Previously treated with clobetasol  Decreased pruritus  Patient is scheduled for consultation with Dermatology soon  Will continue to monitor            Other    Bipolar disorder ([facility] ) (Chronic)     Symptoms remain stable  Continue routine follow-up with Psychiatry  Continue duloxetine 30 mg p o  Daily  Continue mirtazapine 7 5 mg p o  HS  Continue melatonin 6 mg PO HS  Encourage patient remain active mentally, physically and socially  Will continue to follow         Alfredo Vizcaino secondary to chronic comorbidities  Patient continues to require 24/7 supervision and assistance with ADLs during her long-term care  Manage chronic conditions  Maintain [place] precautions  Encourage patient remain active mentally, physically and socially  Continue nutritional support, supportive care  Will continue to follow         Other constipation     Improved  Continue senna 8 6 mg, 2 tablets HS  Continue nursing home bowel regimen as needed  Encourage increased fiber intake  Physical activity as able  Will continue to follow         Neurogenic bladder     Suprapubic catheter in situ  No signs of an acute infective process  Continue routine maintenance care of catheter  Follow up with Urology as needed         Other insomnia     Improved  Continue mirtazapine 7 5 mg p o  HS  Continue melatonin 6 mg p o  HS  Avoid daytime napping  Avoid caffeinated beverages after 14:00  Will continue to follow               - Counseling Documentation: patient was counseled regarding: diagnostic results, instructions for management, risk factor reductions, prognosis, patient and family education, impressions, risks and benefits of treatment options and importance of compliance with treatment  Discussed with patient and nursing staff    Chief Complaint     Patient seen for routine monthly long-term care nursing home rounds for follow-up of acute and chronic conditions      History of Present Illness     HPI  Patient seen and examined at bedside for routine monthly long-term care nursing home rounds for follow-up of acute and chronic conditions  Patient was very calm, pleasant and cooperative during the interview  She states that she continues to have occasional nausea however denies any acute symptoms today  She does states that she continues to have a persisting pruritic rash on her extremities for which she is scheduled to see Dermatology soon  She  has been tolerating oral intake well, sleeping and has been having regular bowel movements  The patient continues to do well on Xarelto and digoxin for a history of atrial fibrillation with heart rate remaining controlled  She has also been compliant with methimazole for a history of hyperthyroidism  The patient continues on senna for a history of constipation with regular bowel movements on current regimen  No acute concerns expressed by patient or nursing staff today  The following portions of the patient's history were reviewed and updated as appropriate: allergies, current medications, past family history, past medical history, past social history, past surgical history and problem list     Review of Systems     Review of Systems   Constitutional: Positive for activity change (Chronic), appetite change (Chronic) and fatigue (Chronic)  Negative for chills and fever  HENT: Negative for congestion, ear pain, rhinorrhea, sore throat and trouble swallowing  Eyes: Negative for discharge  Respiratory: Negative for cough, chest tightness, shortness of breath, wheezing and stridor  Cardiovascular: Positive for leg swelling  Negative for chest pain and palpitations  Gastrointestinal: Positive for abdominal distention (Chronic), constipation and nausea (Intermittent)  Negative for abdominal pain, diarrhea and vomiting  Genitourinary: Negative for decreased urine volume, dysuria and hematuria     Musculoskeletal: Positive for arthralgias (Chronic), back pain (Chronic) and gait problem (Uses wheelchair)  Skin: Positive for rash (Over extremities)  Negative for color change, pallor and wound  Neurological: Positive for weakness (Chronic)  Negative for dizziness, seizures, light-headedness and headaches  Psychiatric/Behavioral: Positive for decreased concentration and sleep disturbance (History of insomnia)  Negative for agitation, behavioral problems and confusion  The patient is not nervous/anxious  Active Problem List     Patient Active Problem List   Diagnosis    Diabetes mellitus ([facility] )    Chronic pulmonary embolism without acute cor pulmonale (HCC)    D[facility] thrombosis) [facility]    Atrial fibrillation ([facility])    B[facility] )    Late onset Alzheimer's disease with behavioral disturbance ([facility] )    Hyperthyroidism    Debility    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, of the knee    Other constipation    Neurogenic bladder    Other insomnia    Ileus ([facility] )    Dermatitis    Rash and other nonspecific skin eruption    Nausea and vomiting    Acute cystitis without hematuria       Objective     /71, temp 98°, HR 80, RR 18, O2 sat 99%, weight 182 lbs, blood sugar 80 mg    Physical Exam   Constitutional: No distress  Elderly female lying in bed in no obvious cardiorespiratory or painful distress   HENT:   Head: Normocephalic and atraumatic  Right Ear: External ear normal    Left Ear: External ear normal    Nose: Nose normal    Mouth/Throat: Oropharynx is clear and moist    Eyes: Conjunctivae are normal  Right eye exhibits no discharge  Left eye exhibits no discharge  No scleral icterus  Neck: Normal range of motion  Neck supple  No JVD present  Cardiovascular: Normal rate, regular rhythm and intact distal pulses  Exam reveals no gallop and no friction rub  Murmur (ESM 2/6) heard  Pulmonary/Chest: Effort normal  No stridor  No respiratory distress  She has no wheezes  She has no rales     Air entry good bilaterally, harsh breath sounds throughout   Abdominal: Soft  Bowel sounds are normal  She exhibits distension  There is no tenderness  There is no rebound and no guarding  Unable to appreciate any masses due to abdominal distention   Genitourinary:   Genitourinary Comments: Suprapubic catheter draining clear yellow urine   Musculoskeletal: She exhibits edema (Minimal nonpitting lower extremity)  She exhibits no tenderness or deformity  Neurological: She is alert  She has normal reflexes  No cranial nerve deficit  Patient oriented x2  Follows commands readily   Skin: Skin is warm  No rash noted  She is not diaphoretic  No erythema  No pallor  Psychiatric: She has a normal mood and affect  Nursing note and vitals reviewed  Pertinent Laboratory/Diagnostic Studies:  CBC:  WBC 6 9, HB 10 8, HCT 32,   BMP (02/10/2020):  Glucose 106, BUN 28, creatinine 1 25, , K 3 7, chloride 107, calcium 8 2, GFR 40    Current Medications   Medications were reviewed and updated in facility paper chart      Madisyn Vale MD  Geriatric Medicine  3/1/2020 10:22 PM

## 2020-03-02 NOTE — ASSESSMENT & PLAN NOTE
Improved  Continue mirtazapine 7 5 mg p o  HS  Continue melatonin 6 mg p o   HS  Avoid daytime napping  Avoid caffeinated beverages after 14:00  Will continue to follow

## 2020-04-29 ENCOUNTER — NURSING HOME VISIT (OUTPATIENT)
Dept: GERIATRICS | Facility: OTHER | Age: 80
End: 2020-04-29
Payer: MEDICARE

## 2020-04-29 DIAGNOSIS — R53.81 DEBILITY: ICD-10-CM

## 2020-04-29 DIAGNOSIS — G30.1 LATE ONSET ALZHEIMER'S DISEASE WITH BEHAVIORAL DISTURBANCE (HCC): Chronic | ICD-10-CM

## 2020-04-29 DIAGNOSIS — K59.09 OTHER CONSTIPATION: ICD-10-CM

## 2020-04-29 DIAGNOSIS — I27.82 CHRONIC PULMONARY EMBOLISM WITHOUT ACUTE COR PULMONALE, UNSPECIFIED PULMONARY EMBOLISM TYPE (HCC): ICD-10-CM

## 2020-04-29 DIAGNOSIS — I48.20 CHRONIC ATRIAL FIBRILLATION (HCC): Primary | Chronic | ICD-10-CM

## 2020-04-29 DIAGNOSIS — E11.9 TYPE 2 DIABETES MELLITUS WITHOUT COMPLICATION, WITH LONG-TERM CURRENT USE OF INSULIN (HCC): Chronic | ICD-10-CM

## 2020-04-29 DIAGNOSIS — E05.90 HYPERTHYROIDISM: ICD-10-CM

## 2020-04-29 DIAGNOSIS — Z79.4 TYPE 2 DIABETES MELLITUS WITHOUT COMPLICATION, WITH LONG-TERM CURRENT USE OF INSULIN (HCC): Chronic | ICD-10-CM

## 2020-04-29 DIAGNOSIS — N31.9 NEUROGENIC BLADDER: ICD-10-CM

## 2020-04-29 DIAGNOSIS — F31.30 BIPOLAR AFFECTIVE DISORDER, CURRENT EPISODE DEPRESSED, CURRENT EPISODE SEVERITY UNSPECIFIED (HCC): Chronic | ICD-10-CM

## 2020-04-29 DIAGNOSIS — F02.81 LATE ONSET ALZHEIMER'S DISEASE WITH BEHAVIORAL DISTURBANCE (HCC): Chronic | ICD-10-CM

## 2020-04-29 DIAGNOSIS — G47.09 OTHER INSOMNIA: ICD-10-CM

## 2020-04-29 PROCEDURE — 99309 SBSQ NF CARE MODERATE MDM 30: CPT | Performed by: STUDENT IN AN ORGANIZED HEALTH CARE EDUCATION/TRAINING PROGRAM

## 2020-05-20 ENCOUNTER — NURSING HOME VISIT (OUTPATIENT)
Dept: GERIATRICS | Facility: OTHER | Age: 80
End: 2020-05-20
Payer: MEDICARE

## 2020-05-20 DIAGNOSIS — H00.033 EYELID CELLULITIS, RIGHT: Primary | ICD-10-CM

## 2020-05-20 PROCEDURE — 99308 SBSQ NF CARE LOW MDM 20: CPT | Performed by: NURSE PRACTITIONER

## 2020-05-21 PROBLEM — L03.213 PERIORBITAL CELLULITIS OF RIGHT EYE: Status: ACTIVE | Noted: 2020-05-21

## 2020-05-21 PROBLEM — H00.033 EYELID CELLULITIS, RIGHT: Status: ACTIVE | Noted: 2020-05-21

## 2020-06-23 ENCOUNTER — NURSING HOME VISIT (OUTPATIENT)
Dept: GERIATRICS | Facility: OTHER | Age: 80
End: 2020-06-23
Payer: MEDICARE

## 2020-06-23 DIAGNOSIS — G89.29 OTHER CHRONIC PAIN: ICD-10-CM

## 2020-06-23 DIAGNOSIS — F33.9 RECURRENT MAJOR DEPRESSIVE DISORDER, REMISSION STATUS UNSPECIFIED (HCC): ICD-10-CM

## 2020-06-23 DIAGNOSIS — N31.9 NEUROGENIC BLADDER: ICD-10-CM

## 2020-06-23 DIAGNOSIS — E05.90 HYPERTHYROIDISM: ICD-10-CM

## 2020-06-23 DIAGNOSIS — Z79.4 TYPE 2 DIABETES MELLITUS WITHOUT COMPLICATION, WITH LONG-TERM CURRENT USE OF INSULIN (HCC): Chronic | ICD-10-CM

## 2020-06-23 DIAGNOSIS — E11.9 TYPE 2 DIABETES MELLITUS WITHOUT COMPLICATION, WITH LONG-TERM CURRENT USE OF INSULIN (HCC): Chronic | ICD-10-CM

## 2020-06-23 DIAGNOSIS — R21 RASH AND OTHER NONSPECIFIC SKIN ERUPTION: ICD-10-CM

## 2020-06-23 DIAGNOSIS — K59.09 OTHER CONSTIPATION: ICD-10-CM

## 2020-06-23 DIAGNOSIS — I48.20 CHRONIC ATRIAL FIBRILLATION (HCC): Chronic | ICD-10-CM

## 2020-06-23 DIAGNOSIS — G30.1 LATE ONSET ALZHEIMER'S DISEASE WITH BEHAVIORAL DISTURBANCE (HCC): Primary | Chronic | ICD-10-CM

## 2020-06-23 DIAGNOSIS — I27.82 CHRONIC PULMONARY EMBOLISM WITHOUT ACUTE COR PULMONALE, UNSPECIFIED PULMONARY EMBOLISM TYPE (HCC): ICD-10-CM

## 2020-06-23 DIAGNOSIS — R53.81 DEBILITY: ICD-10-CM

## 2020-06-23 DIAGNOSIS — G47.09 OTHER INSOMNIA: ICD-10-CM

## 2020-06-23 DIAGNOSIS — F02.81 LATE ONSET ALZHEIMER'S DISEASE WITH BEHAVIORAL DISTURBANCE (HCC): Primary | Chronic | ICD-10-CM

## 2020-06-23 PROCEDURE — 99309 SBSQ NF CARE MODERATE MDM 30: CPT | Performed by: NURSE PRACTITIONER

## 2020-06-25 PROBLEM — G89.29 OTHER CHRONIC PAIN: Status: ACTIVE | Noted: 2020-06-25

## 2020-06-25 PROBLEM — F32.9 MDD (MAJOR DEPRESSIVE DISORDER): Status: ACTIVE | Noted: 2020-06-25

## 2020-07-20 ENCOUNTER — NURSING HOME VISIT (OUTPATIENT)
Dept: GERIATRICS | Facility: OTHER | Age: 80
End: 2020-07-20
Payer: MEDICARE

## 2020-07-20 DIAGNOSIS — G62.9 NEUROPATHY: Primary | ICD-10-CM

## 2020-07-20 PROCEDURE — 99308 SBSQ NF CARE LOW MDM 20: CPT | Performed by: NURSE PRACTITIONER

## 2020-07-20 NOTE — PROGRESS NOTES
[facility]  [address]  [phone]) [address]  Code 32      NAME: Luma Sanchez  AGE: 78 y o  SEX: female    : 1940    Code Status: AND/DNR/DNH    DATE OF ENCOUNTER: 2020    Assessment and Plan     Problem List Items Addressed This Visit        Nervous and Auditory    Neuropathy - Primary     -resident c/o increased B/L LE numbing/tingling discomfort, R>L  -no recent trauma; no edema or neurovascular compromise noted on exam   -will increase gabapentin at HS from 100 to 300 mg and continue 100 mg at 0600 and 1400   -start Tylenol 650 mg q 8 hour x 7 days; if effective will continue scheduled dose   -continue to monitor for improvement and or worsening in condition   -will follow up accordingly   -discussed with nursing and resident                No orders of the defined types were placed in this encounter  Chief Complaint     Leg pain     History of Present Illness     78-year-old female, resident of [facility], seen in collaboration with nursing to evaluate change in condition  Notified by nursing this morning of resident's complaint of increased lower extremity pain in relation to her chronic pain and neuropathy  No other concerns from nursing  Upon exam, resident mildly distressed, asking for additional medication to help relieve her pain  She reports chronic pain, but the discomfort has increased over the past couple of days  She complains of a shooting and tingling sensation down her legs, right leg pain more significant than the left  No aggravating or relieving factors identified as ROS limited secondary to Dementia  She otherwise reports feeling well and denies fever, chills, SOB, chest pain or N/V         The following portions of the patient's history were reviewed and updated as appropriate: allergies, current medications, past medical history and problem list     Review of Systems Review of Systems   Respiratory: Negative for shortness of breath  Cardiovascular: Negative for chest pain  Genitourinary: Negative for difficulty urinating  Musculoskeletal: Positive for arthralgias (chronic ) and gait problem (2/2 to chronic medical conditions )  Negative for joint swelling and neck pain  Neurological: Positive for weakness (2/2 to chronic medical conditions ) and numbness (+ tingling, B/L LE)  Negative for dizziness, speech difficulty and headaches  Psychiatric/Behavioral: Negative for sleep disturbance  The patient is nervous/anxious (r/t to acute pain )  Active Problem List     Patient Active Problem List   Diagnosis    Diabetes mellitus ([facility] )    Chronic pulmonary embol[facility] acute cor pulmonale [facility]    DVT [facility]deep venous thrombosis) [facility]    Atrial fibrillation ([facility]    Bipolar disorder ([facility] )    Late onset [facility]'s [facility] with [facility] )    Hyperthyroidism    Debility    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, of the knee    Other constipation    Neurogenic bladder    Other insomnia    Ileus ([facility] )    Dermatitis    Rash and other nonspecific skin eruption    Nausea and vomiting    Acute cystitis without hematuria    Eyelid cellulitis, right    MDD (major depressive disorder)    Other chronic pain    Neuropathy       Objective     VS: HR 78, RR 20, T 97 5     Physical Exam   Constitutional: No distress  Appearing chronically ill    HENT:   Head: Normocephalic and atraumatic  Eyes: Conjunctivae are normal  Right eye exhibits no discharge  Left eye exhibits no discharge  Cardiovascular: Normal rate, intact distal pulses and normal pulses  Pulmonary/Chest: Effort normal  No accessory muscle usage  No tachypnea  No respiratory distress  No cough or audible congestion    Musculoskeletal: She exhibits no edema, tenderness or deformity  Neurological: She is alert  Skin: Skin is warm and dry   Capillary refill takes less than 2 seconds  She is not diaphoretic  Psychiatric:   Cooperative    Nursing note and vitals reviewed  Pertinent Laboratory/Diagnostic Studies:  Reviewed     Current Medications       Medications reviewed and updated, see facility [facility] for details         MARIANA Lei   [facility]  7/24/2020 7:19 PM

## 2020-07-24 PROBLEM — G62.9 NEUROPATHY: Status: ACTIVE | Noted: 2020-07-24

## 2020-07-24 NOTE — ASSESSMENT & PLAN NOTE
-resident c/o increased B/L LE numbing/tingling discomfort, R>L  -no recent trauma; no edema or neurovascular compromise noted on exam   -will increase gabapentin at HS from 100 to 300 mg and continue 100 mg at 0600 and 1400   -start Tylenol 650 mg q 8 hour x 7 days; if effective will continue scheduled dose   -continue to monitor for improvement and or worsening in condition   -will follow up accordingly   -discussed with nursing and resident

## 2020-08-21 ENCOUNTER — NURSING HOME VISIT (OUTPATIENT)
Dept: GERIATRICS | Facility: OTHER | Age: 80
End: 2020-08-21
Payer: MEDICARE

## 2020-08-21 DIAGNOSIS — E05.90 HYPERTHYROIDISM: ICD-10-CM

## 2020-08-21 DIAGNOSIS — Z79.4 TYPE 2 DIABETES MELLITUS WITHOUT COMPLICATION, WITH LONG-TERM CURRENT USE OF INSULIN (HCC): Chronic | ICD-10-CM

## 2020-08-21 DIAGNOSIS — G30.1 LATE ONSET ALZHEIMER'S DISEASE WITH BEHAVIORAL DISTURBANCE (HCC): Chronic | ICD-10-CM

## 2020-08-21 DIAGNOSIS — N31.9 NEUROGENIC BLADDER: ICD-10-CM

## 2020-08-21 DIAGNOSIS — I48.20 CHRONIC ATRIAL FIBRILLATION (HCC): Primary | Chronic | ICD-10-CM

## 2020-08-21 DIAGNOSIS — E11.9 TYPE 2 DIABETES MELLITUS WITHOUT COMPLICATION, WITH LONG-TERM CURRENT USE OF INSULIN (HCC): Chronic | ICD-10-CM

## 2020-08-21 DIAGNOSIS — R53.81 DEBILITY: ICD-10-CM

## 2020-08-21 DIAGNOSIS — F02.81 LATE ONSET ALZHEIMER'S DISEASE WITH BEHAVIORAL DISTURBANCE (HCC): Chronic | ICD-10-CM

## 2020-08-21 DIAGNOSIS — Z71.89 GOALS OF CARE, COUNSELING/DISCUSSION: ICD-10-CM

## 2020-08-21 DIAGNOSIS — K59.00 CONSTIPATION, UNSPECIFIED CONSTIPATION TYPE: ICD-10-CM

## 2020-08-21 DIAGNOSIS — F31.30 BIPOLAR AFFECTIVE DISORDER, CURRENT EPISODE DEPRESSED, CURRENT EPISODE SEVERITY UNSPECIFIED (HCC): Chronic | ICD-10-CM

## 2020-08-21 PROCEDURE — 99309 SBSQ NF CARE MODERATE MDM 30: CPT | Performed by: INTERNAL MEDICINE

## 2020-08-22 PROBLEM — L30.9 DERMATITIS: Status: RESOLVED | Noted: 2020-01-10 | Resolved: 2020-08-22

## 2020-08-22 PROBLEM — R11.2 NAUSEA AND VOMITING: Status: RESOLVED | Noted: 2020-02-14 | Resolved: 2020-08-22

## 2020-08-22 PROBLEM — R21 RASH AND OTHER NONSPECIFIC SKIN ERUPTION: Status: RESOLVED | Noted: 2020-02-04 | Resolved: 2020-08-22

## 2020-08-22 PROBLEM — Z71.89 GOALS OF CARE, COUNSELING/DISCUSSION: Status: ACTIVE | Noted: 2020-08-22

## 2020-08-22 PROBLEM — H00.033 EYELID CELLULITIS, RIGHT: Status: RESOLVED | Noted: 2020-05-21 | Resolved: 2020-08-22

## 2020-08-22 PROBLEM — N30.00 ACUTE CYSTITIS WITHOUT HEMATURIA: Status: RESOLVED | Noted: 2020-02-17 | Resolved: 2020-08-22

## 2020-08-22 PROBLEM — K59.00 CONSTIPATION: Status: ACTIVE | Noted: 2019-08-07

## 2020-08-22 NOTE — ASSESSMENT & PLAN NOTE
-secondary to her chronic medical conditions  -she still requires 24/7 care/support of her ADLs  -I recommend continued care/support of her ADLs at long-term care facility level of care  -continue with monitoring

## 2020-08-22 NOTE — ASSESSMENT & PLAN NOTE
-she is clinically and chemically doing well with methimazole  -continue with clinical and periodic laboratory monitoring

## 2020-08-22 NOTE — ASSESSMENT & PLAN NOTE
-review of her fingerstick blood sugar log shows that she is doing well with Lantus insulin  -I will update her hemoglobin A1c level with target less than 8%  -continue with monitoring

## 2020-08-22 NOTE — PROGRESS NOTES
[address]  [address] [place] [address]  Facility: [facility]  [place] [address]        NAME: Janny Carpio  AGE: 78 y o  SEX: female    DATE OF ENCOUNTER: 8/21/2020    Code status:  DNR/DNH/[facility]    Assessment and Plan     1  Chronic atrial fibrillation (HCC)  Assessment & Plan:  -she is doing well with digoxin for rate control and Xarelto for anticoagulation  -continue with clinical and periodic laboratory monitoring      2  Bipolar affective disorder, current episode depressed, current episode severity unspecified [facility])  Assessment & Plan:  -she is following with the psychiatry service and has been doing well with duloxetine  -she had adverse reaction to antipsychotic medication in the past  -continue with monitoring      3  Late onset Alzheimer's disease with behavioral disturbance [facility])  Assessment & Plan:  -continue with care/support at long-term care facility  -continue with monitoring      4  Type 2 diabetes mellitus without complication, with long-term current use of insulin ([facility])  Assessment & Plan:    -review of her fingerstick blood sugar log shows that she is doing well with Lantus insulin  -I will update her hemoglobin A1c level with target less than 8%  -continue with monitoring      5  Hyperthyroidism  Assessment & Plan:  -she is clinically and chemically doing well with methimazole  -continue with clinical and periodic laboratory monitoring      6  Debility  Assessment & Plan:  -secondary to her chronic medical conditions  -she still requires 24/7 care/support of her ADLs  -I recommend continued care/support of her ADLs at long-term care facility level of care  -continue with monitoring      7  Neurogenic bladder  Assessment & Plan:  -she is doing well with suprapubic catheter  -continue with monitoring      8  Constipation, unspecified constipation type  Assessment & Plan:  -continue with senna and monitoring      9   Goals of care, counseling/discussion  Assessment & Plan:  -her current resuscitation status is DNR/DNH/comfort care        All medications and routine orders were reviewed and updated as needed  Plan discussed with: Nurse    Chief Complaint     She is seen for a follow-up visit with female nursing staff to update the care and treatment of her atrial fibrillation, insulin-requiring type 2 diabetes mellitus, hyperthyroidism, and debility secondary to her chronic medical conditions  History of Present Illness     She is an 78-year-old woman who is seen for a follow-up visit with female nursing staff to update the care and treatment of her atrial fibrillation-doing well with digoxin/Xarelto, insulin-requiring type 2 diabetes mellitus-doing well with Lantus insulin, hyperthyroidism-doing well with methimazole, and debility secondary to her chronic medical conditions-she still requires 24/7 care/support of her ADLs  When asked, she has no complaints or concerns during the visit  The following portions of the patient's history were reviewed and updated as appropriate: current medications, past family history, past medical history, past social history, past surgical history and problem list     Allergies: Allergies   Allergen Reactions    Risperdal [Risperidone]        Review of Systems     Review of Systems   Unable to perform ROS: Dementia       Medications and orders     All medications reviewed and updated in Nursing Home EMR  Objective     Vitals:  Weight 175 5 lb, temperature 98° F, pulse 76, respiratory rate 18, blood pressure 111/65, fasting blood sugar 112 (August 18, 2020), pain level required at 0  Physical Exam  Vitals signs reviewed  Exam conducted with a chaperone present  Constitutional:       General: She is awake  Appearance: Normal appearance  She is well-developed and well-groomed  She is obese  She is not toxic-appearing or diaphoretic        Comments: She appears comfortable lying in bed, her stated age, frail, and chronically ill  Eyes:      General: No scleral icterus  Conjunctiva/sclera: Conjunctivae normal    Cardiovascular:      Rate and Rhythm: Normal rate and regular rhythm  Heart sounds: Normal heart sounds  No murmur  No friction rub  No gallop  Pulmonary:      Effort: Pulmonary effort is normal  No respiratory distress  Breath sounds: Normal breath sounds  No stridor  No wheezing, rhonchi or rales  Abdominal:      General: There is distension  Palpations: Abdomen is soft  There is no mass  Tenderness: There is no abdominal tenderness  There is no guarding  Musculoskeletal:         General: No swelling  Neurological:      Mental Status: She is alert  Psychiatric:         Behavior: Behavior is cooperative  December 16, 2019:    Hemoglobin A1c:  8 6%    January 14, 2020:    Digoxin level:  0 4    March 18, 2020:    BMP:  Sodium 138, potassium 4 3, BUN 11, creatinine 1 06, fasting blood sugar 123, EGFR 49    CBC with diff:  WBC count 7 6, hemoglobin 12 12, hematocrit 36 6, platelet count [id number]      - Monthly orders reviewed and signed  Treatment plan reviewed with nursing staff      NANY Gutierrez   8/22/2020 1:52 PM

## 2020-08-22 NOTE — ASSESSMENT & PLAN NOTE
-she is following with the psychiatry service and has been doing well with duloxetine  -she had adverse reaction to antipsychotic medication in the past  -continue with monitoring

## 2020-08-22 NOTE — ASSESSMENT & PLAN NOTE
-she is doing well with digoxin for rate control and Xarelto for anticoagulation  -continue with clinical and periodic laboratory monitoring

## 2020-10-08 ENCOUNTER — NURSING HOME VISIT (OUTPATIENT)
Dept: GERIATRICS | Facility: OTHER | Age: 80
End: 2020-10-08
Payer: MEDICARE

## 2020-10-08 VITALS
TEMPERATURE: 99 F | HEART RATE: 101 BPM | DIASTOLIC BLOOD PRESSURE: 56 MMHG | RESPIRATION RATE: 22 BRPM | SYSTOLIC BLOOD PRESSURE: 97 MMHG

## 2020-10-08 DIAGNOSIS — R50.9 FEVER, UNSPECIFIED FEVER CAUSE: Primary | ICD-10-CM

## 2020-10-08 PROCEDURE — 99309 SBSQ NF CARE MODERATE MDM 30: CPT | Performed by: PHYSICIAN ASSISTANT

## 2020-10-20 ENCOUNTER — NURSING HOME VISIT (OUTPATIENT)
Dept: GERIATRICS | Facility: OTHER | Age: 80
End: 2020-10-20
Payer: MEDICARE

## 2020-10-20 DIAGNOSIS — E11.9 TYPE 2 DIABETES MELLITUS WITHOUT COMPLICATION, WITH LONG-TERM CURRENT USE OF INSULIN (HCC): Primary | Chronic | ICD-10-CM

## 2020-10-20 DIAGNOSIS — G89.29 OTHER CHRONIC PAIN: ICD-10-CM

## 2020-10-20 DIAGNOSIS — F02.81 LATE ONSET ALZHEIMER'S DISEASE WITH BEHAVIORAL DISTURBANCE (HCC): Chronic | ICD-10-CM

## 2020-10-20 DIAGNOSIS — E05.90 HYPERTHYROIDISM: ICD-10-CM

## 2020-10-20 DIAGNOSIS — F33.9 RECURRENT MAJOR DEPRESSIVE DISORDER, REMISSION STATUS UNSPECIFIED (HCC): ICD-10-CM

## 2020-10-20 DIAGNOSIS — I48.20 CHRONIC ATRIAL FIBRILLATION (HCC): Chronic | ICD-10-CM

## 2020-10-20 DIAGNOSIS — Z93.59 SUPRAPUBIC CATHETER (HCC): ICD-10-CM

## 2020-10-20 DIAGNOSIS — K56.7 ILEUS (HCC): ICD-10-CM

## 2020-10-20 DIAGNOSIS — G30.1 LATE ONSET ALZHEIMER'S DISEASE WITH BEHAVIORAL DISTURBANCE (HCC): Chronic | ICD-10-CM

## 2020-10-20 DIAGNOSIS — Z79.4 TYPE 2 DIABETES MELLITUS WITHOUT COMPLICATION, WITH LONG-TERM CURRENT USE OF INSULIN (HCC): Primary | Chronic | ICD-10-CM

## 2020-10-20 PROCEDURE — 99309 SBSQ NF CARE MODERATE MDM 30: CPT | Performed by: NURSE PRACTITIONER

## 2020-11-12 ENCOUNTER — NURSING HOME VISIT (OUTPATIENT)
Dept: GERIATRICS | Facility: OTHER | Age: 80
End: 2020-11-12
Payer: MEDICARE

## 2020-11-12 DIAGNOSIS — L29.9 PRURITUS: Primary | ICD-10-CM

## 2020-11-12 DIAGNOSIS — G62.9 NEUROPATHY: ICD-10-CM

## 2020-11-12 DIAGNOSIS — L85.3 XEROSIS CUTIS: ICD-10-CM

## 2020-11-12 PROCEDURE — 99309 SBSQ NF CARE MODERATE MDM 30: CPT | Performed by: NURSE PRACTITIONER

## 2020-12-14 ENCOUNTER — NURSING HOME VISIT (OUTPATIENT)
Dept: GERIATRICS | Facility: OTHER | Age: 80
End: 2020-12-14
Payer: MEDICARE

## 2020-12-14 DIAGNOSIS — N31.9 NEUROGENIC BLADDER: ICD-10-CM

## 2020-12-14 DIAGNOSIS — I48.20 CHRONIC ATRIAL FIBRILLATION (HCC): Chronic | ICD-10-CM

## 2020-12-14 DIAGNOSIS — E05.90 HYPERTHYROIDISM: ICD-10-CM

## 2020-12-14 DIAGNOSIS — E11.9 TYPE 2 DIABETES MELLITUS WITHOUT COMPLICATION, WITH LONG-TERM CURRENT USE OF INSULIN (HCC): Chronic | ICD-10-CM

## 2020-12-14 DIAGNOSIS — R53.81 DEBILITY: Primary | ICD-10-CM

## 2020-12-14 DIAGNOSIS — Z79.4 TYPE 2 DIABETES MELLITUS WITHOUT COMPLICATION, WITH LONG-TERM CURRENT USE OF INSULIN (HCC): Chronic | ICD-10-CM

## 2020-12-14 DIAGNOSIS — F31.30 BIPOLAR AFFECTIVE DISORDER, CURRENT EPISODE DEPRESSED, CURRENT EPISODE SEVERITY UNSPECIFIED (HCC): Chronic | ICD-10-CM

## 2020-12-14 PROCEDURE — 99309 SBSQ NF CARE MODERATE MDM 30: CPT | Performed by: INTERNAL MEDICINE

## 2020-12-20 PROBLEM — R50.9 FEVER: Status: RESOLVED | Noted: 2020-10-08 | Resolved: 2020-12-20

## 2021-01-04 ENCOUNTER — TELEPHONE (OUTPATIENT)
Dept: UROLOGY | Facility: MEDICAL CENTER | Age: 81
End: 2021-01-04

## 2021-01-04 NOTE — TELEPHONE ENCOUNTER
Spoke to Jannet Mehta and scheduled Patient for 01/06/2021 at 1 pm with Dr Mounika Rivera in [facility] as per their preference  Jannet Mehta reports will call if unable to scheduled transport  Repeats back appointment information and knows to call office with questions or concerns in the interim

## 2021-01-04 NOTE — TELEPHONE ENCOUNTER
This is a patient of Dr Mounika Rivera in [facility] called and the patient was supposed to get a cysto back in June  Please call [facility] to schedule cysto  I do not see any upcoming appointments available  They can be reached at [phone] ext [phone]

## 2021-01-06 ENCOUNTER — PROCEDURE VISIT (OUTPATIENT)
Dept: UROLOGY | Facility: AMBULATORY SURGERY CENTER | Age: 81
End: 2021-01-06
Payer: MEDICARE

## 2021-01-06 VITALS — DIASTOLIC BLOOD PRESSURE: 74 MMHG | HEART RATE: 79 BPM | SYSTOLIC BLOOD PRESSURE: 110 MMHG

## 2021-01-06 DIAGNOSIS — N31.9 NEUROGENIC BLADDER: Primary | ICD-10-CM

## 2021-01-06 PROCEDURE — 51705 CHANGE OF BLADDER TUBE: CPT | Performed by: UROLOGY

## 2021-01-06 PROCEDURE — 99213 OFFICE O/P EST LOW 20 MIN: CPT | Performed by: UROLOGY

## 2021-01-06 NOTE — PROGRESS NOTES
1/6/2021    Martha Booker  1940  [id number]        Assessment  Neurogenic bladder with chronic indwelling suprapubic tube      Discussion  A new suprapubic tube is placed in the office today without difficulty  Recommend that the tube be changed every 4-6 weeks in the nursing facility  I would hold on cystoscopy today and recommend cystoscopy in 1 year after obtaining informed consent from the patient's daughter  History of Present Illness  78 y o  female with a history of neurogenic bladder with a chronic indwelling suprapubic tube  Her last cystoscopy was performed in June 2019  She returns in follow-up today  There are no complaints or issues from either the patient or the aide who accompanies her from the nursing facility  I called the patient's daughter Shantal Fairbanks to discuss her mother today  I was unable to get through to her  The patient does have dementia and lives in a nursing facility  AUA Symptom Score      Review of Systems  Review of Systems   Constitutional: Negative  HENT: Negative  Eyes: Negative  Respiratory: Negative  Cardiovascular: Negative  Gastrointestinal: Negative  Endocrine: Negative  Genitourinary:        Per HPI   Musculoskeletal: Negative  Skin: Negative  Allergic/Immunologic: Negative  Neurological: Negative  Hematological: Negative  Psychiatric/Behavioral: Negative            Past Medical History  Past Medical History:   Diagnosis Date    Acute thyroiditis     Arthritis     Ataxia     Atrial fibrillation ([facility]     B[facility] disorder ([facility])     Bipolar disorder, unspecified ([facility] )     Coronary artery disease     Diabetes mellitus ([facility] )     Difficulty in walking     DVT [facility]deep venous thrombosis) ([facility])     Headache     Hyperlipidemia     Macular degeneration     Major depressive disorder     Muscle weakness     Psychiatric disorder     depression    Pulmonary embolism ([facility])     Weakness        Past Social History  Past Surgical History:   Procedure Laterality Date    EYE SURGERY      HYSTERECTOMY         Past Family History  Family History   Problem Relation Age of Onset    Coronary artery disease Father     Diabetes Sister     Diabetes Brother        Past Social history  Social History     Socioeconomic History    Marital status: Single     Spouse name: Not on file    Number of children: Not on file    Years of education: Not on file    Highest education level: Not on file   Occupational History    Not on file   Social Needs    Financial resource strain: Not on file    Food insecurity     Worry: Not on file     Inability: Not on file    Transportation needs     Medical: Not on file     Non-medical: Not on file   Tobacco Use    Smoking status: Former Smoker    Smokeless tobacco: Former User     Quit date: 12/26/2013   Substance and Sexual Activity    Alcohol use: No    Drug use: No    Sexual activity: Not Currently   Lifestyle    Physical activity     Days per week: Not on file     Minutes per session: Not on file    Stress: Not on file   Relationships    Social connections     Talks on phone: Not on file     Gets together: Not on file     Attends Sabianism service: Not on file     Active member of club or organization: Not on file     Attends meetings of clubs or organizations: Not on file     Relationship status: Not on file    Intimate partner violence     Fear of current or ex partner: Not on file     Emotionally abused: Not on file     Physically abused: Not on file     Forced sexual activity: Not on file   Other Topics Concern    Not on file   Social History Narrative    Not on file       Current Medications  Current Outpatient Medications   Medication Sig Dispense Refill    acetaminophen (TYLENOL) 120 mg suppository Insert 1 suppository into the rectum      acetaminophen (TYLENOL) 325 mg tablet Take 650 mg by mouth every 6 (six) hours as needed for mild pain      artificial tear (LUBRIFRESH P M ) 83-15 % ophthalmic ointment 4 (four) times a day as needed      bisacodyl (BISAC-EVAC) 10 mg suppository Insert 10 mg into the rectum daily      bisacodyl (DULCOLAX) 5 mg EC tablet Take 1 tablet by mouth daily as needed      calcium polycarbophil (FIBER-LAX) 625 mg tablet Take 625 mg by mouth daily      Colesevelam HCl (WELCHOL PO) Take by mouth      collagenase (SANTYL) ointment Apply topically daily      diclofenac sodium (VOLTAREN) 1 % Place on the skin daily      Dimethicone-Zinc Oxide-Vit A-D (A & D ZINC OXIDE) CREA Apply topically      insulin aspart (NovoLOG) 100 units/mL injection Inject 8 Units under the skin 3 (three) times a day before meals      KETOCONAZOLE, TOPICAL, (NIZORAL A-D) 1 % SHAM Apply topically      levofloxacin (LEVAQUIN) 750 mg tablet Take 750 mg by mouth every 24 hours      loperamide (IMODIUM) 2 mg capsule Take 2 mg by mouth 4 (four) times a day as needed for diarrhea      magnesium hydroxide (MILK OF MAGNESIA) 400 mg/5 mL oral suspension Take by mouth daily as needed for constipation      miconazole (JOSE ANTIFUNGAL) 2 % cream Apply topically 2 (two) times a day      mupirocin (BACTROBAN) 2 % ointment Apply topically 3 (three) times a day      OxyCODONE HCl 5 MG TABA Take by mouth      digoxin (LANOXIN) 0 125 mg tablet Take 1 tablet by mouth daily for 30 days 30 tablet 0    ferrous sulfate 325 (65 Fe) mg tablet Take 1 tablet by mouth daily with breakfast for 30 days 30 tablet 0    insulin glargine (LANTUS) 100 units/mL subcutaneous injection Inject 5 Units under the skin daily at bedtime for 200 days (Patient taking differently: Inject 20 Units under the skin daily at bedtime  ) 10 mL 0    methimazole (TAPAZOLE) 5 mg tablet Take 0 5 tablets by mouth 3 (three) times a week for 30 days (Patient taking differently: Take 2 5 mg by mouth every other day  ) 6 tablet 0    multivitamin (THERAGRAN) TABS Take 1 tablet by mouth daily for 30 days 30 tablet 0    ondansetron (ZOFRAN) 4 mg tablet Take 1 tablet by mouth every 8 (eight) hours as needed for nausea or vomiting for up to 30 days 30 tablet 0    rivaroxaban (XARELTO) 20 mg tablet Take 1 tablet by mouth daily for 30 days 30 tablet 0     No current facility-administered medications for this visit  Allergies  Allergies   Allergen Reactions    Risperdal [Risperidone]        Past Medical History, Social History, Family History, medications and allergies were reviewed  Vitals  Vitals:    01/06/21 1257   BP: 110/74   Pulse: 79       Physical Exam  Physical Exam    On examination she is in no acute distress  She is pleasant  She is on a stretcher  Suprapubic tube is draining without difficulty  The insertion site is clean  The tube looks like it was last changed more than 2-3 weeks ago  Results  No results found for: PSA  Lab Results   Component Value Date    CALCIUM 8 6 08/24/2017    K 4 0 08/24/2017    CO2 24 08/24/2017     08/24/2017    BUN 15 08/24/2017    CREATININE 0 66 08/24/2017     Lab Results   Component Value Date    WBC 5 76 08/24/2017    HGB 11 9 08/24/2017    HCT 36 9 08/24/2017    MCV 91 08/24/2017     08/24/2017         Office Urine Dip  No results found for this or any previous visit (from the past 1 hour(s))  ]        Her existing suprapubic tube was removed  The tract was prepped and draped in sterile fashion  A new 24 French suprapubic tube was inserted and 15 cc were placed into the balloon  The catheter easily irrigated and was connected to gravity drainage

## 2021-01-16 RX ORDER — LANOLIN ALCOHOL/MO/W.PET/CERES
2 CREAM (GRAM) TOPICAL
COMMUNITY

## 2021-01-16 RX ORDER — METHIMAZOLE 5 MG/1
0.5 TABLET ORAL EVERY OTHER DAY
COMMUNITY

## 2021-01-16 RX ORDER — INSULIN GLARGINE 100 [IU]/ML
62 INJECTION, SOLUTION SUBCUTANEOUS
COMMUNITY
Start: 2021-12-22

## 2021-01-16 RX ORDER — PRAMOXINE HYDROCHLORIDE 1 MG/ML
LOTION TOPICAL
COMMUNITY

## 2021-01-16 RX ORDER — MAGNESIUM HYDROXIDE 2400 MG/30ML
30 SUSPENSION ORAL DAILY PRN
COMMUNITY

## 2021-01-16 RX ORDER — DULOXETIN HYDROCHLORIDE 60 MG/1
1 CAPSULE, DELAYED RELEASE ORAL DAILY
COMMUNITY

## 2021-01-16 RX ORDER — POTASSIUM CHLORIDE 750 MG/1
1 TABLET, FILM COATED, EXTENDED RELEASE ORAL DAILY
COMMUNITY

## 2021-01-16 RX ORDER — MENTHOL AND METHYL SALICYLATE 10; 30 G/100G; G/100G
STICK TOPICAL DAILY
COMMUNITY

## 2021-01-16 RX ORDER — COLCHICINE 0.6 MG/1
1 TABLET ORAL DAILY
COMMUNITY

## 2021-01-16 RX ORDER — SENNA PLUS 8.6 MG/1
2 TABLET ORAL DAILY
COMMUNITY

## 2021-01-16 RX ORDER — HYDROXYZINE HYDROCHLORIDE 25 MG/1
25 TABLET, FILM COATED ORAL EVERY 8 HOURS PRN
COMMUNITY
End: 2022-04-27 | Stop reason: ALTCHOICE

## 2021-01-16 RX ORDER — GABAPENTIN 100 MG/1
200 CAPSULE ORAL DAILY
COMMUNITY
End: 2021-04-20 | Stop reason: ALTCHOICE

## 2021-01-21 ENCOUNTER — NURSING HOME VISIT (OUTPATIENT)
Dept: GERIATRICS | Facility: OTHER | Age: 81
End: 2021-01-21
Payer: MEDICARE

## 2021-01-21 DIAGNOSIS — F31.30 BIPOLAR AFFECTIVE DISORDER, CURRENT EPISODE DEPRESSED, CURRENT EPISODE SEVERITY UNSPECIFIED (HCC): Primary | Chronic | ICD-10-CM

## 2021-01-21 DIAGNOSIS — F02.81 LATE ONSET ALZHEIMER'S DISEASE WITH BEHAVIORAL DISTURBANCE (HCC): Chronic | ICD-10-CM

## 2021-01-21 DIAGNOSIS — G30.1 LATE ONSET ALZHEIMER'S DISEASE WITH BEHAVIORAL DISTURBANCE (HCC): Chronic | ICD-10-CM

## 2021-01-21 PROCEDURE — 99310 SBSQ NF CARE HIGH MDM 45: CPT | Performed by: NURSE PRACTITIONER

## 2021-01-21 NOTE — PROGRESS NOTES
[facility] OUTPATIENT CLINIC  [address]  POS: 32: NF- Long Term, [facility]    MEDICATION MANAGEMENT NOTE    NAME: Laron Gabriel  AGE: 78 y o  SEX: female [id number]    DATE OF ENCOUNTER: 1/21/2021    Assessment and Plan      Diagnosis ICD-10-CM Associated Orders   1  Bipolar affective disorder, current episode depressed, current episode severity unspecified ([facility] )  F31 30    2  Late onset Alzheimer's disease with behavioral disturbance (HCC)  G30 1     F02 81        Restart Zyprexa 2 5 mg HS    Treatment Recommendations/Precautions:      Medication management every 3 weeks    Medications Risks/Benefits      Risks, Benefits And Possible Side Effects Of Medications:    Risks, benefits, and possible side effects of medications explained to [place] and she verbalizes understanding and agreement for treatment  Controlled Medication Discussion:     Not applicable - controlled prescriptions are not prescribed by this practice    Evaluation of Psychotropic Drugs for possible gradual dose reductions    Psychotropic medications have been reviewed  Patient continues with symptoms of depression and psychosis as noted below  Any dose reductions at this time would be clinically contraindicated, as it would be likely to cause worsening of symptoms  Psychotherapy Provided:     Individual psychotherapy provided: Medications, treatment progress and treatment plan reviewed with [place]  Reassurance and supportive therapy provided  Chief Complaint     Follow up for bipolar disorder and paranoia    History of Present Illness     Patient is seen in follow up for Bipolar disorder  Staff is reporting some increase/re-occurrence of paranoia/delusional content  She has an allergy to Risperdal - "ataxia"  Chart does indicate she has previously tolerated Zyprexa and will restart at 2 5 mg    Patient minimizes paranoia, but does admit to increase in mood lability and increase in tearfulness  Worsening depression since COVID restrictions  She denies any suicidal thoughts or passive death wish  Depression  This is a recurrent problem  The current episode started more than 1 year ago  The problem has been gradually worsening  The treatment provided no relief  She reports fluctuating sleep pattern, fluctuating appetite, fluctuating energy levels    The following portions of the patient's history were reviewed and updated as appropriate: allergies, current medications, past family history, past medical history, past social history, past surgical history and problem list     Review of Systems     Review of Systems   Psychiatric/Behavioral: Positive for behavioral problems, decreased concentration, depression and dysphoric mood  All other systems reviewed and are negative  Active Problem List     Patient Active Problem List   Diagnosis    Diabetes mellitus ([facility] )    Chronic pulmonary embolism without acute cor pulmonale ([facility])    DVT (deep venous thrombosis) ([facility])    Atrial fibrillation ([facility])    Bipolar disorder ([facility] )    Late onset Alzheimer's disease with behavioral disturbance ([facility] )    Hyperthyroidism    Debility    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, of the knee    Constipation    Neurogenic bladder    Other insomnia    Ileus ([facility] )    MDD (major depressive disorder)    Other chronic pain    Neuropathy    Goals of care, counseling/discussion    Suprapubic catheter ([facility] )    Pruritus    Xerosis cutis       Objective       Physical Exam  Vitals signs and nursing note reviewed  Psychiatric:         Mood and Affect: Mood is anxious and depressed  Affect is flat  Behavior: Behavior is withdrawn  Thought Content: Thought content is paranoid  Cognition and Memory: Cognition normal        Pertinent Laboratory/Diagnostic Studies:  I have personally reviewed pertinent lab results        Mental Status Evaluation:    Appearance age appropriate, casually dressed   Behavior cooperative, calm   Speech slow   Mood depressed   Affect tearful, flat   Thought Processes goal directed   Associations circumstantial associations   Thought Content some paranoia   Perceptual Disturbances: denies auditory hallucinations when asked   Abnormal Thoughts  Risk Potential Suicidal ideation - None  Homicidal ideation - None  Potential for aggression - No   Orientation oriented to person and place   Memory recent and remote memory grossly intact   Consciousness alert and awake   Attention Span Concentration Span attention span and concentration appear shorter than expected for age   Intellect appears to be of average intelligence   Insight limited   Judgement limited   Muscle Strength and  Gait uses wheelchair, uses walker   Motor activity no abnormal movements   Language no difficulty naming common objects, no difficulty repeating a phrase, no difficulty writing a sentence   Fund of Knowledge adequate knowledge of current events  adequate fund of knowledge regarding past history  adequate fund of knowledge regarding vocabulary        Current Medications       Current Outpatient Medications:     acetaminophen (TYLENOL) 325 mg tablet, Take 650 mg by mouth every 6 (six) hours as needed for mild pain, Disp: , Rfl:     acetaminophen (TYLENOL) 650 mg suppository, Insert 1 suppository into the rectum every 6 (six) hours as needed , Disp: , Rfl:     artificial tear (LUBRIFRESH P M ) 83-15 % ophthalmic ointment, 4 (four) times a day as needed, Disp: , Rfl:     bisacodyl (BISAC-EVAC) 10 mg suppository, Insert 10 mg into the rectum daily, Disp: , Rfl:     bisacodyl (DULCOLAX) 5 mg EC tablet, Take 1 tablet by mouth daily as needed, Disp: , Rfl:     calcium polycarbophil (FIBER-LAX) 625 mg tablet, Take 625 mg by mouth daily, Disp: , Rfl:     colchicine (COLCRYS) 0 6 mg tablet, Take 0 6 mg by mouth daily, Disp: , Rfl:     Colesevelam HCl (WELCHOL PO), Take by mouth, Disp: , Rfl:     collagenase (SANTYL) ointment, Apply topically daily, Disp: , Rfl:     diclofenac sodium (VOLTAREN) 1 %, Place on the skin daily, Disp: , Rfl:     DIGOXIN PO, Take 0 125 mg by mouth daily, Disp: , Rfl:     Dimethicone-Zinc Oxide-Vit A-D (A & D ZINC OXIDE) CREA, Apply topically, Disp: , Rfl:     DULoxetine (CYMBALTA) 60 mg delayed release capsule, Take 60 mg by mouth daily, Disp: , Rfl:     ferrous sulfate 325 (65 Fe) mg tablet, Take 1 tablet by mouth daily with breakfast for 30 days, Disp: 30 tablet, Rfl: 0    gabapentin (NEURONTIN) 100 mg capsule, Take 200 mg by mouth daily, Disp: , Rfl:     hydrOXYzine HCL (ATARAX) 25 mg tablet, Take 25 mg by mouth every 8 (eight) hours as needed for itching itching, Disp: , Rfl:     insulin aspart (NovoLOG) 100 units/mL injection, Inject 10 Units under the skin daily , Disp: , Rfl:     insulin glargine (LANTUS) 100 units/mL subcutaneous injection, Inject 46 Units under the skin daily at bedtime, Disp: , Rfl:     KETOCONAZOLE, TOPICAL, (NIZORAL A-D) 1 % SHAM, Apply topically, Disp: , Rfl:     levofloxacin (LEVAQUIN) 750 mg tablet, Take 750 mg by mouth every 24 hours, Disp: , Rfl:     loperamide (IMODIUM) 2 mg capsule, Take 2 mg by mouth 4 (four) times a day as needed for diarrhea, Disp: , Rfl:     magnesium hydroxide (MILK OF MAGNESIA) 400 mg/5 mL oral suspension, Take by mouth daily as needed for constipation, Disp: , Rfl:     magnesium hydroxide (Milk of Magnesia) 400 mg/5 mL oral suspension, Take 30 mL by mouth daily as needed for constipation, Disp: , Rfl:     melatonin 3 mg, Take 6 mg by mouth daily at bedtime, Disp: , Rfl:     Menthol-Methyl Salicylate (Icy Hot) 15-14 % STCK, Apply topically daily Bilateral knees, Disp: , Rfl:     Menthol-Zinc Oxide (CALMOSEPTINE EX), Apply topically Apply topically to gluteal areas, Disp: , Rfl:     methimazole (TAPAZOLE) 5 mg tablet, Take 5 mg by mouth every other day as needed, Disp: , Rfl:     miconazole (JOSE ANTIFUNGAL) 2 % cream, Apply topically 2 (two) times a day, Disp: , Rfl:     multivitamin (THERAGRAN) TABS, Take 1 tablet by mouth daily for 30 days, Disp: 30 tablet, Rfl: 0    mupirocin (BACTROBAN) 2 % ointment, Apply topically 3 (three) times a day, Disp: , Rfl:     ondansetron (ZOFRAN) 4 mg tablet, Take 1 tablet by mouth every 8 (eight) hours as needed for nausea or vomiting for up to 30 days, Disp: 30 tablet, Rfl: 0    OxyCODONE HCl 5 MG TABA, Take 2 5 mg by mouth every 8 (eight) hours as needed , Disp: , Rfl:     potassium chloride (Klor-Con) 10 mEq tablet, Take 10 mEq by mouth daily, Disp: , Rfl:     Pramoxine HCl (Sarna Sensitive) 1 % LOTN, Apply topically Bilateral uppper extremities, Disp: , Rfl:     rivaroxaban (Xarelto) 20 mg tablet, Take 20 mg by mouth daily, Disp: , Rfl:     senna (SENOKOT) 8 6 MG tablet, Take 1 tablet by mouth daily, Disp: , Rfl:     SODIUM PHOSPHATES RE, Insert into the rectum daily as needed, Disp: , Rfl:       Counseling / Coordination of Care  Total floor / unit time spent today 35 minutes  Greater than 50% of total time was spent with the patient and / or family counseling and / or coordination of care       MARIANA Jackson 01/21/21

## 2021-02-12 ENCOUNTER — NURSING HOME VISIT (OUTPATIENT)
Dept: GERIATRICS | Facility: OTHER | Age: 81
End: 2021-02-12
Payer: MEDICARE

## 2021-02-12 VITALS
BODY MASS INDEX: 27.1 KG/M2 | HEART RATE: 68 BPM | TEMPERATURE: 98.7 F | WEIGHT: 183.5 LBS | SYSTOLIC BLOOD PRESSURE: 108 MMHG | DIASTOLIC BLOOD PRESSURE: 77 MMHG

## 2021-02-12 DIAGNOSIS — G62.9 NEUROPATHY: ICD-10-CM

## 2021-02-12 DIAGNOSIS — I82.409 DEEP VEIN THROMBOSIS (DVT) OF LOWER EXTREMITY, UNSPECIFIED CHRONICITY, UNSPECIFIED LATERALITY, UNSPECIFIED VEIN (HCC): Chronic | ICD-10-CM

## 2021-02-12 DIAGNOSIS — G30.1 LATE ONSET ALZHEIMER'S DISEASE WITH BEHAVIORAL DISTURBANCE (HCC): Chronic | ICD-10-CM

## 2021-02-12 DIAGNOSIS — E11.9 TYPE 2 DIABETES MELLITUS WITHOUT COMPLICATION, WITH LONG-TERM CURRENT USE OF INSULIN (HCC): Primary | Chronic | ICD-10-CM

## 2021-02-12 DIAGNOSIS — Z79.4 TYPE 2 DIABETES MELLITUS WITHOUT COMPLICATION, WITH LONG-TERM CURRENT USE OF INSULIN (HCC): Primary | Chronic | ICD-10-CM

## 2021-02-12 DIAGNOSIS — G89.29 OTHER CHRONIC PAIN: ICD-10-CM

## 2021-02-12 DIAGNOSIS — E05.90 HYPERTHYROIDISM: ICD-10-CM

## 2021-02-12 DIAGNOSIS — F02.81 LATE ONSET ALZHEIMER'S DISEASE WITH BEHAVIORAL DISTURBANCE (HCC): Chronic | ICD-10-CM

## 2021-02-12 DIAGNOSIS — F33.9 RECURRENT MAJOR DEPRESSIVE DISORDER, REMISSION STATUS UNSPECIFIED (HCC): ICD-10-CM

## 2021-02-12 PROCEDURE — 99309 SBSQ NF CARE MODERATE MDM 30: CPT | Performed by: PHYSICIAN ASSISTANT

## 2021-02-12 NOTE — ASSESSMENT & PLAN NOTE
Lab Results   Component Value Date    HGBA1C 8 3 (H) 01/05/2021       Blood sugar log reviewed, stable  Continue lantus/novolog

## 2021-02-12 NOTE — ASSESSMENT & PLAN NOTE
Follows with urology  Hx of neurogenic bladder  Sediment noted in pantoja bag today, nursing change bag and urine now appears to be draining clear   Will continue to monitor

## 2021-02-12 NOTE — PROGRESS NOTES
[facility]  [phone]) [address]  Code  32      NAME: Eric Jones  AGE: 78 y o  SEX: female [id number]    DATE OF ENCOUNTER: 2/12/2021    CODE STATUS: DNR/DNH    Assessment and Plan     Problem List Items Addressed This Visit        Endocrine    Diabetes mellitus ([address] ) - Primary (Chronic)       Lab Results   Component Value Date    HGBA1C 8 3 (H) 01/05/2021       Blood sugar log reviewed, stable  Continue lantus/novolog         Hyperthyroidism     Continue methimazole            Cardiovascular and Mediastinum    DVT (deep venous thrombosis) (HCC) (Chronic)     On xarelto            Nervous and Auditory    Late onset Alzheimer's disease with behavioral disturbance (HCC) (Chronic)     Stable  Manage chronic conditions  Continue 24/7 supportive care         Neuropathy     Continue gabapentin            Other    MDD (major depressive disorder)     Continue olanzapine/duloxetine         Other chronic pain     Continue oxycodone               No orders of the defined types were placed in this encounter  Chief Complaint     Chief Complaint   Patient presents with    Geriatric Evaluation     follow up for chronic conditions       History of Present Illness   78year old female resident of [address] [place] [address] being seen today in collaboration with nursing for follow up for chronic conditions  Patient offers no complaints  Nursing has no concerns  The following portions of the patient's history were reviewed and updated as appropriate: allergies, current medications, past family history, past medical history, past social history, past surgical history and problem list     Review of Systems   Review of Systems   Constitutional: Negative  HENT: Negative  Eyes: Negative  Respiratory: Negative  Cardiovascular: Negative  Gastrointestinal: Negative  Endocrine: Negative  Genitourinary: Negative  Allergic/Immunologic: Negative  Neurological: Negative  Hematological: Negative  Psychiatric/Behavioral: Negative  Active Problem List     Patient Active Problem List   Diagnosis    Diabetes mellitus ([place] [address] )    Chronic pulmonary [facility] without acute cor pulmonale [facility]    DVT (deep venous thrombosis) ([facility])    Atrial fibrillation ([facility])    Bipolar disorder ([place] [address] )    Late onset Alzheimer's disease with behavioral disturbance ([place] [address] )    Hyperthyroidism    Debility    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, of the knee    Constipation    Neurogenic bladder    Other insomnia    Ileus ([place] [address] )    MDD (major depressive disorder)    Other chronic pain    Neuropathy    Goals of care, counseling/discussion    Suprapubic catheter ([facility])    Pruritus    Xerosis cutis         Objective     /77   Pulse 68   Temp 98 7 °F (37 1 °C)   Wt 83 2 kg (183 lb 8 oz)   BMI 27 10 kg/m²     Physical Exam  Vitals signs reviewed  Constitutional:       General: She is not in acute distress  Appearance: She is not ill-appearing or diaphoretic  HENT:      Head: Normocephalic and atraumatic  Nose: Nose normal  No congestion  Eyes:      General: No scleral icterus  Conjunctiva/sclera: Conjunctivae normal       Pupils: Pupils are equal, round, and reactive to light  Neck:      Musculoskeletal: Normal range of motion and neck supple  No muscular tenderness  Cardiovascular:      Rate and Rhythm: Normal rate and regular rhythm  Pulmonary:      Effort: Pulmonary effort is normal  No respiratory distress  Breath sounds: Normal breath sounds  No wheezing  Abdominal:      General: Abdomen is flat  Bowel sounds are normal  There is no distension  Palpations: Abdomen is soft  Tenderness: There is no abdominal tenderness  Genitourinary:     Comments: Agarwal with sediment in tubing  Musculoskeletal:         General: No deformity or signs of injury  Lymphadenopathy:      Cervical: No cervical adenopathy  Skin:     General: Skin is warm and dry  Coloration: Skin is not jaundiced  Findings: No rash  Neurological:      Mental Status: She is alert  Mental status is at baseline  Psychiatric:         Mood and Affect: Mood normal          Behavior: Behavior normal          Pertinent Laboratory/Diagnostic Studies:    1/5/21  hgb 12 0  Creat 0 91    Current Medications   Medications reviewed and updated in facility chart

## 2021-02-15 ENCOUNTER — OFFICE VISIT (OUTPATIENT)
Dept: GASTROENTEROLOGY | Facility: MEDICAL CENTER | Age: 81
End: 2021-02-15
Payer: MEDICARE

## 2021-02-15 DIAGNOSIS — K56.7 ILEUS (HCC): Primary | ICD-10-CM

## 2021-02-15 DIAGNOSIS — K59.00 CONSTIPATION, UNSPECIFIED CONSTIPATION TYPE: ICD-10-CM

## 2021-02-15 PROCEDURE — 1123F ACP DISCUSS/DSCN MKR DOCD: CPT | Performed by: PHYSICIAN ASSISTANT

## 2021-02-15 PROCEDURE — 99214 OFFICE O/P EST MOD 30 MIN: CPT | Performed by: PHYSICIAN ASSISTANT

## 2021-02-15 RX ORDER — POLYETHYLENE GLYCOL 3350 17 G/17G
17 POWDER, FOR SOLUTION ORAL DAILY
Qty: 30 EACH | Refills: 6 | Status: SHIPPED | OUTPATIENT
Start: 2021-02-15

## 2021-02-15 NOTE — PROGRESS NOTES
Assessment/Plan:     Diagnoses and all orders for this visit:    Ileus ([address] )  Constipation, unspecified constipation type   patient presents with complaints of abdominal distention  She has been seen multiple times in the past for the same and has imaging studies consistent with chronic ileus and constipation  She is not currently on any daily medications for her bowels  Would recommend starting MiraLax on a daily basis  I believe this  Was previously discontinued due to loose bowel movements that they thought were diarrhea however were truly overflow incontinence  I asked them to call us within the next few weeks if her symptoms do not improve and we can consider  Increasing to twice a day  If no improvement with that regimen can consider repeating colonoscopy if patient is agreeable  -     polyethylene glycol (MIRALAX) 17 g packet; Take 17 g by mouth daily              Subjective:      Patient ID: Pamela Franco is a 78 y o  female  HPI       This is a follow-up for abdominal distention and bloating  Patient was seen approximately 3 years ago for the same symptoms  At that time she was described bring watery bowel movements but had a history of chronic constipation and it was felt her symptoms were secondary to overflow  She did have a KUB that showed ileus as well as moderate fecal retention  I did instruct them at that time to hold all anti diarrheal medications  According to her aide she typically has a bowel movement every other day  She is not on any medications for her bowels other than p r n  medications if she does not have a bowel movement after several days  The patient complains of increasing abdominal distention and discomfort  She has had multiple KUB's showing chronic ileus  She is bed-bound  She reportedly had a colonoscopy many years ago, report is unavailable  She has previously refused repeating any procedures, EGD was recommended for history of iron deficiency      She had CBC & TSH in  January that were within normal limits  Her last hemoglobin A1c was 8 3      Patient Active Problem List   Diagnosis    Diabetes mellitus ([place] [address] )    Chronic pulmonary embolism without acute cor pulmonale ([facility])    DVT (deep venous thrombosis) ([facility])    Atrial fibrillation ([facility])    Bipolar disorder ([place] [address] )    Late onset Alzheimer's disease with behavioral disturbance ([place] [address] )    Hyperthyroidism    Debility    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, of the knee    Constipation    Neurogenic bladder    Other insomnia    Ileus ([place] [address] )    MDD (major depressive disorder)    Other chronic pain    Neuropathy    Goals of care, counseling/discussion    Suprapubic catheter ([facility])    Pruritus    Xerosis cutis     Allergies   Allergen Reactions    Risperdal [Risperidone]      Current Outpatient Medications on File Prior to Visit   Medication Sig    acetaminophen (TYLENOL) 325 mg tablet Take 650 mg by mouth every 6 (six) hours as needed for mild pain    acetaminophen (TYLENOL) 650 mg suppository Insert 1 suppository into the rectum every 6 (six) hours as needed     artificial tear (LUBRIFRESH P M ) 83-15 % ophthalmic ointment 4 (four) times a day as needed    bisacodyl (BISAC-EVAC) 10 mg suppository Insert 10 mg into the rectum daily    bisacodyl (DULCOLAX) 5 mg EC tablet Take 1 tablet by mouth daily as needed    calcium polycarbophil (FIBER-LAX) 625 mg tablet Take 625 mg by mouth daily    colchicine (COLCRYS) 0 6 mg tablet Take 0 6 mg by mouth daily    Colesevelam HCl (WELCHOL PO) Take by mouth    collagenase (SANTYL) ointment Apply topically daily    diclofenac sodium (VOLTAREN) 1 % Place on the skin daily    DIGOXIN PO Take 0 125 mg by mouth daily    Dimethicone-Zinc Oxide-Vit A-D (A & D ZINC OXIDE) CREA Apply topically    DULoxetine (CYMBALTA) 60 mg delayed release capsule Take 60 mg by mouth daily    gabapentin (NEURONTIN) 100 mg capsule Take 200 mg by mouth daily    hydrOXYzine HCL (ATARAX) 25 mg tablet Take 25 mg by mouth every 8 (eight) hours as needed for itching itching    insulin aspart (NovoLOG) 100 units/mL injection Inject 10 Units under the skin daily     insulin glargine (LANTUS) 100 units/mL subcutaneous injection Inject 46 Units under the skin daily at bedtime    KETOCONAZOLE, TOPICAL, (NIZORAL A-D) 1 % SHAM Apply topically    levofloxacin (LEVAQUIN) 750 mg tablet Take 750 mg by mouth every 24 hours    loperamide (IMODIUM) 2 mg capsule Take 2 mg by mouth 4 (four) times a day as needed for diarrhea    magnesium hydroxide (MILK OF MAGNESIA) 400 mg/5 mL oral suspension Take by mouth daily as needed for constipation    magnesium hydroxide (Milk of Magnesia) 400 mg/5 mL oral suspension Take 30 mL by mouth daily as needed for constipation    melatonin 3 mg Take 6 mg by mouth daily at bedtime    Menthol-Methyl Salicylate (Icy Hot) 34-81 % STCK Apply topically daily Bilateral knees    Menthol-Zinc Oxide (CALMOSEPTINE EX) Apply topically Apply topically to gluteal areas    methimazole (TAPAZOLE) 5 mg tablet Take 5 mg by mouth every other day as needed    miconazole (JOSE ANTIFUNGAL) 2 % cream Apply topically 2 (two) times a day    mupirocin (BACTROBAN) 2 % ointment Apply topically 3 (three) times a day    OxyCODONE HCl 5 MG TABA Take 2 5 mg by mouth every 8 (eight) hours as needed     potassium chloride (Klor-Con) 10 mEq tablet Take 10 mEq by mouth daily    Pramoxine HCl (Sarna Sensitive) 1 % LOTN Apply topically Bilateral uppper extremities    rivaroxaban (Xarelto) 20 mg tablet Take 20 mg by mouth daily    senna (SENOKOT) 8 6 MG tablet Take 1 tablet by mouth daily    SODIUM PHOSPHATES RE Insert into the rectum daily as needed    ferrous sulfate 325 (65 Fe) mg tablet Take 1 tablet by mouth daily with breakfast for 30 days    multivitamin (THERAGRAN) TABS Take 1 tablet by mouth daily for 30 days    ondansetron (ZOFRAN) 4 mg tablet Take 1 tablet by mouth every 8 (eight) hours as needed for nausea or vomiting for up to 30 days     No current facility-administered medications on file prior to visit        Family History   Problem Relation Age of Onset    Coronary artery disease Father     Diabetes Sister     Diabetes Brother      Past Medical History:   Diagnosis Date    Acute thyroiditis     Arthritis     Ataxia     Atrial fibrillation ([place] [address] )     [facility]ipolar disorder ([facility])     Bipolar disorder, unspecified ([place] [address] )     Coronary artery disease     Diabetes mellitus ([place] [address] )     Difficulty in walking     DVT (deep venous thrombosis) ([facility])     Headache     Hyperlipidemia     Macular degeneration     Major depressive disorder     Muscle weakness     Psychiatric disorder     depression    Pulmonary embolism ([facility])     Weakness      Social History     Socioeconomic History    Marital status: Single     Spouse name: None    Number of children: None    Years of education: None    Highest education level: None   Occupational History    None   Social Needs    Financial resource strain: None    Food insecurity     Worry: None     Inability: None    Transportation needs     Medical: None     Non-medical: None   Tobacco Use    Smoking status: Former Smoker    Smokeless tobacco: Former User     Quit date: 12/26/2013   Substance and Sexual Activity    Alcohol use: No    Drug use: No    Sexual activity: Not Currently   Lifestyle    Physical activity     Days per week: None     Minutes per session: None    Stress: None   Relationships    Social connections     Talks on phone: None     Gets together: None     Attends Synagogue service: None     Active member of club or organization: None     Attends meetings of clubs or organizations: None     Relationship status: None    Intimate partner violence     Fear of current or ex partner: None     Emotionally abused: None     Physically abused: None     Forced sexual activity: None   Other Topics Concern    None Social History Narrative    None     Past Surgical History:   Procedure Laterality Date    EYE SURGERY      HYSTERECTOMY           Review of Systems   All other systems reviewed and are negative  Objective: There were no vitals taken for this visit  Physical Exam  Constitutional:       Appearance: Normal appearance  She is well-developed  HENT:      Head: Normocephalic and atraumatic  Eyes:      Conjunctiva/sclera: Conjunctivae normal    Neck:      Musculoskeletal: Normal range of motion  Cardiovascular:      Rate and Rhythm: Normal rate and regular rhythm  Pulmonary:      Effort: Pulmonary effort is normal       Breath sounds: Normal breath sounds  Abdominal:      General: Bowel sounds are normal  There is distension  Palpations: Abdomen is soft  Tenderness: There is abdominal tenderness  Comments: Her abdomen is distended and tympanitic however has good bowel sounds with minimal tenderness to palpation   Musculoskeletal: Normal range of motion  Skin:     General: Skin is warm and dry  Neurological:      Mental Status: She is alert and oriented to person, place, and time     Psychiatric:         Mood and Affect: Mood normal          Behavior: Behavior normal

## 2021-04-16 ENCOUNTER — NURSING HOME VISIT (OUTPATIENT)
Dept: GERIATRICS | Facility: OTHER | Age: 81
End: 2021-04-16
Payer: MEDICARE

## 2021-04-16 DIAGNOSIS — R53.81 DEBILITY: ICD-10-CM

## 2021-04-16 DIAGNOSIS — I48.91 ATRIAL FIBRILLATION, UNSPECIFIED TYPE (HCC): Primary | Chronic | ICD-10-CM

## 2021-04-16 DIAGNOSIS — E05.90 HYPERTHYROIDISM: ICD-10-CM

## 2021-04-16 DIAGNOSIS — N31.9 NEUROGENIC BLADDER: ICD-10-CM

## 2021-04-16 DIAGNOSIS — E11.9 TYPE 2 DIABETES MELLITUS WITHOUT COMPLICATION, WITH LONG-TERM CURRENT USE OF INSULIN (HCC): Chronic | ICD-10-CM

## 2021-04-16 DIAGNOSIS — Z71.89 GOALS OF CARE, COUNSELING/DISCUSSION: ICD-10-CM

## 2021-04-16 DIAGNOSIS — Z79.4 TYPE 2 DIABETES MELLITUS WITHOUT COMPLICATION, WITH LONG-TERM CURRENT USE OF INSULIN (HCC): Chronic | ICD-10-CM

## 2021-04-16 DIAGNOSIS — F31.30 BIPOLAR AFFECTIVE DISORDER, CURRENT EPISODE DEPRESSED, CURRENT EPISODE SEVERITY UNSPECIFIED (HCC): Chronic | ICD-10-CM

## 2021-04-16 PROCEDURE — 99309 SBSQ NF CARE MODERATE MDM 30: CPT | Performed by: INTERNAL MEDICINE

## 2021-04-16 NOTE — PROGRESS NOTES
[address]  [address] [place] [address]  Facility: [facility]  32    NAME: Milana Bettencourt  AGE: 78 y o  SEX: female    DATE OF ENCOUNTER: 4/16/2021    Code status:  DNR/DNH    Assessment and Plan     1  Atrial fibrillation, unspecified type [facility])  Assessment & Plan:  · She is doing well with digoxin for rate control  · She is doing well with rivaroxaban for anticoagulation   · I will continue her on these medication  · Will continue with clinical and periodic laboratory monitoring for change in condition      2  Debility  Assessment & Plan:  · Secondary to her chronic medical conditions   · She continues to require 24/7 care/support of her ADLs   · I recommend continued care/support of her ADLs as a long-term resident at the nursing facility  · Will continue to monitor for change in her condition      3  Bipolar affective disorder, current episode depressed, current episode severity unspecified ([facility] [address] )  Assessment & Plan:  · She is following with the psychiatry service for care   · Currently, she is prescribed olanzapine at bedtime, gabapentin, and duloxetine  · Will continue her care in collaboration with the psychiatry service   · Will continue to monitor for change in condition      4  Type 2 diabetes mellitus without complication, with long-term current use of insulin ([facility])  Assessment & Plan:    · She continues on lispro and aspart insulins   · Will continue with adjustment of her insulins based on fingerstick blood sugar results   · Will continue to monitor for change in condition      5  Hyperthyroidism  Assessment & Plan:  · January 5, 2021: TSH:  1 96   · She has been doing well with methimazole every other day  · Will continue her on this medication regimen  · Will continue with clinical and periodic laboratory monitoring for change in condition      6   Neurogenic bladder  Assessment & Plan:  · She is doing well status post suprapubic catheter placement   · Continue with local care by nursing staff at nursing facility  · Continue care in collaboration with the Urology service   · Continue to monitor for change in her condition      7  Goals of care, counseling/discussion  Assessment & Plan:  · Her hospitalization and resuscitation status are "do not resuscitate" and "do not hospitalize"        All medications and routine orders were reviewed and updated as needed  Plan discussed with:    Nursing staff  Chief Complaint     She is seen for a follow-up visit to update the care and treatment of her atrial fibrillation, debility secondary to her chronic medical conditions, hyperthyroidism, and insulin-requiring type 2 diabetes mellitus  History of Present Illness     She is an 13-year-old woman who is seen with nursing staff for a follow-up visit to update the care and treatment of her atrial fibrillation-doing well with digoxin and rivaroxaban, hypothyroidism-doing well with methimazole, debility secondary to her chronic medical conditions-she continues to require 24/7 care/support of her ADLs, and insulin-requiring type 2 diabetes mellitus- she continues on lispro and aspartate insulins  Other than her chronic knee pain, she endorses having no complaints or concerns for me during the visit  She denies chest pain, shortness of breath, and constipation  She reports that her appetite is good  The following portions of the patient's history were reviewed and updated as appropriate: current medications, past family history, past medical history, past social history, past surgical history and problem list     Allergies: Allergies   Allergen Reactions    Risperdal [Risperidone]        Review of Systems     Review of Systems   Constitutional: Negative for appetite change  Respiratory: Negative for stridor  Cardiovascular: Negative for chest pain  Gastrointestinal: Negative for abdominal pain and constipation     Musculoskeletal: Positive for arthralgias (  Knees)  Medications and orders     All medications reviewed and updated in Nursing Home EMR  Objective     Vitals:  Monthly vitals:  Weight 183 5 lb, pulse 72, blood pressure 172/62, fasting fingerstick blood sugar 243  Review of her weight, BP, and AP logs looks well  Physical Exam  Vitals signs reviewed  Exam conducted with a chaperone present  Constitutional:       General: She is awake  She is not in acute distress  Appearance: She is well-developed  She is not ill-appearing, toxic-appearing or diaphoretic  Comments: She appears comfortable lying in bed, her stated age, and frail  Eyes:      General: No scleral icterus  Conjunctiva/sclera: Conjunctivae normal    Cardiovascular:      Rate and Rhythm: Normal rate and regular rhythm  Heart sounds: Normal heart sounds  No murmur  No friction rub  No gallop  Comments: There is no pretibial edema, bilaterally  She is wearing Prevalon boots, bilaterally  Pulmonary:      Effort: Pulmonary effort is normal  No respiratory distress  Breath sounds: Normal breath sounds  No stridor  No wheezing, rhonchi or rales  Abdominal:      General: There is distension  Palpations: Abdomen is soft  There is no mass  Tenderness: There is no abdominal tenderness  There is no guarding or rebound  Neurological:      Mental Status: She is alert  Psychiatric:         Mood and Affect: Mood normal          Behavior: Behavior is cooperative  Pertinent Laboratory/Diagnostic Studies: The following labs were reviewed please see chart or hospital paperwork for details  January 5, 2021:     Digoxin:  0 6   TSH:  1 96  Hemoglobin A1c:  8 3%,     - Her monthly order summary was reviewed and signed  Treatment plan reviewed with nursing staff      NANY Estrada   4/20/2021 11:27 AM

## 2021-04-20 PROBLEM — Z79.899 MEDICATION MANAGEMENT: Status: ACTIVE | Noted: 2021-04-20

## 2021-04-20 RX ORDER — GABAPENTIN 100 MG/1
2 CAPSULE ORAL DAILY
COMMUNITY

## 2021-04-20 RX ORDER — DIGOXIN 125 MCG
1 TABLET ORAL DAILY
COMMUNITY

## 2021-04-20 RX ORDER — KETOCONAZOLE 20 MG/G
1 CREAM TOPICAL 2 TIMES DAILY
COMMUNITY

## 2021-04-20 RX ORDER — OLANZAPINE 5 MG/1
1 TABLET ORAL
COMMUNITY
Start: 2021-01-29

## 2021-04-20 RX ORDER — GABAPENTIN 300 MG/1
1 CAPSULE ORAL
COMMUNITY

## 2021-04-20 NOTE — ASSESSMENT & PLAN NOTE
· She is following with the psychiatry service for care   · Currently, she is prescribed olanzapine at bedtime, gabapentin, and duloxetine  · Will continue her care in collaboration with the psychiatry service   · Will continue to monitor for change in condition

## 2021-04-20 NOTE — ASSESSMENT & PLAN NOTE
· She is doing well status post suprapubic catheter placement   · Continue with local care by nursing staff at nursing facility  · Continue care in collaboration with the Urology service   · Continue to monitor for change in her condition

## 2021-04-20 NOTE — ASSESSMENT & PLAN NOTE
· Secondary to her chronic medical conditions   · She continues to require 24/7 care/support of her ADLs   · I recommend continued care/support of her ADLs as a long-term resident at the nursing facility  · Will continue to monitor for change in her condition

## 2021-04-20 NOTE — ASSESSMENT & PLAN NOTE
· January 5, 2021: TSH:  1 96   · She has been doing well with methimazole every other day  · Will continue her on this medication regimen  · Will continue with clinical and periodic laboratory monitoring for change in condition

## 2021-04-20 NOTE — ASSESSMENT & PLAN NOTE
· She is doing well with digoxin for rate control  · She is doing well with rivaroxaban for anticoagulation   · I will continue her on these medication  · Will continue with clinical and periodic laboratory monitoring for change in condition

## 2021-04-20 NOTE — ASSESSMENT & PLAN NOTE
· She continues on lispro and aspart insulins   · Will continue with adjustment of her insulins based on fingerstick blood sugar results   · Will continue to monitor for change in condition

## 2021-05-25 ENCOUNTER — NURSING HOME VISIT (OUTPATIENT)
Dept: GERIATRICS | Facility: OTHER | Age: 81
End: 2021-05-25
Payer: MEDICARE

## 2021-05-25 DIAGNOSIS — G47.09 OTHER INSOMNIA: ICD-10-CM

## 2021-05-25 DIAGNOSIS — F02.81 LATE ONSET ALZHEIMER'S DISEASE WITH BEHAVIORAL DISTURBANCE (HCC): Chronic | ICD-10-CM

## 2021-05-25 DIAGNOSIS — F33.9 RECURRENT MAJOR DEPRESSIVE DISORDER, REMISSION STATUS UNSPECIFIED (HCC): Primary | ICD-10-CM

## 2021-05-25 DIAGNOSIS — G30.1 LATE ONSET ALZHEIMER'S DISEASE WITH BEHAVIORAL DISTURBANCE (HCC): Chronic | ICD-10-CM

## 2021-05-25 PROCEDURE — 99308 SBSQ NF CARE LOW MDM 20: CPT | Performed by: NURSE PRACTITIONER

## 2021-05-29 NOTE — PROGRESS NOTES
[facility] OUTPATIENT CLINIC  [address]  POS: 32: NF- Long Term, [facility]    MEDICATION MANAGEMENT NOTE    NAME: Glenna Cuadra  AGE: 78 y o  SEX: female [id number]    DATE OF ENCOUNTER: 5/25/2021    Assessment and Plan      Diagnosis ICD-10-CM Associated Orders   1  Recurrent major depressive disorder, remission status unspecified (HCC)  F33 9    2  Late onset Alzheimer's disease with behavioral disturbance (HCC)  G30 1     F02 81    3  Other insomnia  G47 09        Continue medications the same    Treatment Recommendations/Precautions:      Medication management every 3 months    Medications Risks/Benefits      Risks, Benefits And Possible Side Effects Of Medications:    Risks, benefits, and possible side effects of medications explained to Gabbie and she verbalizes understanding and agreement for treatment  Controlled Medication Discussion:     Gabbie has been filling controlled prescriptions on time as prescribed according to [facility]    Evaluation of Psychotropic Drugs for possible gradual dose reductions    Psychotropic medications have been reviewed  Patient continues with symptoms of depression and agitation as noted below  Any dose reductions at this time would be clinically contraindicated, as it would be likely to cause worsening of symptoms  Psychotherapy Provided:     Individual psychotherapy provided: Reassurance and supportive therapy provided  Chief Complaint     Follow up for depression and agitation/paranoia    History of Present Illness     Gabbie is seen in follow up for mood disorder with psychosis and agitation  Last seen in January, when she had increase of paranoia with yelling out /agitation  At that time, was restarted on Zyprexa and increased to 5 mg  She no long presents with paranoia and agitation  She has been calm and cooperative    She has some depressed mood, especially since the passing of her roommate  Staff reports no unwanted behaviors  Will continue medications the same and follow up in 3 months  Depression  This is a chronic problem  The current episode started more than 1 year ago  The problem occurs intermittently  The problem has been waxing and waning  The treatment provided mild relief  She reports fluctuating sleep pattern, fluctuating appetite, fluctuating energy levels    The following portions of the patient's history were reviewed and updated as appropriate: allergies, current medications, past family history, past medical history, past social history, past surgical history and problem list     Review of Systems     Review of Systems   Psychiatric/Behavioral: Positive for confusion, decreased concentration, depression and dysphoric mood  All other systems reviewed and are negative  Active Problem List     Patient Active Problem List   Diagnosis    Diabetes mellitus ([facility] )    Chronic pulmonary embolism without acute cor pulmonale (HCC)    [facility]) [facility]    Atrial fibrillation (HCC)    Bipolar disorder ([facility] )    Late onset Alzheimer's disease with behavioral disturbance ([facility] )    Hyperthyroidism    Debility    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, of the knee    Constipation    Neurogenic bladder    Other insomnia    Ileus ([facility] )    MDD (major depressive disorder)    Other chronic pain    Neuropathy    Goals of care, counseling/discussion    Suprapubic catheter ([facility] )    Pruritus    Xerosis cutis    Medication management       Objective       Physical Exam  Vitals signs and nursing note reviewed  Psychiatric:         Mood and Affect: Mood is depressed  Affect is flat  Speech: Speech is delayed  Behavior: Behavior is slowed and withdrawn  Cognition and Memory: Cognition is impaired       Pertinent Laboratory/Diagnostic Studies:  I have personally reviewed pertinent lab results        Mental Status Evaluation:    Appearance age appropriate, casually dressed   Behavior cooperative, calm   Speech decreased rate   Mood depressed   Affect flat   Thought Processes circumstantial   Associations intact associations   Thought Content no overt delusions   Perceptual Disturbances: no auditory hallucinations, no visual hallucinations   Abnormal Thoughts  Risk Potential Suicidal ideation - None at present  Homicidal ideation - None  Potential for aggression - No   Orientation oriented to person and place   Memory recent memory impaired   Consciousness alert and awake   Attention Span Concentration Span decreased attention span  decreased concentration   Intellect appears to be of average intelligence   Insight poor   Judgement poor   Muscle Strength and  Gait uses wheelchair   Motor activity no abnormal movements   Language no difficulty repeating a phrase, no difficulty writing a sentence, difficulty naming common objects   Fund of Knowledge adequate fund of knowledge regarding past history  adequate fund of knowledge regarding vocabulary   impaired knowledge of current events       Current Medications       Current Outpatient Medications:     acetaminophen (TYLENOL) 325 mg tablet, Take 650 mg by mouth every 6 (six) hours as needed for mild pain, Disp: , Rfl:     acetaminophen (TYLENOL) 650 mg suppository, Insert 1 suppository into the rectum every 6 (six) hours as needed , Disp: , Rfl:     bisacodyl (BISAC-EVAC) 10 mg suppository, Insert 10 mg into the rectum daily, Disp: , Rfl:     bisacodyl (DULCOLAX) 5 mg EC tablet, Take 1 tablet by mouth daily as needed, Disp: , Rfl:     calcium polycarbophil (FIBER-LAX) 625 mg tablet, Take 625 mg by mouth daily, Disp: , Rfl:     colchicine (COLCRYS) 0 6 mg tablet, Take 1 tablet by mouth daily, Disp: , Rfl:     collagenase (SANTYL) ointment, Apply topically daily, Disp: , Rfl:     diclofenac sodium (VOLTAREN) 1 %, Place on the skin daily, Disp: , Rfl:     digoxin (LANOXIN) 0 125 mg tablet, Take 1 tablet by mouth daily, Disp: , Rfl:     Dimethicone-Zinc Oxide-Vit A-D (A & D ZINC OXIDE) CREA, Apply topically, Disp: , Rfl:     DULoxetine (CYMBALTA) 60 mg delayed release capsule, Take 1 capsule by mouth daily, Disp: , Rfl:     ferrous sulfate 325 (65 Fe) mg tablet, Take 1 tablet by mouth daily with breakfast for 30 days, Disp: 30 tablet, Rfl: 0    gabapentin (NEURONTIN) 100 mg capsule, Take 2 capsules by mouth daily  At 9:00 a m , Disp: , Rfl:     gabapentin (NEURONTIN) 300 mg capsule, Take 1 capsule by mouth daily at bedtime, Disp: , Rfl:     hydrOXYzine HCL (ATARAX) 25 mg tablet, Take 25 mg by mouth every 8 (eight) hours as needed for itching itching, Disp: , Rfl:     insulin aspart (NovoLOG) 100 units/mL injection, Inject under the skin 3 (three) times a day before meals  12 units, 10 units, 12 units, Disp: , Rfl:     insulin glargine (LANTUS) 100 units/mL subcutaneous injection, Inject 46 Units under the skin daily at bedtime, Disp: , Rfl:     ketoconazole (NIZORAL) 2 % cream, Apply 1 application topically 2 (two) times a day, Disp: , Rfl:     KETOCONAZOLE, TOPICAL, (NIZORAL A-D) 1 % SHAM, Apply topically, Disp: , Rfl:     magnesium hydroxide (MILK OF MAGNESIA) 400 mg/5 mL oral suspension, Take by mouth daily as needed for constipation, Disp: , Rfl:     magnesium hydroxide (Milk of Magnesia) 400 mg/5 mL oral suspension, Take 30 mL by mouth daily as needed for constipation, Disp: , Rfl:     melatonin 3 mg, Take 2 tablets by mouth daily at bedtime, Disp: , Rfl:     Menthol-Methyl Salicylate (Icy Hot) 42-02 % STCK, Apply topically daily Bilateral knees, Disp: , Rfl:     Menthol-Zinc Oxide (CALMOSEPTINE EX), Apply topically Apply topically to gluteal areas, Disp: , Rfl:     methimazole (TAPAZOLE) 5 mg tablet, Take 0 5 tablets by mouth every other day, Disp: , Rfl:     OLANZapine (ZyPREXA) 5 mg tablet, Take 1 tablet by mouth daily at bedtime, Disp: , Rfl:     OxyCODONE HCl 5 MG TABA, Take 2 5 mg by mouth every 8 (eight) hours as needed, Disp: , Rfl:     polyethylene glycol (MIRALAX) 17 g packet, Take 17 g by mouth daily, Disp: 30 each, Rfl: 6    potassium chloride (Klor-Con) 10 mEq tablet, Take 1 tablet by mouth daily, Disp: , Rfl:     Pramoxine HCl (Sarna Sensitive) 1 % LOTN, Apply topically Bilateral uppper extremities, Disp: , Rfl:     rivaroxaban (Xarelto) 20 mg tablet, Take 1 tablet by mouth daily, Disp: , Rfl:     senna (SENOKOT) 8 6 MG tablet, Take 2 tablets by mouth daily, Disp: , Rfl:     SODIUM PHOSPHATES RE, Insert into the rectum daily as needed, Disp: , Rfl:       Counseling / Coordination of Care  Total floor / unit time spent today 15 minutes  Greater than 50% of total time was spent with the patient and / or family counseling and / or coordination of care       MARIANA Jamison 5/25/2021

## 2021-06-15 ENCOUNTER — NURSING HOME VISIT (OUTPATIENT)
Dept: GERIATRICS | Facility: OTHER | Age: 81
End: 2021-06-15
Payer: MEDICARE

## 2021-06-15 VITALS
DIASTOLIC BLOOD PRESSURE: 62 MMHG | BODY MASS INDEX: 26.58 KG/M2 | HEART RATE: 70 BPM | WEIGHT: 180 LBS | TEMPERATURE: 98.3 F | SYSTOLIC BLOOD PRESSURE: 118 MMHG

## 2021-06-15 DIAGNOSIS — Z71.89 GOALS OF CARE, COUNSELING/DISCUSSION: ICD-10-CM

## 2021-06-15 DIAGNOSIS — F02.81 LATE ONSET ALZHEIMER'S DISEASE WITH BEHAVIORAL DISTURBANCE (HCC): Chronic | ICD-10-CM

## 2021-06-15 DIAGNOSIS — M11.269 CHONDROCALCINOSIS DUE TO DICALCIUM PHOSPHATE CRYSTALS, OF THE KNEE, UNSPECIFIED LATERALITY: ICD-10-CM

## 2021-06-15 DIAGNOSIS — E11.9 TYPE 2 DIABETES MELLITUS WITHOUT COMPLICATION, WITH LONG-TERM CURRENT USE OF INSULIN (HCC): Primary | Chronic | ICD-10-CM

## 2021-06-15 DIAGNOSIS — G62.9 NEUROPATHY: ICD-10-CM

## 2021-06-15 DIAGNOSIS — E05.90 HYPERTHYROIDISM: ICD-10-CM

## 2021-06-15 DIAGNOSIS — G30.1 LATE ONSET ALZHEIMER'S DISEASE WITH BEHAVIORAL DISTURBANCE (HCC): Chronic | ICD-10-CM

## 2021-06-15 DIAGNOSIS — N31.9 NEUROGENIC BLADDER: ICD-10-CM

## 2021-06-15 DIAGNOSIS — I82.409 DEEP VEIN THROMBOSIS (DVT) OF LOWER EXTREMITY, UNSPECIFIED CHRONICITY, UNSPECIFIED LATERALITY, UNSPECIFIED VEIN (HCC): Chronic | ICD-10-CM

## 2021-06-15 DIAGNOSIS — Z79.4 TYPE 2 DIABETES MELLITUS WITHOUT COMPLICATION, WITH LONG-TERM CURRENT USE OF INSULIN (HCC): Primary | Chronic | ICD-10-CM

## 2021-06-15 PROCEDURE — 99309 SBSQ NF CARE MODERATE MDM 30: CPT | Performed by: PHYSICIAN ASSISTANT

## 2021-06-15 NOTE — PROGRESS NOTES
[facility]  [phone]) [address]  Code  32      NAME: Milana Bettencourt  AGE: 78 y o  SEX: female [id number]    DATE OF ENCOUNTER: 6/15/2021    CODE STATUS: comfort care/DNR    Assessment and Plan     Problem List Items Addressed This Visit        Endocrine    Diabetes mellitus ([address] ) - Primary (Chronic)       Lab Results   Component Value Date    HGBA1C 8 3 (H) 01/05/2021       Continue lantus/novolog         Hyperthyroidism     1/2021 TSH 1 96  Continue methimazole            Cardiovascular and Mediastinum    DVT (deep venous thrombosis) (HCC) (Chronic)     Continue xarelto            Nervous and Auditory    Late onset Alzheimer's disease with behavioral disturbance (HCC) (Chronic)     Stable  Continue olanzapine         Neuropathy     Continue gabapentin            Musculoskeletal and Integument    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, of the knee     Continue colchicine            Other    Neurogenic bladder     Has suprapubic cath         Goals of care, counseling/discussion     She is comfort care/DNR  She has order for Do Not Hospitalize               No orders of the defined types were placed in this encounter  Chief Complaint     Chief Complaint   Patient presents with    Geriatric Evaluation     follow up for chronic conditions       History of Present Illness   78year old female resident of [address] [place] [address] being seen today in collaboration with nursing for follow up for chronic conditions  Patient offers no complaints  Nursing has no concerns  The following portions of the patient's history were reviewed and updated as appropriate: allergies, current medications, past family history, past medical history, past social history, past surgical history and problem list     Review of Systems   Review of Systems   Constitutional: Negative  HENT: Negative  Eyes: Negative  Respiratory: Negative  Cardiovascular: Negative  Gastrointestinal: Negative  Endocrine: Negative  Genitourinary:        Suprapubic cath   Musculoskeletal: Positive for arthralgias and gait problem  Skin: Negative  Allergic/Immunologic: Negative  Hematological: Negative  Psychiatric/Behavioral: Negative  Active Problem List     Patient Active Problem List   Diagnosis    Diabetes mellitus ([facility] )    [facility] pulmonary embolism without acute cor pulmonale ([facility]    DVT (deep venous thrombosis) ([facility]    Atrial fibrillation ([facility])    Bipolar disorder ([place] [address] )    Late onset Alzheimer's disease with behavioral disturbance ([place] [address] )    Hyperthyroidism    Debility    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, of the knee    Constipation    Neurogenic bladder    Other insomnia    Ileus ([place] [address] )    MDD (major depressive disorder)    Other chronic pain    Neuropathy    Goals of care, counseling/discussion    Suprapubic catheter ([facility]    Pruritus    Xerosis cutis    Medication management         Objective     /62   Pulse 70   Temp 98 3 °F (36 8 °C)   Wt 81 6 kg (180 lb)   BMI 26 58 kg/m²     Physical Exam  Vitals reviewed  Constitutional:       General: She is not in acute distress  Appearance: She is not ill-appearing or diaphoretic  HENT:      Head: Normocephalic and atraumatic  Nose: Nose normal       Mouth/Throat:      Mouth: Mucous membranes are moist       Pharynx: Oropharynx is clear  Eyes:      Conjunctiva/sclera: Conjunctivae normal       Pupils: Pupils are equal, round, and reactive to light  Cardiovascular:      Rate and Rhythm: Normal rate and regular rhythm  Pulmonary:      Effort: Pulmonary effort is normal  No respiratory distress  Breath sounds: Normal breath sounds  No wheezing  Abdominal:      General: Bowel sounds are normal  There is distension  Palpations: Abdomen is soft  Tenderness:  There is no abdominal tenderness  There is no guarding  Genitourinary:     Comments: Suprapubic cath  Musculoskeletal:         General: No deformity or signs of injury  Cervical back: No tenderness  Skin:     General: Skin is warm and dry  Findings: No bruising or erythema  Comments: Scabbed area on heel followed by wound team   Neurological:      Mental Status: She is alert  Mental status is at baseline  Psychiatric:         Mood and Affect: Mood normal          Behavior: Behavior normal          Pertinent Laboratory/Diagnostic Studies:    5/4/21  Creat 1 07    Current Medications   Medications reviewed and updated in facility chart

## 2021-08-17 ENCOUNTER — NURSING HOME VISIT (OUTPATIENT)
Dept: GERIATRICS | Facility: OTHER | Age: 81
End: 2021-08-17
Payer: MEDICARE

## 2021-08-17 DIAGNOSIS — F31.30 BIPOLAR AFFECTIVE DISORDER, CURRENT EPISODE DEPRESSED, CURRENT EPISODE SEVERITY UNSPECIFIED (HCC): Primary | Chronic | ICD-10-CM

## 2021-08-17 DIAGNOSIS — F33.9 RECURRENT MAJOR DEPRESSIVE DISORDER, REMISSION STATUS UNSPECIFIED (HCC): ICD-10-CM

## 2021-08-17 DIAGNOSIS — F02.81 LATE ONSET ALZHEIMER'S DISEASE WITH BEHAVIORAL DISTURBANCE (HCC): Chronic | ICD-10-CM

## 2021-08-17 DIAGNOSIS — G30.1 LATE ONSET ALZHEIMER'S DISEASE WITH BEHAVIORAL DISTURBANCE (HCC): Chronic | ICD-10-CM

## 2021-08-17 PROCEDURE — 99308 SBSQ NF CARE LOW MDM 20: CPT | Performed by: NURSE PRACTITIONER

## 2021-08-19 ENCOUNTER — NURSING HOME VISIT (OUTPATIENT)
Dept: GERIATRICS | Facility: OTHER | Age: 81
End: 2021-08-19
Payer: MEDICARE

## 2021-08-19 DIAGNOSIS — E11.65 TYPE 2 DIABETES MELLITUS WITH HYPERGLYCEMIA, WITH LONG-TERM CURRENT USE OF INSULIN (HCC): Chronic | ICD-10-CM

## 2021-08-19 DIAGNOSIS — F31.30 BIPOLAR AFFECTIVE DISORDER, CURRENT EPISODE DEPRESSED, CURRENT EPISODE SEVERITY UNSPECIFIED (HCC): Chronic | ICD-10-CM

## 2021-08-19 DIAGNOSIS — Z79.4 TYPE 2 DIABETES MELLITUS WITH HYPERGLYCEMIA, WITH LONG-TERM CURRENT USE OF INSULIN (HCC): Chronic | ICD-10-CM

## 2021-08-19 DIAGNOSIS — Z71.89 GOALS OF CARE, COUNSELING/DISCUSSION: ICD-10-CM

## 2021-08-19 DIAGNOSIS — R53.81 DEBILITY: ICD-10-CM

## 2021-08-19 DIAGNOSIS — Z86.718 HISTORY OF DVT (DEEP VEIN THROMBOSIS): ICD-10-CM

## 2021-08-19 DIAGNOSIS — I48.91 ATRIAL FIBRILLATION, UNSPECIFIED TYPE (HCC): Primary | Chronic | ICD-10-CM

## 2021-08-19 DIAGNOSIS — E05.90 HYPERTHYROIDISM: ICD-10-CM

## 2021-08-19 PROCEDURE — 99309 SBSQ NF CARE MODERATE MDM 30: CPT | Performed by: INTERNAL MEDICINE

## 2021-08-19 NOTE — PROGRESS NOTES
[address]  [address] [place] [address]  Facility: [facility]  32    NAME: Eufemia Orr  AGE: 78 y o  SEX: female    DATE OF ENCOUNTER: 8/19/2021    Code status:  DNR    Assessment and Plan     1  Atrial fibrillation, unspecified type [facility])  Assessment & Plan:  · She is doing well with digoxin for rate control  · She is doing well with rivaroxaban for anticoagulation  · Will continue her on this medication regimen  · Will continue with clinical and periodic laboratory monitoring for change in her condition      2  Debility  Assessment & Plan:  · Secondary to her chronic medical conditions   · She continues to require 24/7 care/support of her ADLs   · I recommend continued care/support of her ADLs as a long-term resident at the nursing facility  · Will continue to monitor for change in her condition      3  Type 2 diabetes mellitus with hyperglycemia, with long-term current use of insulin ([facility])  Assessment & Plan:    · Review of her fingerstick blood sugar log shows fasting hyperglycemia  · Will continue with adjustments of her insulins glargine and aspart   · Will continue with clinical and periodic laboratory monitoring for change in her condition      4  Hyperthyroidism  Assessment & Plan:  · She is doing well with methimazole therapy  · Will continue with clinical and periodic laboratory monitoring for change in her condition      5  History of DVT (deep vein thrombosis)  Assessment & Plan:  · She is doing well with rivaroxaban therapy  · Will continue her on lifelong anticoagulation secondary to decreased mobility and high likelihood of recurrence  · Will continue with clinical and periodic laboratory monitoring for change in her condition      6   Bipolar affective disorder, current episode depressed, current episode severity unspecified [facility])  Assessment & Plan:  · She is following with the psychiatry service for care   · Will continue her on olanzapine and duloxetine   · Will continue her care in collaboration with the psychiatry service  · Will continue with monitoring for change in condition      7  Goals of care, counseling/discussion  Assessment & Plan:  ·  Her resuscitation status is "do not resuscitate"      See my note of April 16, 2021 for further assessment and plan  All medications and routine orders were reviewed and updated as needed  Plan discussed with:   Nursing staff  Chief Complaint     She is seen for a follow-up visit to update the care and treatment of her atrial fibrillation, debility secondary to her chronic medical conditions, insulin-requiring type 2 diabetes mellitus, and hyperthyroidism  History of Present Illness     She is an 64-year-old woman who is seen with nursing staff for a follow-up visit to update the care and treatment of her atrial fibrillation-doing well with digoxin and rivaroxaban, debility secondary to her chronic medical conditions-she continues to require 24/7 care/support of her ADLs, insulin-requiring type 2 diabetes mellitus- fingerstick blood sugar log shows hyperglycemia with current dosages of insulins glargine and aspart, and hypothyroidism-doing well with methimazole  Nursing staff endorses that she is sleeping well and has occasional diarrhea  The following portions of the patient's history were reviewed and updated as appropriate: current medications, past family history, past medical history, past social history, past surgical history and problem list     Allergies: Allergies   Allergen Reactions    Risperdal [Risperidone]        Review of Systems     Review of Systems   Unable to perform ROS: Dementia       Medications and orders     All medications reviewed and updated in Nursing Home EMR  Objective     Vitals:   Monthly vitals:  Weight 191 lb (stable), temperature 98 7° F, pulse 76, blood pressure 122/70, fasting fingerstick blood sugar 299      Physical Exam  Vitals reviewed  Exam conducted with a chaperone present  Constitutional:       General: She is awake  She is not in acute distress  Appearance: She is well-developed  She is not toxic-appearing or diaphoretic  Comments: She appears comfortable, her stated age, and frail  Abdominal:      General: Abdomen is flat  There is distension  Palpations: Abdomen is soft  There is no mass  Tenderness: There is no abdominal tenderness  There is no guarding or rebound  Comments: Mild distension-Chronic   Neurological:      Mental Status: She is alert  Psychiatric:         Behavior: Behavior normal  Behavior is cooperative  Pertinent Laboratory/Diagnostic Studies: The following labs were reviewed please see chart or hospital paperwork for details  August 13, 2021:    BMP:  Sodium 139, potassium 3 5, BUN 9, creatinine 0 88, fasting blood sugar 196, EGFR 61    - Her monthly order summary was reviewed and signed  Treatment plan reviewed with nursing staff      NANY Jansen   8/30/2021 10:24 AM

## 2021-08-24 NOTE — PROGRESS NOTES
MEDICATION MANAGEMENT NOTE        [address]  POS: 28: NF- Long Term, 8 [facility]      Name and Date of Birth:  Anibal [facility] 78 y o  1940 MRN: [id number]    Date of Visit: August 17, 2021    Allergies   Allergen Reactions    Risperdal [Risperidone]      SUBJECTIVE:    Page Beth is seen today for a follow up for Bipolar Disorder, anxiety, psychosis and dementia  She continues to improve gradually since the last visit  She failed a dose reduction of her Zyprexa earlier this year, with some return of psychosis  Currently doing well on Zyprexa 5 mg and Cymbalta 60 mg  She does continue to endorse depressed mood, low energy, poor concentration  Will continue medications the same and follow up in 3 months  She denied any side effects from psychiatric medications  PLAN:    All medications and routine orders were reviewed and updated as needed      Continue current medications:  Medication management every 3 months  Plan discussed with: Nurse    Diagnoses and all orders for this visit:    Bipolar affective disorder, current episode depressed, current episode severity unspecified ([facility] )    Late onset Alzheimer's disease with behavioral disturbance ([facility] )    Recurrent major depressive disorder, remission status unspecified ([facility] )        Current Outpatient Medications on File Prior to Visit   Medication Sig Dispense Refill    acetaminophen (TYLENOL) 325 mg tablet Take 650 mg by mouth every 6 (six) hours as needed for mild pain      acetaminophen (TYLENOL) 650 mg suppository Insert 1 suppository into the rectum every 6 (six) hours as needed       bisacodyl (BISAC-EVAC) 10 mg suppository Insert 10 mg into the rectum daily      bisacodyl (DULCOLAX) 5 mg EC tablet Take 1 tablet by mouth daily as needed      calcium polycarbophil (FIBER-LAX) 625 mg tablet Take 625 mg by mouth daily      colchicine (COLCRYS) 0 6 mg tablet Take 1 tablet by mouth daily      collagenase (SANTYL) ointment Apply topically daily      diclofenac sodium (VOLTAREN) 1 % Place on the skin daily      digoxin (LANOXIN) 0 125 mg tablet Take 1 tablet by mouth daily      Dimethicone-Zinc Oxide-Vit A-D (A & D ZINC OXIDE) CREA Apply topically      DULoxetine (CYMBALTA) 60 mg delayed release capsule Take 1 capsule by mouth daily      ferrous sulfate 325 (65 Fe) mg tablet Take 1 tablet by mouth daily with breakfast for 30 days 30 tablet 0    gabapentin (NEURONTIN) 100 mg capsule Take 2 capsules by mouth daily  At 9:00 a m       gabapentin (NEURONTIN) 300 mg capsule Take 1 capsule by mouth daily at bedtime      hydrOXYzine HCL (ATARAX) 25 mg tablet Take 25 mg by mouth every 8 (eight) hours as needed for itching itching      insulin aspart (NovoLOG) 100 units/mL injection Inject under the skin 3 (three) times a day before meals  12 units, 10 units, 12 units      insulin glargine (LANTUS) 100 units/mL subcutaneous injection Inject 46 Units under the skin daily at bedtime      ketoconazole (NIZORAL) 2 % cream Apply 1 application topically 2 (two) times a day      KETOCONAZOLE, TOPICAL, (NIZORAL A-D) 1 % SHAM Apply topically      magnesium hydroxide (MILK OF MAGNESIA) 400 mg/5 mL oral suspension Take by mouth daily as needed for constipation      magnesium hydroxide (Milk of Magnesia) 400 mg/5 mL oral suspension Take 30 mL by mouth daily as needed for constipation      melatonin 3 mg Take 2 tablets by mouth daily at bedtime      Menthol-Methyl Salicylate (Icy Hot) 25-20 % STCK Apply topically daily Bilateral knees      Menthol-Zinc Oxide (CALMOSEPTINE EX) Apply topically Apply topically to gluteal areas      methimazole (TAPAZOLE) 5 mg tablet Take 0 5 tablets by mouth every other day      OLANZapine (ZyPREXA) 5 mg tablet Take 1 tablet by mouth daily at bedtime      OxyCODONE HCl 5 MG TABA Take 2 5 mg by mouth every 8 (eight) hours as needed      polyethylene glycol (MIRALAX) 17 g packet Take 17 g by mouth daily 30 each 6    potassium chloride (Klor-Con) 10 mEq tablet Take 1 tablet by mouth daily      Pramoxine HCl (Sarna Sensitive) 1 % LOTN Apply topically Bilateral uppper extremities      rivaroxaban (Xarelto) 20 mg tablet Take 1 tablet by mouth daily      senna (SENOKOT) 8 6 MG tablet Take 2 tablets by mouth daily      SODIUM PHOSPHATES RE Insert into the rectum daily as needed       No current facility-administered medications on file prior to visit  Psychotherapy Provided:     Individual psychotherapy provided: Yes  Supportive counseling provided  Reassurance and supportive therapy provided  HPI ROS Appetite Changes and Sleep:     She reports fluctuating sleep pattern, fluctuating appetite, fluctuating energy levels   Patient denies suicidal or homicidal ideation    Review Of Systems:   all other systems are negative         Mental Status Evaluation:    Appearance:  age appropriate   Behavior:  cooperative   Speech:  increased latency of response   Mood:  depressed   Affect:  flat   Thought Process:  circumstantial   Associations: concrete associations   Thought Content:  no overt delusions   Perceptual Disturbances: none   Risk Potential: Suicidal ideation - None  Homicidal ideation - None  Potential for aggression - No   Sensorium:  oriented to person and place   Memory:  recent memory impaired   Consciousness:  alert and awake   Attention: decreased concentration and decreased attention span   Insight:  limited   Judgment: impaired   Gait/Station: in wheelchair   Motor Activity: no abnormal movements     Past Psychiatric History Update:     Inpatient Psychiatric Admission Since Last Encounter:   no  Suicide Attempt Or Self Mutilation Since Last Encounter:   no  Incidence of Violent Behavior Since Last Encounter:   no    Traumatic History Update:     New Onset of Abuse Since Last Encounter:   no  Traumatic Events Since Last Encounter:   no    Past Medical History:    Past Medical History:   Diagnosis Date    Acute thyroiditis     Arthritis     Ataxia     Atrial fibrillation (HCC)     Bipolar disorder (HCC)     Bipolar disorder, unspecified ([place] [address] )     Coronary artery disease     Diabetes mellitus ([place] [address] )     Difficulty in walking     [facility]) [facility]     Headache     Hyperlipidemia     Macular degeneration     Major depressive disorder     Muscle weakness     Psychiatric disorder     depression    Pulmonary embolism ([facility])     Weakness      Past Medical History Pertinent Negatives:   Diagnosis Date Noted    Asthma 02/13/2016    CHF (congestive heart failure) ([place] [address] ) 02/13/2016    COPD (chronic obstructive pulmonary disease) ([place] [address] ) 02/13/2016    History of transfusion 02/13/2016    Hypertension 02/13/2016    Renal disorder 02/13/2016    Stroke ([place] [address] ) 02/13/2016    TIA (transient ischemic attack) 02/13/2016     Past Surgical History:   Procedure Laterality Date    EYE SURGERY      HYSTERECTOMY       Allergies   Allergen Reactions    Risperdal [Risperidone]      Substance Abuse History:    Social History     Substance and Sexual Activity   Alcohol Use No     Social History     Substance and Sexual Activity   Drug Use No     Social History:    Changes since last encounter:  no  Family Psychiatric History:     Family History   Problem Relation Age of Onset    Coronary artery disease Father     Diabetes Sister     Diabetes Brother      History Review: The following portions of the patient's history were reviewed and updated as appropriate: allergies, current medications, past family history, past medical history, past social history, past surgical history and problem list     OBJECTIVE:     Vital signs in last 24 hours: Vital signs and nursing notes reviewed in facility chart  Laboratory Results: Lab results reviewed in facility chart        Medications Risks/Benefits:      Risks, Benefits And Possible Side Effects Of Medications:    Discussed risks and benefits of treatment with patient including :N/A and unable to comprehend     Controlled Medication Discussion:     Not applicable - controlled prescriptions are not prescribed by this practice    Evaluation of Psychotropic Drugs for possible gradual dose reductions    Psychotropic medications have been reviewed  Patient continues with symptoms of bipolar disorder with depressed mood as noted above  Any/or further dose reductions at this time would be clinically contraindicated, as it would be likely to cause worsening of symptoms          MARIANA Latham

## 2021-08-30 NOTE — ASSESSMENT & PLAN NOTE
· She is doing well with rivaroxaban therapy  · Will continue her on lifelong anticoagulation secondary to decreased mobility and high likelihood of recurrence  · Will continue with clinical and periodic laboratory monitoring for change in her condition

## 2021-08-30 NOTE — ASSESSMENT & PLAN NOTE
· She is doing well with methimazole therapy  · Will continue with clinical and periodic laboratory monitoring for change in her condition

## 2021-08-30 NOTE — ASSESSMENT & PLAN NOTE
· She is following with the psychiatry service for care   · Will continue her on olanzapine and duloxetine   · Will continue her care in collaboration with the psychiatry service  · Will continue with monitoring for change in condition

## 2021-08-30 NOTE — ASSESSMENT & PLAN NOTE
· She is doing well with digoxin for rate control  · She is doing well with rivaroxaban for anticoagulation  · Will continue her on this medication regimen  · Will continue with clinical and periodic laboratory monitoring for change in her condition

## 2021-08-30 NOTE — ASSESSMENT & PLAN NOTE
· Review of her fingerstick blood sugar log shows fasting hyperglycemia  · Will continue with adjustments of her insulins glargine and aspart   · Will continue with clinical and periodic laboratory monitoring for change in her condition

## 2021-09-09 ENCOUNTER — NURSING HOME VISIT (OUTPATIENT)
Dept: GERIATRICS | Facility: OTHER | Age: 81
End: 2021-09-09
Payer: MEDICARE

## 2021-09-09 VITALS
WEIGHT: 191 LBS | BODY MASS INDEX: 28.21 KG/M2 | DIASTOLIC BLOOD PRESSURE: 56 MMHG | SYSTOLIC BLOOD PRESSURE: 131 MMHG | TEMPERATURE: 98.7 F | HEART RATE: 72 BPM

## 2021-09-09 DIAGNOSIS — N39.0 URINARY TRACT INFECTION WITHOUT HEMATURIA, SITE UNSPECIFIED: Primary | ICD-10-CM

## 2021-09-09 PROCEDURE — 99308 SBSQ NF CARE LOW MDM 20: CPT | Performed by: PHYSICIAN ASSISTANT

## 2021-09-09 NOTE — PROGRESS NOTES
[facility]  [phone]) [address]  Code  32      NAME: Chuck Quinones  AGE: 78 y o  SEX: female [id number]    DATE OF ENCOUNTER: 9/9/2021    CODE STATUS: DNR    Assessment and Plan     Problem List Items Addressed This Visit        Genitourinary    UTI (urinary tract infection) - Primary     Patient with suprapubic cath  Had one episode of vomiting reported by nursing yesterday  Afebrile  Eating and drinking well otherwise  Had bloodwork and UA done today  WBC slightly elevated  UA positive nitrites and +4 bacteria  Will start keflex                 No orders of the defined types were placed in this encounter  Chief Complaint     Chief Complaint   Patient presents with    Geriatric Evaluation     vomiting       History of Present Illness   78year old female resident of [address] [place] [address] being seen today in collaboration with nursing after an episode of vomiting  She has a suprapubic catheter  She is known to get occasional UTI's  She is afebrile          The following portions of the patient's history were reviewed and updated as appropriate: allergies, current medications, past family history, past medical history, past social history, past surgical history and problem list     Review of Systems   Review of Systems   Unable to perform ROS: Dementia          Active Problem List     Patient Active Problem List   Diagnosis    Diabetes mellitus ([address] )    Chronic pulmonary embolism without acute cor pulmonale ([address] )    History of DVT (deep vein thrombosis)    Atrial fibrillation ([address] )    Bipolar disorder ([address] )    Late onset Alzheimer's disease with behavioral disturbance ([address] )    Hyperthyroidism    Debility    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, of the knee    Constipation    Neurogenic bladder    Other insomnia    Ileus ([address] )    MDD (major depressive disorder)    Other chronic pain    Neuropathy    Goals of care, counseling/discussion    Suprapubic catheter (HCC)    Pruritus    Xerosis cutis    Medication management    UTI (urinary tract infection)         Objective     /56   Pulse 72   Temp 98 7 °F (37 1 °C)   Wt 86 6 kg (191 lb)   BMI 28 21 kg/m²     Physical Exam  Vitals reviewed  Constitutional:       General: She is not in acute distress  Appearance: She is not ill-appearing or diaphoretic  Cardiovascular:      Rate and Rhythm: Normal rate  Pulmonary:      Effort: Pulmonary effort is normal  No respiratory distress  Skin:     General: Skin is warm and dry  Neurological:      Mental Status: She is alert  Mental status is at baseline  Pertinent Laboratory/Diagnostic Studies:    reviewed    Current Medications   Medications reviewed and updated in facility chart

## 2021-09-09 NOTE — ASSESSMENT & PLAN NOTE
Patient with suprapubic cath  Had one episode of vomiting reported by nursing yesterday  Afebrile  Eating and drinking well otherwise  Had bloodwork and UA done today  WBC slightly elevated  UA positive nitrites and +4 bacteria  Will start keflex

## 2021-09-13 ENCOUNTER — NURSING HOME VISIT (OUTPATIENT)
Dept: GERIATRICS | Facility: OTHER | Age: 81
End: 2021-09-13
Payer: MEDICARE

## 2021-09-13 DIAGNOSIS — R14.0 ABDOMINAL DISTENSION: Primary | ICD-10-CM

## 2021-09-13 PROCEDURE — 99308 SBSQ NF CARE LOW MDM 20: CPT | Performed by: PHYSICIAN ASSISTANT

## 2021-09-14 ENCOUNTER — NURSING HOME VISIT (OUTPATIENT)
Dept: GERIATRICS | Facility: OTHER | Age: 81
End: 2021-09-14
Payer: MEDICARE

## 2021-09-14 VITALS
DIASTOLIC BLOOD PRESSURE: 67 MMHG | SYSTOLIC BLOOD PRESSURE: 115 MMHG | TEMPERATURE: 97.5 F | HEART RATE: 81 BPM | WEIGHT: 184 LBS | BODY MASS INDEX: 27.17 KG/M2

## 2021-09-14 VITALS
BODY MASS INDEX: 27.17 KG/M2 | DIASTOLIC BLOOD PRESSURE: 62 MMHG | SYSTOLIC BLOOD PRESSURE: 116 MMHG | HEART RATE: 87 BPM | TEMPERATURE: 98 F | WEIGHT: 184 LBS

## 2021-09-14 DIAGNOSIS — K56.41 FECAL IMPACTION (HCC): Primary | ICD-10-CM

## 2021-09-14 PROBLEM — R14.0 ABDOMINAL DISTENSION: Status: ACTIVE | Noted: 2021-09-14

## 2021-09-14 PROCEDURE — 99308 SBSQ NF CARE LOW MDM 20: CPT | Performed by: PHYSICIAN ASSISTANT

## 2021-09-14 NOTE — PROGRESS NOTES
[facility]  [phone]) [address]  Code  32      NAME: Octavia Akbar  AGE: 78 y o  SEX: female [id number]    DATE OF ENCOUNTER: 9/14/2021    CODE STATUS: DNR/DNH    Assessment and Plan     Problem List Items Addressed This Visit        Digestive    Fecal impaction ([facility] ) - Primary     Xray report:  Significant stool burden in rectosigmoid colon suggesting fecal impaction  Possible colonic ileus  Will order fleets enema  On exam distension improved today  Continues to have liquid stool which is her baseline               No orders of the defined types were placed in this encounter  Chief Complaint     Chief Complaint   Patient presents with    Geriatric Evaluation     follow up for abdominal distension       History of Present Illness   78year old female resident of [address] [place] [address] being seen today in collaboration with nursing for follow up on xray xray results  Abdominal distention slightly improved today  She continues to have multiple medium to large liquid stools every day  She denies nausea  She only had 1 episode of vomiting which was last week       The following portions of the patient's history were reviewed and updated as appropriate: allergies, current medications, past family history, past medical history, past social history, past surgical history and problem list     Review of Systems   Review of Systems   Constitutional: Negative  Respiratory: Negative  Cardiovascular: Negative  Gastrointestinal: Positive for abdominal distention and diarrhea            Active Problem List     Patient Active Problem List   Diagnosis    Diabetes mellitus ([facility] )    Chronic pulmonary embolism without acute cor pulmonale (HCC)    History of DVT (deep vein thrombosis)    Atrial fibrillation (HCC)    B[facility] disorder ([facility] )    Late onset Alzheimer's disease with behavioral disturbance ([facility] )    Hyperthyroidism    Debility    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, of the knee    Constipation    Neurogenic bladder    Other insomnia    Ileus (HCC)    MDD (major depressive disorder)    Other chronic pain    Neuropathy    Goals of care, counseling/discussion    [facility]rapub[facility]    Pruritus    Xerosis cutis    Medication management    UTI (urinary tract infection)    Abdominal distension    Fecal impaction ([facility])         Objective     /67   Pulse 81   Temp 97 5 °F (36 4 °C)   Wt 83 5 kg (184 lb)   BMI 27 17 kg/m²     Physical Exam  Vitals reviewed  Constitutional:       General: She is not in acute distress  Appearance: She is not ill-appearing or diaphoretic  HENT:      Head: Normocephalic  Pulmonary:      Effort: Pulmonary effort is normal  No respiratory distress  Abdominal:      Comments: Still with distention but improved from yesterday, not as firm   Neurological:      Mental Status: She is alert  Mental status is at baseline  Pertinent Laboratory/Diagnostic Studies:    Reviewed xray, see plan    Current Medications   Medications reviewed and updated in facility chart

## 2021-09-14 NOTE — ASSESSMENT & PLAN NOTE
Xray report:  Significant stool burden in rectosigmoid colon suggesting fecal impaction   Possible colonic ileus  Will order fleets enema  On exam distension improved today  Continues to have liquid stool which is her baseline

## 2021-09-14 NOTE — PROGRESS NOTES
[facility]  [phone]) [address]  Code  32      NAME: Annie Vazquez  AGE: 78 y o  SEX: female [id number]    DATE OF ENCOUNTER: 9/13/21    CODE STATUS: DNR/DNH    Assessment and Plan     Problem List Items Addressed This Visit        Other    Abdominal distension - Primary     Patient with increased abdominal distension for 1 day  Has been having frequent liquid stools  Has hx of ileus in the past  Will order abdominal xray, obstruction series, r/o ileus  Spoke with daughter on the phone  Patient is DNR/DNH  Daughter coming for in person visit later this evening  Staff to update family with xray results when available               No orders of the defined types were placed in this encounter  Chief Complaint     Chief Complaint   Patient presents with    Geriatric Evaluation     abdominal distension       History of Present Illness   78year old female resident of [address] [place] [address] being seen today in collaboration with nursing for increased abdominal distention  She was diagnosed with a UTI last week and was started on an antibiotic  She does have a suprapubic cath which is draining well  She put out 400cc of urine this AM and it continued to drain well  Nursing did flush the catheter to make sure there was no blockage causing abdominal distention and it flushed well  She has a hx of ileus  She always has very loose stools  She is reporting frequent large liquid BM's the past couple days  The following portions of the patient's history were reviewed and updated as appropriate: allergies, current medications, past family history, past medical history, past social history, past surgical history and problem list     Review of Systems   Review of Systems   Constitutional: Negative  Respiratory: Negative  Cardiovascular: Negative  Gastrointestinal: Positive for abdominal distention and diarrhea  Genitourinary:        Suprapubic cath          Active Problem List     Patient Active Problem List   Diagnosis    Diabetes mellitus ([facility] )    Chronic pulmonary embolism without acute cor pulmonale ([facility])    History of DVT (deep vein thrombosis)    Atrial fibrillation ([facility])    Bipolar disorder ([facility])    Late onset [facility]'s [facility] with [facility]    Hyperthyroidism    Debility    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, of the knee    Constipation    Neurogenic bladder    Other insomnia    Ileus ([facility] )    MDD (major depressive disorder)    Other chronic pain    Neuropathy    Goals of care, counseling/discussion    Suprapubic catheter ([facility])    Pruritus    Xerosis cutis    Medication management    UTI (urinary tract infection)    Abdominal distension         Objective     /62   Pulse 87   Temp 98 °F (36 7 °C)   Wt 83 5 kg (184 lb)   BMI 27 17 kg/m²     Physical Exam  Vitals reviewed  Constitutional:       General: She is not in acute distress  Appearance: She is not ill-appearing or diaphoretic  HENT:      Head: Normocephalic and atraumatic  Cardiovascular:      Rate and Rhythm: Normal rate  Pulmonary:      Effort: Pulmonary effort is normal  No respiratory distress  Abdominal:      General: There is distension  Tenderness: There is abdominal tenderness  Comments: Firm and distended   Skin:     General: Skin is warm and dry  Findings: No bruising or erythema  Neurological:      Mental Status: She is alert  Mental status is at baseline  Pertinent Laboratory/Diagnostic Studies:    Xray pending    Current Medications   Medications reviewed and updated in facility chart

## 2021-09-14 NOTE — ASSESSMENT & PLAN NOTE
Patient with increased abdominal distension for 1 day  Has been having frequent liquid stools  Has hx of ileus in the past  Will order abdominal xray, obstruction series, r/o ileus  Spoke with daughter on the phone  Patient is DNR/DNH  Daughter coming for in person visit later this evening   Staff to update family with xray results when available

## 2021-10-18 ENCOUNTER — NURSING HOME VISIT (OUTPATIENT)
Dept: GERIATRICS | Facility: OTHER | Age: 81
End: 2021-10-18
Payer: MEDICARE

## 2021-10-18 DIAGNOSIS — Z79.4 TYPE 2 DIABETES MELLITUS WITH HYPERGLYCEMIA, WITH LONG-TERM CURRENT USE OF INSULIN (HCC): Primary | Chronic | ICD-10-CM

## 2021-10-18 DIAGNOSIS — I48.91 ATRIAL FIBRILLATION, UNSPECIFIED TYPE (HCC): Chronic | ICD-10-CM

## 2021-10-18 DIAGNOSIS — N31.9 NEUROGENIC BLADDER: ICD-10-CM

## 2021-10-18 DIAGNOSIS — E05.90 HYPERTHYROIDISM: ICD-10-CM

## 2021-10-18 DIAGNOSIS — E11.65 TYPE 2 DIABETES MELLITUS WITH HYPERGLYCEMIA, WITH LONG-TERM CURRENT USE OF INSULIN (HCC): Primary | Chronic | ICD-10-CM

## 2021-10-18 DIAGNOSIS — G62.9 NEUROPATHY: ICD-10-CM

## 2021-10-18 PROCEDURE — 99309 SBSQ NF CARE MODERATE MDM 30: CPT | Performed by: PHYSICIAN ASSISTANT

## 2021-10-19 VITALS
BODY MASS INDEX: 27.17 KG/M2 | TEMPERATURE: 98 F | SYSTOLIC BLOOD PRESSURE: 128 MMHG | WEIGHT: 184 LBS | HEART RATE: 70 BPM | DIASTOLIC BLOOD PRESSURE: 64 MMHG

## 2021-12-14 ENCOUNTER — NURSING HOME VISIT (OUTPATIENT)
Dept: GERIATRICS | Facility: OTHER | Age: 81
End: 2021-12-14
Payer: MEDICARE

## 2021-12-14 DIAGNOSIS — F31.30 BIPOLAR AFFECTIVE DISORDER, CURRENT EPISODE DEPRESSED, CURRENT EPISODE SEVERITY UNSPECIFIED (HCC): Primary | Chronic | ICD-10-CM

## 2021-12-14 DIAGNOSIS — F02.81 LATE ONSET ALZHEIMER'S DISEASE WITH BEHAVIORAL DISTURBANCE (HCC): Chronic | ICD-10-CM

## 2021-12-14 DIAGNOSIS — G30.1 LATE ONSET ALZHEIMER'S DISEASE WITH BEHAVIORAL DISTURBANCE (HCC): Chronic | ICD-10-CM

## 2021-12-14 PROCEDURE — 99308 SBSQ NF CARE LOW MDM 20: CPT | Performed by: NURSE PRACTITIONER

## 2021-12-22 ENCOUNTER — NURSING HOME VISIT (OUTPATIENT)
Dept: GERIATRICS | Facility: OTHER | Age: 81
End: 2021-12-22
Payer: MEDICARE

## 2021-12-22 DIAGNOSIS — R54 FRAILTY SYNDROME IN GERIATRIC PATIENT: ICD-10-CM

## 2021-12-22 DIAGNOSIS — R53.81 DEBILITY: ICD-10-CM

## 2021-12-22 DIAGNOSIS — Z79.4 TYPE 2 DIABETES MELLITUS WITHOUT COMPLICATION, WITH LONG-TERM CURRENT USE OF INSULIN (HCC): Primary | Chronic | ICD-10-CM

## 2021-12-22 DIAGNOSIS — N31.9 NEUROGENIC BLADDER: ICD-10-CM

## 2021-12-22 DIAGNOSIS — F02.81 LATE ONSET ALZHEIMER'S DISEASE WITH BEHAVIORAL DISTURBANCE (HCC): Chronic | ICD-10-CM

## 2021-12-22 DIAGNOSIS — G30.1 LATE ONSET ALZHEIMER'S DISEASE WITH BEHAVIORAL DISTURBANCE (HCC): Chronic | ICD-10-CM

## 2021-12-22 DIAGNOSIS — Z66 DNR (DO NOT RESUSCITATE): ICD-10-CM

## 2021-12-22 DIAGNOSIS — I48.91 ATRIAL FIBRILLATION, UNSPECIFIED TYPE (HCC): Chronic | ICD-10-CM

## 2021-12-22 DIAGNOSIS — E05.90 HYPERTHYROIDISM: ICD-10-CM

## 2021-12-22 DIAGNOSIS — E11.9 TYPE 2 DIABETES MELLITUS WITHOUT COMPLICATION, WITH LONG-TERM CURRENT USE OF INSULIN (HCC): Primary | Chronic | ICD-10-CM

## 2021-12-22 DIAGNOSIS — Z86.718 HISTORY OF DVT (DEEP VEIN THROMBOSIS): ICD-10-CM

## 2021-12-22 DIAGNOSIS — J02.9 SORE THROAT: ICD-10-CM

## 2021-12-22 DIAGNOSIS — Z93.59 SUPRAPUBIC CATHETER (HCC): ICD-10-CM

## 2021-12-22 PROCEDURE — 99309 SBSQ NF CARE MODERATE MDM 30: CPT | Performed by: INTERNAL MEDICINE

## 2021-12-23 NOTE — PROGRESS NOTES
[address]  [address]  Facility: [facility]  32  Follow-up visit    NAME: Pee Masters  AGE: 78 y o  SEX: female    DATE OF ENCOUNTER: 12/22/2021    Code status:  DNR    Assessment and Plan     1  Type 2 diabetes mellitus without complication, with long-term current use of insulin ([facility])  Assessment & Plan:  · After review of her fingerstick blood sugar log, I will lower the dosage of her insulin glargine to 62 units nightly at bedtime and lower the dosage of her insulin aspart from 15 units 3 times daily with meals to 12 units 3 times daily with meals to avoid hypoglycemia  · Will continue with clinical and periodic laboratory monitoring for change in her condition      2  Debility  Assessment & Plan:  · Secondary to her acute on chronic medical conditions   · She continues to require 24/7 care/support of her ADLs  · I recommend continued care/support of her ADLs as a long-term resident at the nursing facility   · Will continue to monitor for change in her condition      3  Sore throat  Assessment & Plan:  · Her exam is normal   · Discussed with her and her daughter   · Will continue with monitoring to resolution      4  Atrial fibrillation, unspecified type [facility])  Assessment & Plan:  · She is doing well with digoxin for rate control  · She is doing well with rivaroxaban for anticoagulation   · Will continue her on this medication regimen   · Will continue with clinical and periodic laboratory monitoring for change in her condition      5  History of DVT (deep vein thrombosis)  Assessment & Plan:  · She is doing well with rivaroxaban   · Will continue with lifelong therapy secondary to high likelihood of recurrence with decreased mobility   · Will continue with clinical and periodic laboratory monitoring for change in her condition      6   Hyperthyroidism  Assessment & Plan:  · She is doing well with methimazole  · Will continue with clinical and periodic laboratory monitoring for change in her condition      7  Late onset Alzheimer's disease with behavioral disturbance [facility])  Assessment & Plan:  · She is doing well with care/support as a long-term resident at the nursing facility   · Will continue to provide a safe, secure, structured, and supportive environment at the nursing facility   · Will continue her care in collaboration with the psychiatry service   · Will continue with monitoring for change in her condition      8  Frailty syndrome in geriatric patient  Assessment & Plan:  · Staff endorses that she continues to require assistance with her ADLs  · She is level 7 on the clinical frailty scale consistent with being severely frail   · Will continue with care/support of her ADLs as a long-term resident at the nursing facility   · Will continue to monitor for change in her condition      9  Neurogenic bladder  Assessment & Plan:  · She is doing well status post suprapubic catheter placement      10  Suprapubic catheter ([address] )    11  DNR (do not resuscitate)    See my note of August 19, 2021 for further assessment and plan  I have asked for a CMP, CBC with diff, TSH with reflex to free T4, and hemoglobin A1c be performed to assist in her medical care  All medications and routine orders were reviewed and updated as needed  Plan discussed with:  Patient, daughter, and nursing staff  Chief Complaint     She is seen for a follow-up visit to update the care and treatment of her insulin-requiring type 2 diabetes mellitus, debility secondary to her chronic medical conditions, atrial fibrillation, and hyperthyroidism      History of Present Illness     She is an 49-year-old woman who is seen with nursing staff and her daughter for a follow-up visit to update the care and treatment of her insulin-requiring type 2 diabetes mellitus-doing well with insulins glargine and aspart, debility secondary to her chronic medical conditions-she continues to require 24/7 care/support of her ADLs, atrial fibrillation-doing well with digoxin and rivaroxaban, hyperthyroidism-doing well with methimazole  She complains of a sore throat and a tender gland on the left side of her neck  She started with symptoms today  She denies a cough, chest pain, and shortness of breath  Nursing staff endorses that she has a picky eater, is sleeping well, and is having no difficulty with constipation  She requires assistance with her ADLs  Nursing staff endorses that she continues with hallucinations and delusions, but is easily reassured  She is following with the psychiatry service  The following portions of the patient's history were reviewed and updated as appropriate: current medications, past family history, past medical history, past social history, past surgical history and problem list     Allergies: Allergies   Allergen Reactions    Risperdal [Risperidone]        Review of Systems     Review of Systems   Unable to perform ROS: Psychiatric disorder     See HPI  Medications and orders     All medications reviewed and updated in Nursing Home EMR  Objective     Vitals:  Monthly vitals:  Weight 181 2 lb (stable), pulse 72, blood pressure 132/78, fasting fingerstick blood sugar 164  Physical Exam  Vitals reviewed  Exam conducted with a chaperone present  Constitutional:       General: She is awake  She is not in acute distress  Appearance: She is well-developed  She is not ill-appearing, toxic-appearing or diaphoretic  Comments:  Appears comfortable, stated age, and frail  HENT:      Mouth/Throat:      Mouth: Mucous membranes are moist       Pharynx: Oropharynx is clear  No oropharyngeal exudate or posterior oropharyngeal erythema  Cardiovascular:      Rate and Rhythm: Normal rate and regular rhythm  Heart sounds: Normal heart sounds  No murmur heard  No friction rub  No gallop  Comments:  There is no pretibial edema, bilaterally  Pulmonary:      Effort: Pulmonary effort is normal  No respiratory distress  Breath sounds: Normal breath sounds  No stridor  No wheezing, rhonchi or rales  Abdominal:      General: Abdomen is flat  There is no distension  Palpations: Abdomen is soft  There is no mass  Tenderness: There is no abdominal tenderness  There is no guarding or rebound  Neurological:      Mental Status: She is alert  Psychiatric:         Behavior: Behavior is cooperative  - Her monthly order summary was reviewed and signed      NANY Fraire   1/2/2022 10:23 PM

## 2022-01-02 PROBLEM — J02.9 SORE THROAT: Status: ACTIVE | Noted: 2022-01-02

## 2022-01-02 PROBLEM — R54 FRAILTY SYNDROME IN GERIATRIC PATIENT: Status: ACTIVE | Noted: 2022-01-02

## 2022-01-02 PROBLEM — Z66 DNR (DO NOT RESUSCITATE): Status: ACTIVE | Noted: 2022-01-02

## 2022-01-02 PROBLEM — N39.0 UTI (URINARY TRACT INFECTION): Status: RESOLVED | Noted: 2021-09-09 | Resolved: 2022-01-02

## 2022-01-03 NOTE — ASSESSMENT & PLAN NOTE
· She is doing well with rivaroxaban   · Will continue with lifelong therapy secondary to high likelihood of recurrence with decreased mobility   · Will continue with clinical and periodic laboratory monitoring for change in her condition

## 2022-01-03 NOTE — ASSESSMENT & PLAN NOTE
· After review of her fingerstick blood sugar log, I will lower the dosage of her insulin glargine to 62 units nightly at bedtime and lower the dosage of her insulin aspart from 15 units 3 times daily with meals to 12 units 3 times daily with meals to avoid hypoglycemia  · Will continue with clinical and periodic laboratory monitoring for change in her condition

## 2022-01-03 NOTE — ASSESSMENT & PLAN NOTE
· Staff endorses that she continues to require assistance with her ADLs  · She is level 7 on the clinical frailty scale consistent with being severely frail   · Will continue with care/support of her ADLs as a long-term resident at the nursing facility   · Will continue to monitor for change in her condition

## 2022-01-03 NOTE — ASSESSMENT & PLAN NOTE
· She is doing well with methimazole  · Will continue with clinical and periodic laboratory monitoring for change in her condition

## 2022-01-03 NOTE — ASSESSMENT & PLAN NOTE
· Her exam is normal   · Discussed with her and her daughter   · Will continue with monitoring to resolution

## 2022-01-03 NOTE — ASSESSMENT & PLAN NOTE
· Secondary to her acute on chronic medical conditions   · She continues to require 24/7 care/support of her ADLs  · I recommend continued care/support of her ADLs as a long-term resident at the nursing facility   · Will continue to monitor for change in her condition

## 2022-01-03 NOTE — ASSESSMENT & PLAN NOTE
· She is doing well with care/support as a long-term resident at the nursing facility   · Will continue to provide a safe, secure, structured, and supportive environment at the nursing facility   · Will continue her care in collaboration with the psychiatry service   · Will continue with monitoring for change in her condition

## 2022-01-14 NOTE — PROGRESS NOTES
MEDICATION MANAGEMENT NOTE        [address] [facility]  POS: 28: NF- Long Term, 8 [facility]      Name and Date of Birth:  Anita Plaza 78 y o  1940 MRN: [id number]    Date of Visit: December 14, 2021    Allergies   Allergen Reactions    Risperdal [Risperidone]      SUBJECTIVE:    Lizzie Rizzo is seen today for a follow up for Bipolar Disorder and dementia  She continues to experience on and off symptoms since the last visit  Currently doing well on Zyprexa 5 mg and Cymbalta 60 mg  She does continue to endorse depressed mood, low energy, poor concentration  Will continue medications the same and follow up in 3 months  She denies any side effects from current psychiatric medications  PLAN:    All medications and routine orders were reviewed and updated as needed  Continue current medications:  Medication management every 3 months  Plan discussed with: Nurse    Diagnoses and all orders for this visit:    Bipolar affective disorder, current episode depressed, current episode severity unspecified ([facility] )    Late onset Alzheimer's disease with behavioral disturbance ([facility] )        Current Medications  Medications reviewed and updated in facility chart  Psychotherapy Provided:     Individual psychotherapy provided: Yes  Supportive counseling provided  Reassurance and supportive therapy provided  HPI ROS Appetite Changes and Sleep:     She reports fluctuating sleep pattern, fluctuating appetite, fluctuating energy levels   Denies homicidal ideation, denies suicidal ideation    Review Of Systems:   all other systems are negative         Mental Status Evaluation:    Appearance:  age appropriate   Behavior:  cooperative   Speech:  decreased rate   Mood:  dysphoric   Affect:  blunted   Thought Process:  concrete   Associations: concrete associations   Thought Content:  no overt delusions   Perceptual Disturbances: none   Risk Potential: Suicidal ideation - None  Homicidal ideation - None  Potential for aggression - No   Sensorium:  oriented to person and place   Memory:  remote memory mildly impaired   Consciousness:  alert and awake   Attention: decreased concentration and decreased attention span   Insight:  limited   Judgment: impaired   Gait/Station: in wheelchair   Motor Activity: no abnormal movements     Past Psychiatric History Update:     Inpatient Psychiatric Admission Since Last Encounter:   no  Suicide Attempt Or Self Mutilation Since Last Encounter:   no  Incidence of Violent Behavior Since Last Encounter:   no    Traumatic History Update:     New Onset of Abuse Since Last Encounter:   no  Traumatic Events Since Last Encounter:   no    Social History:    Changes since last encounter:  no    History Review: The following portions of the patient's history were reviewed and updated as appropriate: allergies, current medications, past family history, past medical history, past social history, past surgical history and problem list     OBJECTIVE:     Vital signs in last 24 hours: Vital signs and nursing notes reviewed in facility chart  Laboratory Results: Lab results reviewed in facility chart  Medications Risks/Benefits:      Risks, Benefits And Possible Side Effects Of Medications:    Discussed risks and benefits of treatment with patient including risk of parkinsonian symptoms, metabolic syndrome, tardive dyskinesia and neuroleptic malignant syndrome related to treatment with antipsychotic medications     Controlled Medication Discussion:     Not applicable - controlled prescriptions are not prescribed by this practice    Evaluation of Psychotropic Drugs for possible gradual dose reductions    Psychotropic medications have been reviewed  Patient continues with symptoms of mood disorder with occasional psychosis as noted above    Any/or further dose reductions at this time would be clinically contraindicated, as it would be likely to cause worsening of symptoms          MARIANA Cage

## 2022-02-08 ENCOUNTER — NURSING HOME VISIT (OUTPATIENT)
Dept: GERIATRICS | Facility: OTHER | Age: 82
End: 2022-02-08
Payer: MEDICARE

## 2022-02-08 VITALS
DIASTOLIC BLOOD PRESSURE: 72 MMHG | BODY MASS INDEX: 27.02 KG/M2 | SYSTOLIC BLOOD PRESSURE: 128 MMHG | HEART RATE: 74 BPM | WEIGHT: 183 LBS | TEMPERATURE: 98.5 F

## 2022-02-08 DIAGNOSIS — H04.129 DRY EYE: Primary | ICD-10-CM

## 2022-02-08 PROCEDURE — 99308 SBSQ NF CARE LOW MDM 20: CPT | Performed by: PHYSICIAN ASSISTANT

## 2022-02-08 NOTE — PROGRESS NOTES
[address] [facility]  [phone]) [address]  Code 32      NAME: Andrey Kwong  AGE: 78 y o  SEX: female [id number]    DATE OF ENCOUNTER: 2/8/2022    CODE STATUS: DNR/DNH/comfort care    Assessment and Plan     Problem List Items Addressed This Visit        Other    Dry eye - Primary     Reports dry eye, requesting artificial tears- ordered  Daughter was in for visit yesterday afternoon, notified nurse that she was concerned that she thought her strabismus appeared worse than usual  Staff reports this is unchanged from prior  Patient reports some blurry vision but no acute changes  Daughter requesting appt with ophtho  Will also order routine labs including TSH  No acute neuro changes  Daughter not interested in neuro/stroke workup  Patient is comfort care/DNH               No orders of the defined types were placed in this encounter  Chief Complaint     Chief Complaint   Patient presents with    Geriatric Evaluation     eye issues       History of Present Illness   78year old female resident of [address] [place] [address] being seen today in collaboration with nursing for follow up  Daughter was in for visit yesterday and mentioned to staff that her eyes seemed to look different  Daughter requesting eye appt  Spoke with daughter over the phone today  The following portions of the patient's history were reviewed and updated as appropriate: allergies, current medications, past family history, past medical history, past social history, past surgical history and problem list     Review of Systems   Review of Systems   Constitutional: Negative  Eyes: Negative for pain and redness          Dry eye, blurry vision          Active Problem List     Patient Active Problem List   Diagnosis    Diabetes mellitus ([address] )    Chronic pulmonary embolism without acute cor pulmonale (HCC)    History of DVT (deep vein thrombosis)    Atrial fibrillation (HCC)    Bipolar disorder ([facility] )    Late onset Alzheimer's disease with behavioral disturbance (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 )    DNR (do not resuscitate)    Sore throat    Frailty syndrome in geriatric patient    Dry eye         Objective     /72   Pulse 74   Temp 98 5 °F (36 9 °C)   Wt 83 kg (183 lb)   BMI 27 02 kg/m²     Physical Exam  Vitals reviewed  Constitutional:       General: She is not in acute distress  Appearance: She is not ill-appearing or diaphoretic  Eyes:      General: No scleral icterus  Right eye: No discharge  Left eye: No discharge  Conjunctiva/sclera: Conjunctivae normal       Pupils: Pupils are equal, round, and reactive to light  Comments: Left eye inward turning   Neurological:      Mental Status: She is alert  Mental status is at baseline  Psychiatric:         Mood and Affect: Mood normal          Behavior: Behavior normal          Pertinent Laboratory/Diagnostic Studies:    pending    Current Medications   Medications reviewed and updated in facility chart

## 2022-02-08 NOTE — ASSESSMENT & PLAN NOTE
Reports dry eye, requesting artificial tears- ordered  Daughter was in for visit yesterday afternoon, notified nurse that she was concerned that she thought her strabismus appeared worse than usual  Staff reports this is unchanged from prior  Patient reports some blurry vision but no acute changes  Daughter requesting appt with ophtho  Will also order routine labs including TSH  No acute neuro changes  Daughter not interested in neuro/stroke workup   Patient is comfort care/DNH

## 2022-02-16 ENCOUNTER — NURSING HOME VISIT (OUTPATIENT)
Dept: GERIATRICS | Facility: OTHER | Age: 82
End: 2022-02-16
Payer: MEDICARE

## 2022-02-16 VITALS
HEART RATE: 80 BPM | TEMPERATURE: 98.2 F | DIASTOLIC BLOOD PRESSURE: 60 MMHG | SYSTOLIC BLOOD PRESSURE: 122 MMHG | BODY MASS INDEX: 26.58 KG/M2 | WEIGHT: 180 LBS

## 2022-02-16 DIAGNOSIS — G30.1 LATE ONSET ALZHEIMER'S DISEASE WITH BEHAVIORAL DISTURBANCE (HCC): Chronic | ICD-10-CM

## 2022-02-16 DIAGNOSIS — G62.9 NEUROPATHY: ICD-10-CM

## 2022-02-16 DIAGNOSIS — E11.9 TYPE 2 DIABETES MELLITUS WITHOUT COMPLICATION, WITH LONG-TERM CURRENT USE OF INSULIN (HCC): Primary | Chronic | ICD-10-CM

## 2022-02-16 DIAGNOSIS — N31.9 NEUROGENIC BLADDER: ICD-10-CM

## 2022-02-16 DIAGNOSIS — Z79.4 TYPE 2 DIABETES MELLITUS WITHOUT COMPLICATION, WITH LONG-TERM CURRENT USE OF INSULIN (HCC): Primary | Chronic | ICD-10-CM

## 2022-02-16 DIAGNOSIS — E05.90 HYPERTHYROIDISM: ICD-10-CM

## 2022-02-16 DIAGNOSIS — F02.81 LATE ONSET ALZHEIMER'S DISEASE WITH BEHAVIORAL DISTURBANCE (HCC): Chronic | ICD-10-CM

## 2022-02-16 DIAGNOSIS — I48.91 ATRIAL FIBRILLATION, UNSPECIFIED TYPE (HCC): Chronic | ICD-10-CM

## 2022-02-16 PROCEDURE — 99309 SBSQ NF CARE MODERATE MDM 30: CPT | Performed by: PHYSICIAN ASSISTANT

## 2022-02-16 NOTE — ASSESSMENT & PLAN NOTE
Doing well in long term care facility  Continue current medication regimen including duloxetine and olanzapine

## 2022-02-16 NOTE — ASSESSMENT & PLAN NOTE
Chronic supra pubic catheter  Appears to have hematuria in bag today, per nursing supra pubic was changed last week, flushing easily, no clots  Has hx of frequent UTI's  Per nursing urine is foul smelling  Will order UA and culture

## 2022-02-16 NOTE — ASSESSMENT & PLAN NOTE
Lab Results   Component Value Date    HGBA1C 8 3 (H) 01/05/2021     Was having AM hypoglycemia and lantus was decreased last week  Blood sugars improved  Continue to monitor closely

## 2022-02-16 NOTE — PROGRESS NOTES
[facility]  [phone]) [address]  Code 32      NAME: Latasha Fritz  AGE: 78 y o  SEX: female [id number]    DATE OF ENCOUNTER: 2/16/2022    CODE STATUS: DNR/comfort care    Assessment and Plan     Problem List Items Addressed This Visit        Endocrine    Diabetes mellitus ([facility] ) - Primary (Chronic)       Lab Results   Component Value Date    HGBA1C 8 3 (H) 01/05/2021     Was having AM hypoglycemia and lantus was decreased last week  Blood sugars improved  Continue to monitor closely         Hyperthyroidism     Continue methimazole            Cardiovascular and Mediastinum    Atrial fibrillation (HCC) (Chronic)     Rate controlled  Continue digoxin   Continue xarelto            Nervous and Auditory    Late onset Alzheimer's disease with behavioral disturbance (HCC) (Chronic)     Doing well in long term care facility  Continue current medication regimen including duloxetine and olanzapine          Neuropathy     Continue gabapentin            Other    Neurogenic bladder     Chronic supra pubic catheter  Appears to have hematuria in bag today, per nursing supra pubic was changed last week, flushing easily, no clots  Has hx of frequent UTI's  Per nursing urine is foul smelling  Will order UA and culture               No orders of the defined types were placed in this encounter  Chief Complaint     Chief Complaint   Patient presents with    Geriatric Evaluation     follow up       History of Present Illness   78year old female resident of [address] [place] [address] being seen today in collaboration with nursing for follow up for chronic conditions  Patient had her lantus decreased recently for AM hypoglycemia and her blood sugars have been more stable  She feels well  She has a supra pubic cath and it was noted that she has hematuria today          The following portions of the patient's history were reviewed and updated as appropriate: allergies, current medications, past family history, past medical history, past social history, past surgical history and problem list     Review of Systems   Review of Systems   Constitutional: Negative  HENT: Negative  Eyes: Negative  Respiratory: Negative  Cardiovascular: Negative  Gastrointestinal: Positive for abdominal distention  Endocrine: Negative  Genitourinary:        Supra pubic cath  hematuria   Skin: Negative  Allergic/Immunologic: Negative  Neurological: Negative  Hematological: Negative  Psychiatric/Behavioral: Negative  Active Problem List     Patient Active Problem List   Diagnosis    Diabetes mellitus ([facility] )    Chronic pulmonary embolism without acute cor pulmonale ([facility])    History of DVT (deep vein thrombosis)    Atrial fibrillation ([facility])    Bipolar disorder ([facility] )    Late onset Alzheimer's disease with behavioral disturbance ([facility] )    Hyperthyroidism    Debility    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, of the knee    Constipation    Neurogenic bladder    Other insomnia    Ileus ([facility] )    MDD (major depressive disorder)    Other chronic pain    Neuropathy    Goals of care, counseling/discussion    Suprapubic catheter ([facility])    Pruritus    Xerosis cutis    Medication management    Abdominal distension    [facility]    DNR (do not resuscitate)    Sore throat    Frailty syndrome in geriatric patient    Dry eye         Objective     /60   Pulse 80   Temp 98 2 °F (36 8 °C)   Wt 81 6 kg (180 lb)   BMI 26 58 kg/m²     Physical Exam  Vitals reviewed  Constitutional:       General: She is not in acute distress  Appearance: She is not ill-appearing or diaphoretic  HENT:      Head: Normocephalic and atraumatic  Nose: Nose normal  No congestion  Mouth/Throat:      Mouth: Mucous membranes are moist       Pharynx: Oropharynx is clear     Eyes:      Conjunctiva/sclera: Conjunctivae normal       Pupils: Pupils are equal, round, and reactive to light  Cardiovascular:      Rate and Rhythm: Normal rate and regular rhythm  Pulses: Normal pulses  Pulmonary:      Effort: Pulmonary effort is normal  No respiratory distress  Breath sounds: Normal breath sounds  No wheezing  Abdominal:      General: Bowel sounds are normal  There is distension  Tenderness: There is no abdominal tenderness  Genitourinary:     Comments: Supra pubic cath with hematuria  Musculoskeletal:         General: No deformity or signs of injury  Cervical back: Neck supple  Lymphadenopathy:      Cervical: No cervical adenopathy  Skin:     General: Skin is warm and dry  Findings: No bruising or erythema  Neurological:      Mental Status: She is alert  Mental status is at baseline  Psychiatric:         Mood and Affect: Mood normal          Behavior: Behavior normal          Pertinent Laboratory/Diagnostic Studies:    2/9/22  Creat 0 95  hgb 11 1    Current Medications   Medications reviewed and updated in facility chart

## 2022-03-21 ENCOUNTER — NURSING HOME VISIT (OUTPATIENT)
Dept: GERIATRICS | Facility: OTHER | Age: 82
End: 2022-03-21
Payer: MEDICARE

## 2022-03-21 VITALS
SYSTOLIC BLOOD PRESSURE: 112 MMHG | DIASTOLIC BLOOD PRESSURE: 64 MMHG | HEART RATE: 72 BPM | BODY MASS INDEX: 27.33 KG/M2 | WEIGHT: 185.1 LBS | TEMPERATURE: 98.3 F

## 2022-03-21 DIAGNOSIS — H00.014 HORDEOLUM EXTERNUM OF LEFT UPPER EYELID: Primary | ICD-10-CM

## 2022-03-21 PROBLEM — H00.019 STYE: Status: ACTIVE | Noted: 2022-03-21

## 2022-03-21 PROCEDURE — 99308 SBSQ NF CARE LOW MDM 20: CPT | Performed by: PHYSICIAN ASSISTANT

## 2022-03-21 NOTE — PROGRESS NOTES
[address] [id number] [phone]) [address] [place] [address]  Code  32      NAME: Andrey Kwong  AGE: 78 y o  SEX: female [id number]    DATE OF ENCOUNTER: 3/21/2022    CODE STATUS: DNR    Assessment and Plan     Problem List Items Addressed This Visit        Other    Stye - Primary     Large stye left upper eye, appears to have started to drain purulent material from lateral part of eyelid  There is some mild surrounding redness  Less pain today since it started draining  Will start PO antibiotics due to the surrounding erythema  Start PO clindamycin  Will monitor closely to be sure she does not develop a periorbital cellulitis  Has been afebrile  Will also order warm compresses and ocusoft lid wipes               No orders of the defined types were placed in this encounter  Chief Complaint     Chief Complaint   Patient presents with    Geriatric Evaluation     left eye pain       History of Present Illness   78year old female resident of [address] [place] [address] being seen today in collaboration with nursing for pain and swelling of her left eye for 4 days  She now has drainage which started today  The following portions of the patient's history were reviewed and updated as appropriate: allergies, current medications, past family history, past medical history, past social history, past surgical history and problem list     Review of Systems   Review of Systems   Eyes: Positive for pain and discharge            Active Problem List     Patient Active Problem List   Diagnosis    [facility]    Chronic pulmonary embolism without acute cor pulmonale (HCC)    History of DVT (deep vein thrombosis)    Atrial fibrillation (HCC)    Bipolar disorder ([facility] )    Late onset Alzheimer's disease with behavioral disturbance ([facility] )    Hyperthyroidism    Debility    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, of the knee  Constipation    Neurogenic bladder    Other insomnia    Ileus (HCC)    MDD (major depressive disorder)    Other chronic pain    Neuropathy    Goals of care, counseling/discussion    Suprapubic catheter (HCC)    Pruritus    Xerosis cutis    Medication management    Abdominal distension    Fecal impaction ([address] )    DNR (do not resuscitate)    Sore throat    Frailty syndrome in geriatric patient    Dry eye    Stye         Objective     /64   Pulse 72   Temp 98 3 °F (36 8 °C)   Wt 84 kg (185 lb 1 6 oz)   BMI 27 33 kg/m²     Physical Exam  Vitals reviewed  Constitutional:       General: She is not in acute distress  Appearance: She is not diaphoretic  HENT:      Head: Normocephalic  Nose: Nose normal  No congestion  Mouth/Throat:      Mouth: Mucous membranes are moist       Pharynx: Oropharynx is clear  Eyes:      Comments: Left eye large stye upper lateral eyelid which is now draining purulent material  Mild surrounding erythema   Pulmonary:      Effort: Pulmonary effort is normal  No respiratory distress  Neurological:      Mental Status: She is alert  Mental status is at baseline  Psychiatric:         Mood and Affect: Mood normal          Behavior: Behavior normal          Pertinent Laboratory/Diagnostic Studies:    none    Current Medications   Medications reviewed and updated in facility chart

## 2022-03-31 ENCOUNTER — PROCEDURE VISIT (OUTPATIENT)
Dept: UROLOGY | Facility: AMBULATORY SURGERY CENTER | Age: 82
End: 2022-03-31
Payer: MEDICARE

## 2022-03-31 VITALS
WEIGHT: 185.1 LBS | HEART RATE: 84 BPM | BODY MASS INDEX: 27.33 KG/M2 | OXYGEN SATURATION: 97 % | SYSTOLIC BLOOD PRESSURE: 116 MMHG | DIASTOLIC BLOOD PRESSURE: 80 MMHG

## 2022-03-31 DIAGNOSIS — N31.9 NEUROGENIC BLADDER: Primary | ICD-10-CM

## 2022-03-31 PROCEDURE — 52000 CYSTOURETHROSCOPY: CPT | Performed by: UROLOGY

## 2022-03-31 NOTE — PROGRESS NOTES
Cystoscopy     Date/Time 3/31/2022 10:04 AM     Performed by  Judith Carrasco MD     Authorized by Judith Carrasco MD          Gabbie is an 66-year-old female with a history of neurogenic bladder with a chronic indwelling suprapubic tube  Her last cystoscopy was performed in 2019  She return in 2021 to undergo cystoscopy, however, at that time I was unable to obtain informed consent from either the patient or her daughter who was unavailable via telephone  I therefore recommended that she return 1 year later for cystoscopy  She returns in follow-up to the office today  Today I obtained informed consent from her daughter Rajesh houser via telephone  I reviewed the risks and benefits of cystoscopic evaluation  Informed consent was obtained via telephone  Patient was placed supine  Her abdomen was inspected and was quite distended  Peristaltic waves were visualized through the anterior abdominal wall  A right sub costal scar was noted likely consistent with prior open cholecystectomy  Her indwelling suprapubic tube was removed  The opening was prepped and draped in sterile fashion  Flexible cystoscope was passed  A small capacity bladder was noted with almost immediate leakage of fluid via her urethra  No gross abnormalities of the bladder were identified other than chronic inflammatory changes from an indwelling suprapubic tube  I could not directly visualize the ureteral orifices or the bladder neck  A new 24 Czech suprapubic tube was then placed  10 cc were placed into the balloon  The catheter easily irrigated and was connected to gravity drainage  Impression:  Neurogenic bladder, dementia    Plan:  I recommend maintaining her suprapubic tube to gravity drainage  The suprapubic tube should be exchanged every 4-6 weeks in the nursing facility  Follow-up in 2 years or sooner if needed    I recommend that the nursing facility monitor her degree of abdominal distension  If she has not had a bowel movement she may benefit from a suppository

## 2022-04-27 ENCOUNTER — NURSING HOME VISIT (OUTPATIENT)
Dept: GERIATRICS | Facility: OTHER | Age: 82
End: 2022-04-27
Payer: MEDICARE

## 2022-04-27 DIAGNOSIS — R54 FRAILTY SYNDROME IN GERIATRIC PATIENT: ICD-10-CM

## 2022-04-27 DIAGNOSIS — Z79.4 TYPE 2 DIABETES MELLITUS WITHOUT COMPLICATION, WITH LONG-TERM CURRENT USE OF INSULIN (HCC): Chronic | ICD-10-CM

## 2022-04-27 DIAGNOSIS — F02.81 LATE ONSET ALZHEIMER'S DISEASE WITH BEHAVIORAL DISTURBANCE (HCC): Chronic | ICD-10-CM

## 2022-04-27 DIAGNOSIS — G30.1 LATE ONSET ALZHEIMER'S DISEASE WITH BEHAVIORAL DISTURBANCE (HCC): Chronic | ICD-10-CM

## 2022-04-27 DIAGNOSIS — K59.00 CONSTIPATION, UNSPECIFIED CONSTIPATION TYPE: ICD-10-CM

## 2022-04-27 DIAGNOSIS — E11.9 TYPE 2 DIABETES MELLITUS WITHOUT COMPLICATION, WITH LONG-TERM CURRENT USE OF INSULIN (HCC): Chronic | ICD-10-CM

## 2022-04-27 DIAGNOSIS — E87.6 HYPOKALEMIA: ICD-10-CM

## 2022-04-27 DIAGNOSIS — Z66 DNR (DO NOT RESUSCITATE): ICD-10-CM

## 2022-04-27 DIAGNOSIS — E05.90 HYPERTHYROIDISM: ICD-10-CM

## 2022-04-27 DIAGNOSIS — I48.91 ATRIAL FIBRILLATION, UNSPECIFIED TYPE (HCC): Primary | Chronic | ICD-10-CM

## 2022-04-27 DIAGNOSIS — N31.9 NEUROGENIC BLADDER: ICD-10-CM

## 2022-04-27 DIAGNOSIS — Z93.59 CHRONIC SUPRAPUBIC CATHETER (HCC): ICD-10-CM

## 2022-04-27 PROCEDURE — 99309 SBSQ NF CARE MODERATE MDM 30: CPT | Performed by: INTERNAL MEDICINE

## 2022-04-27 NOTE — PROGRESS NOTES
[address]  [address] [place] [address] 57, [address] [facility]  Facility: [facility]  32  Follow-up visit    NAME: Ann-Marie Crowder  AGE: 78 y o  SEX: female    DATE OF ENCOUNTER: 4/27/2022    Code status:  DNR/DNH    Assessment and Plan     1  Atrial fibrillation, unspecified type [facility])  Assessment & Plan:  · She is doing well digoxin for rate control (February 9, 2022:  Digoxin level 0 8)  · She is doing well with rivaroxaban for anticoagulation   · Will continue with this medication regimen   · Will continue clinical and periodic laboratory monitoring for change in her condition      2  Constipation, unspecified constipation type  Assessment & Plan:  · Nursing staff endorses that she is doing well with routine senna and MiraLax   · Will continue with this medication regimen   · Will continue with monitoring for change in her condition      3  Type 2 diabetes mellitus without complication, with long-term current use of insulin ([facility])  Assessment & Plan:  · Review of her fingerstick blood sugar log looks well but with variable values with insulins glargine and lispro   · Will continue with this medication regimen   · Will continue with clinical and periodic laboratory monitoring for change in her condition      4  Frailty syndrome in geriatric patient  Assessment & Plan:  · Secondary to her chronic medical conditions   · She continues to require 24/7 care/support of her ADLs  · She is level 7 on the clinical frailty scale consistent with being severely frail   · I recommend continued care/support of her ADLs as a long-term resident at the nursing facility   · Will continue to monitor for change in her condition      5  Hyperthyroidism  Assessment & Plan:  · February 9, 2022: TSH level: 1 75  · She is doing well with her current dosage of methimazole  · Will continue with clinical and periodic laboratory monitoring for change in her condition      6   Late onset Alzheimer's disease with behavioral disturbance [facility])  Assessment & Plan:  · Will provide a safe, secure, structured, and supportive environment at the nursing facility  · She is being followed and treated by the facility psychiatry service   · Will continue her care in collaboration with the facility psychiatry service   · Will continue with monitoring for change in her condition      7  Neurogenic bladder  Assessment & Plan:  · She is status post suprapubic catheter placement  · Will continue her care in collaboration with the urology service   · Nursing continue with routine catheter care and maintenance  · Will continue with monitoring for change in her condition      8  Chronic suprapubic catheter [facility])  Assessment & Plan:  · She is following with the urology service for care  · Nursing continues with routine catheter care  · Will continue with monitoring for change in her condition      9  Hypokalemia  Assessment & Plan:  · Secondary to chronic GI loss   · Will update her laboratory values   · Will continue with potassium supplement  · Will continue with periodic laboratory monitoring for change in her condition      10  DNR (do not resuscitate)    See my note of December 22, 2021 for further assessment and plan  I have asked for a CBC with diff, TIBC profile, CMP, and hemoglobin A1c to be performed to assist in her medical care  All medications and routine orders were reviewed and updated as needed  Plan discussed with:  Nursing staff  Chief Complaint     She is seen for a follow-up visit to update the care and treatment of her atrial fibrillation, constipation, insulin-requiring type 2 diabetes mellitus, and frailty secondary to her chronic medical conditions      History of Present Illness     She is an 78-year-old woman who is seen with nursing staff for a follow-up visit to update the care and treatment of her atrial fibrillation-doing well with rivaroxaban, constipation-nursing endorses doing well with routine MiraLax and senna, insulin-requiring type 2 diabetes mellitus-doing well with insulins glargine and lispro, and frailty secondary to her chronic medical conditions-she continues to require 24/7 care/support of her ADLs  Nursing endorses that her appetite is good, that she is sleeping well, and that she is having no difficulty with constipation  She requires assistance with her ADLs  Nursing reports that she is not exhibiting any behaviors that are distressing to her or disruptive to the nursing unit  The following portions of the patient's history were reviewed and updated as appropriate: current medications, past family history, past medical history, past social history, past surgical history and problem list     Allergies: Allergies   Allergen Reactions    Risperdal [Risperidone]        Review of Systems     Review of Systems   Unable to perform ROS: Dementia       Medications and orders     All medications reviewed and updated in Nursing Home EMR  Objective     Vitals:  Monthly vitals:  Weight 184 6 lb (stable), pulse 72, blood pressure 128/72, fasting fingerstick blood sugar 208  Physical Exam  Vitals reviewed  Exam conducted with a chaperone present  Constitutional:       General: She is awake  She is not in acute distress  Appearance: She is well-developed  She is not ill-appearing, toxic-appearing or diaphoretic  Comments: Appears comfortable, stated age, and frail  Cardiovascular:      Rate and Rhythm: Normal rate and regular rhythm  Heart sounds: Normal heart sounds  No murmur heard  No friction rub  No gallop  Comments: There is no pretibial edema, bilaterally  Pulmonary:      Effort: Pulmonary effort is normal  No respiratory distress  Breath sounds: Normal breath sounds  No stridor  No wheezing, rhonchi or rales  Abdominal:      General: Abdomen is flat  There is distension  Palpations: Abdomen is soft  There is no mass        Tenderness: There is no abdominal tenderness  There is no guarding or rebound  Musculoskeletal:      Comments: Prevalon boots in place, bilaterally   Neurological:      Mental Status: She is alert  Psychiatric:         Speech: Speech is tangential          Behavior: Behavior is cooperative  Pertinent Laboratory/Diagnostic Studies: The following labs were reviewed please see chart or hospital paperwork for details  February 2, 2022:     CBC with diff:  WBC count 7 1, hemoglobin 11 1, hematocrit 34, platelet count [id number], MCV 77     TSH:  1 75     BMP: Sodium 140, potassium 3 1, BUN 11, creatinine 0 95, fasting blood sugar 85, EGFR 56     Digoxin level:  0 8    - Her order summary was reviewed and signed  Portions of the record may have been created with voice recognition software  Occasional wrong word or "sound a like" substitutions may have occurred due to the inherent limitations of voice recognition software  Read the chart carefully and recognize, using context, where substitutions have occurred      NANY Snyder   5/8/2022 10:45 PM

## 2022-05-03 ENCOUNTER — NURSING HOME VISIT (OUTPATIENT)
Dept: GERIATRICS | Facility: OTHER | Age: 82
End: 2022-05-03
Payer: MEDICARE

## 2022-05-03 DIAGNOSIS — R41.89 UNRESPONSIVE EPISODE: Primary | ICD-10-CM

## 2022-05-03 PROCEDURE — 99309 SBSQ NF CARE MODERATE MDM 30: CPT | Performed by: PHYSICIAN ASSISTANT

## 2022-05-04 VITALS
TEMPERATURE: 96.4 F | HEART RATE: 117 BPM | RESPIRATION RATE: 18 BRPM | SYSTOLIC BLOOD PRESSURE: 190 MMHG | DIASTOLIC BLOOD PRESSURE: 95 MMHG

## 2022-05-04 PROBLEM — R40.4 UNRESPONSIVE EPISODE: Status: ACTIVE | Noted: 2022-05-04

## 2022-05-04 PROBLEM — R41.89 UNRESPONSIVE EPISODE: Status: ACTIVE | Noted: 2022-05-04

## 2022-05-04 NOTE — ASSESSMENT & PLAN NOTE
Called to see patient urgently for brief episode of unresponsiveness  Patient is DNR/DNH  Staff states she vomited after breakfast and was briefly unresponsive  Blood sugar 246  At the time of my evaluation she was answering questions but not as alert as usual   No focal neuro deficits  States she feels generally weak and tired  No reports of her being awake last night  No recent med changes  Afebrile  Daughter in to see patient  Confirmed DNR/DNH status  Patient almost back to baseline at this point  Will continue to monitor for change in condition  No additional workup ordered at this point  Daughter in agreement

## 2022-05-04 NOTE — PROGRESS NOTES
[address] [facility]  [phone]) [address] [address]  Code 32      NAME: Layne Speaker  AGE: 78 y o  SEX: female [id number]    DATE OF ENCOUNTER: 5/3/22    CODE STATUS: DNR/DNH    Assessment and Plan     Problem List Items Addressed This Visit        Other    Unresponsive episode - Primary     Called to see patient urgently for brief episode of unresponsiveness  Patient is DNR/DNH  Staff states she vomited after breakfast and was briefly unresponsive  Blood sugar 246  At the time of my evaluation she was answering questions but not as alert as usual   No focal neuro deficits  States she feels generally weak and tired  No reports of her being awake last night  No recent med changes  Afebrile  Daughter in to see patient  Confirmed DNR/DNH status  Patient almost back to baseline at this point  Will continue to monitor for change in condition  No additional workup ordered at this point  Daughter in agreement  No orders of the defined types were placed in this encounter  Chief Complaint     Chief Complaint   Patient presents with    Geriatric Evaluation     unresponsive episode       History of Present Illness   78year old female resident of [address] [place] [address] facility being seen today in collaboration with nursing for episode of unresponsiveness  Patient vomited after breakfast  Staff does not think she aspirated  She had brief episode of unresponsiveness  Staff states she looked pale  She did not stop breathing or lose a pulse  Daughter at bedside, confirmed DNR/DNH status          The following portions of the patient's history were reviewed and updated as appropriate: allergies, current medications, past family history, past medical history, past social history, past surgical history and problem list     Review of Systems   Review of Systems   Unable to perform ROS: Acuity of condition          Active Problem List Patient Active Problem List   Diagnosis    [facility] mellitus ([facility] )    Chronic pulmonary embolism without acute cor pulmonale (HCC)    History of DVT (deep vein thrombosis)    [facility] fibrillation ([facility])    Bipolar disorder ([facility] )    Late onset Alzheimer's disease with behavioral disturbance ([facility] )    Hyperthyroidism    Debility    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, of the knee    Constipation    Neurogenic bladder    Other insomnia    Ileus ([facility] )    MDD (major depressive disorder)    Other chronic pain    Neuropathy    Goals of care, counseling/discussion    Suprapubic catheter ([facility])    Pruritus    Xerosis cutis    Medication management    Abdominal distension    Fecal impaction ([facility] )    DNR (do not resuscitate)    Sore throat    Frailty syndrome in geriatric patient    Dry eye    Stye    Unresponsive episode         Objective     BP (!) 190/95   Pulse (!) 117   Temp (!) 96 4 °F (35 8 °C)   Resp 18     Physical Exam  Vitals reviewed  Constitutional:       General: She is not in acute distress  Appearance: She is not diaphoretic  HENT:      Head: Normocephalic and atraumatic  Cardiovascular:      Rate and Rhythm: Tachycardia present  Pulses: Normal pulses  Pulmonary:      Effort: Pulmonary effort is normal  No respiratory distress  Breath sounds: Normal breath sounds  No wheezing  Abdominal:      Comments: Abdomen usually very distended and firm, still distended but less than usual   Skin:     General: Skin is warm and dry  Findings: No bruising or erythema  Neurological:      Comments: No focal deficit, answering questions slowly and following commands         Pertinent Laboratory/Diagnostic Studies:    none    Current Medications   Medications reviewed and updated in facility chart        I have spent 30 minutes with Patient and family today in which greater than 50% of this time was spent in counseling/coordination of care regarding Prognosis

## 2022-05-08 PROBLEM — E87.6 HYPOKALEMIA: Status: ACTIVE | Noted: 2022-05-08

## 2022-05-09 NOTE — ASSESSMENT & PLAN NOTE
· February 9, 2022: TSH level: 1 75  · She is doing well with her current dosage of methimazole  · Will continue with clinical and periodic laboratory monitoring for change in her condition

## 2022-05-09 NOTE — ASSESSMENT & PLAN NOTE
· Review of her fingerstick blood sugar log looks well but with variable values with insulins glargine and lispro   · Will continue with this medication regimen   · Will continue with clinical and periodic laboratory monitoring for change in her condition

## 2022-05-09 NOTE — ASSESSMENT & PLAN NOTE
· She is doing well digoxin for rate control (February 9, 2022:  Digoxin level 0 8)  · She is doing well with rivaroxaban for anticoagulation   · Will continue with this medication regimen   · Will continue clinical and periodic laboratory monitoring for change in her condition

## 2022-05-09 NOTE — ASSESSMENT & PLAN NOTE
· Nursing staff endorses that she is doing well with routine senna and MiraLax   · Will continue with this medication regimen   · Will continue with monitoring for change in her condition

## 2022-05-09 NOTE — ASSESSMENT & PLAN NOTE
· She is status post suprapubic catheter placement  · Will continue her care in collaboration with the urology service   · Nursing continue with routine catheter care and maintenance  · Will continue with monitoring for change in her condition

## 2022-05-09 NOTE — ASSESSMENT & PLAN NOTE
· Secondary to chronic GI loss   · Will update her laboratory values   · Will continue with potassium supplement  · Will continue with periodic laboratory monitoring for change in her condition

## 2022-05-09 NOTE — ASSESSMENT & PLAN NOTE
· She is following with the urology service for care  · Nursing continues with routine catheter care  · Will continue with monitoring for change in her condition

## 2022-05-09 NOTE — ASSESSMENT & PLAN NOTE
· Will provide a safe, secure, structured, and supportive environment at the nursing facility  · She is being followed and treated by the facility psychiatry service   · Will continue her care in collaboration with the facility psychiatry service   · Will continue with monitoring for change in her condition

## 2022-06-28 ENCOUNTER — NURSING HOME VISIT (OUTPATIENT)
Dept: GERIATRICS | Facility: OTHER | Age: 82
End: 2022-06-28
Payer: MEDICARE

## 2022-06-28 DIAGNOSIS — G30.1 LATE ONSET ALZHEIMER'S DISEASE WITH BEHAVIORAL DISTURBANCE (HCC): Chronic | ICD-10-CM

## 2022-06-28 DIAGNOSIS — M11.269 CHONDROCALCINOSIS DUE TO DICALCIUM PHOSPHATE CRYSTALS, OF THE KNEE, UNSPECIFIED LATERALITY: ICD-10-CM

## 2022-06-28 DIAGNOSIS — E05.90 HYPERTHYROIDISM: ICD-10-CM

## 2022-06-28 DIAGNOSIS — Z79.4 TYPE 2 DIABETES MELLITUS WITHOUT COMPLICATION, WITH LONG-TERM CURRENT USE OF INSULIN (HCC): Primary | Chronic | ICD-10-CM

## 2022-06-28 DIAGNOSIS — N31.9 NEUROGENIC BLADDER: ICD-10-CM

## 2022-06-28 DIAGNOSIS — F02.81 LATE ONSET ALZHEIMER'S DISEASE WITH BEHAVIORAL DISTURBANCE (HCC): Chronic | ICD-10-CM

## 2022-06-28 DIAGNOSIS — I48.91 ATRIAL FIBRILLATION, UNSPECIFIED TYPE (HCC): Chronic | ICD-10-CM

## 2022-06-28 DIAGNOSIS — E11.9 TYPE 2 DIABETES MELLITUS WITHOUT COMPLICATION, WITH LONG-TERM CURRENT USE OF INSULIN (HCC): Primary | Chronic | ICD-10-CM

## 2022-06-28 DIAGNOSIS — G62.9 NEUROPATHY: ICD-10-CM

## 2022-06-28 PROCEDURE — 99309 SBSQ NF CARE MODERATE MDM 30: CPT | Performed by: PHYSICIAN ASSISTANT

## 2022-06-29 VITALS
DIASTOLIC BLOOD PRESSURE: 74 MMHG | SYSTOLIC BLOOD PRESSURE: 128 MMHG | TEMPERATURE: 98.3 F | RESPIRATION RATE: 18 BRPM | WEIGHT: 185.3 LBS | BODY MASS INDEX: 27.36 KG/M2

## 2022-06-29 PROBLEM — K56.41 FECAL IMPACTION (HCC): Status: RESOLVED | Noted: 2021-09-14 | Resolved: 2022-06-29

## 2022-06-29 PROBLEM — J02.9 SORE THROAT: Status: RESOLVED | Noted: 2022-01-02 | Resolved: 2022-06-29

## 2022-06-29 PROBLEM — R40.4 UNRESPONSIVE EPISODE: Status: RESOLVED | Noted: 2022-05-04 | Resolved: 2022-06-29

## 2022-06-29 PROBLEM — R41.89 UNRESPONSIVE EPISODE: Status: RESOLVED | Noted: 2022-05-04 | Resolved: 2022-06-29

## 2022-06-29 PROBLEM — L29.9 PRURITUS: Status: RESOLVED | Noted: 2020-11-12 | Resolved: 2022-06-29

## 2022-06-29 PROBLEM — H00.019 STYE: Status: RESOLVED | Noted: 2022-03-21 | Resolved: 2022-06-29

## 2022-06-29 PROBLEM — K56.7 ILEUS (HCC): Status: RESOLVED | Noted: 2019-09-03 | Resolved: 2022-06-29

## 2022-06-29 NOTE — PROGRESS NOTES
[address] [phone]) [address]  Code  32      NAME: Alexi Valencia  AGE: 78 y o  SEX: female [id number]    DATE OF ENCOUNTER: 6/28/22    CODE STATUS: DNR/DNH    Assessment and Plan     Problem List Items Addressed This Visit        Endocrine    Diabetes mellitus ([facility] ) - Primary (Chronic)       Lab Results   Component Value Date    HGBA1C 7 1 (H) 12/29/2021     Continue current dose lantus/novolog           Hyperthyroidism     Doing well with her current dose methimazole  Continue periodic monitoring of blood work              Cardiovascular and Mediastinum    Atrial fibrillation (HCC) (Chronic)     Rate controlled  Continue digoxin  Continue xarelto for anticoagulation              Nervous and Auditory    Late onset Alzheimer's disease with behavioral disturbance ([facility] ) (Chronic)     Doing well in long term care facility  Continue olanzapine, duloxetine  Continue supportive care           Neuropathy     Continue gabapentin              Musculoskeletal and Integument    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, of the knee     Continue colchicine              Other    Neurogenic bladder     Chronic supraubic cath                 No orders of the defined types were placed in this encounter  Chief Complaint     Chief Complaint   Patient presents with    Geriatric Evaluation     Follow up       History of Present Illness   78year old female resident of [address] [place] [address] being seen today in collaboration with nursing for follow up for chronic conditions  Patient has no new complaints  Nursing has no concerns          The following portions of the patient's history were reviewed and updated as appropriate: allergies, current medications, past family history, past medical history, past social history, past surgical history and problem list     Review of Systems   Review of Systems   Constitutional: Negative  HENT: Negative  Eyes: Negative  Respiratory: Negative  Cardiovascular: Negative  Gastrointestinal: Positive for abdominal distention  Endocrine: Negative  Genitourinary:        Suprapubic cath   Musculoskeletal: Positive for arthralgias and gait problem  Skin: Negative  Allergic/Immunologic: Negative  Hematological: Negative  Psychiatric/Behavioral: Negative  Active Problem List     Patient Active Problem List   Diagnosis    Diabetes mellitus ([address] [address] )    Chronic pulmonary embolism without acute cor pulmonale (HCC)    History of DVT (deep vein thrombosis)    [facility] ([facility])    Bipolar disorder ([address] [address] )    Late onset Alzheimer's disease with behavioral disturbance ([facility] )    Hyperthyroidism    Debility    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, of the knee    Constipation    Neurogenic bladder    Other insomnia    MDD (major depressive disorder)    Other chronic pain    Neuropathy    Goals of care, counseling/discussion    Chronic suprapubic catheter ([facility])    Xerosis cutis    Medication management    Abdominal distension    DNR (do not resuscitate)    Frailty syndrome in geriatric patient    Dry eye    Hypokalemia         Objective     /74   Temp 98 3 °F (36 8 °C)   Resp 18   Wt 84 1 kg (185 lb 4 8 oz)   BMI 27 36 kg/m²     Physical Exam  Vitals reviewed  Constitutional:       General: She is not in acute distress  Appearance: She is not ill-appearing or diaphoretic  HENT:      Head: Normocephalic and atraumatic  Nose: Nose normal  No congestion  Mouth/Throat:      Mouth: Mucous membranes are moist       Pharynx: Oropharynx is clear  Eyes:      General:         Right eye: No discharge  Left eye: No discharge  Conjunctiva/sclera: Conjunctivae normal    Cardiovascular:      Rate and Rhythm: Normal rate  Pulmonary:      Effort: Pulmonary effort is normal  No respiratory distress  Breath sounds: Normal breath sounds  No wheezing  Abdominal:      Tenderness: There is no abdominal tenderness  Comments: Chronically distended, at baseline   Musculoskeletal:         General: No deformity or signs of injury  Cervical back: Neck supple  Lymphadenopathy:      Cervical: No cervical adenopathy  Skin:     General: Skin is warm and dry  Findings: No bruising or erythema  Neurological:      Mental Status: She is alert  Mental status is at baseline  Psychiatric:         Mood and Affect: Mood normal          Behavior: Behavior normal          Pertinent Laboratory/Diagnostic Studies:    5/5/22  Creat 1 00  K 3 9    Current Medications   Medications reviewed and updated in facility chart

## 2022-06-29 NOTE — ASSESSMENT & PLAN NOTE
Lab Results   Component Value Date    HGBA1C 7 1 (H) 12/29/2021     Continue current dose lantus/novolog

## 2022-08-07 ENCOUNTER — TELEPHONE (OUTPATIENT)
Dept: OTHER | Facility: OTHER | Age: 82
End: 2022-08-07

## 2022-09-12 ENCOUNTER — NURSING HOME VISIT (OUTPATIENT)
Dept: GERIATRICS | Facility: OTHER | Age: 82
End: 2022-09-12
Payer: MEDICARE

## 2022-09-12 DIAGNOSIS — F31.30 BIPOLAR AFFECTIVE DISORDER, CURRENT EPISODE DEPRESSED, CURRENT EPISODE SEVERITY UNSPECIFIED (HCC): Chronic | ICD-10-CM

## 2022-09-12 DIAGNOSIS — R54 FRAILTY SYNDROME IN GERIATRIC PATIENT: ICD-10-CM

## 2022-09-12 DIAGNOSIS — Z86.718 HISTORY OF DVT (DEEP VEIN THROMBOSIS): ICD-10-CM

## 2022-09-12 DIAGNOSIS — Z93.59 CHRONIC SUPRAPUBIC CATHETER (HCC): ICD-10-CM

## 2022-09-12 DIAGNOSIS — Z66 DNR (DO NOT RESUSCITATE): ICD-10-CM

## 2022-09-12 DIAGNOSIS — N31.9 NEUROGENIC BLADDER: ICD-10-CM

## 2022-09-12 DIAGNOSIS — E87.6 HYPOKALEMIA: ICD-10-CM

## 2022-09-12 DIAGNOSIS — K59.00 CONSTIPATION, UNSPECIFIED CONSTIPATION TYPE: ICD-10-CM

## 2022-09-12 DIAGNOSIS — E05.90 HYPERTHYROIDISM: ICD-10-CM

## 2022-09-12 DIAGNOSIS — I48.91 ATRIAL FIBRILLATION, UNSPECIFIED TYPE (HCC): Primary | Chronic | ICD-10-CM

## 2022-09-12 PROCEDURE — 99309 SBSQ NF CARE MODERATE MDM 30: CPT | Performed by: INTERNAL MEDICINE

## 2022-09-12 NOTE — PROGRESS NOTES
Sunita [address]  [address]  Facility: [facility] and [facility]  32  Follow-up visit    NAME: Kwabena Jimenez  AGE: 78 y o  SEX: female    DATE OF ENCOUNTER: 9/12/2022    Code status:  DNR    Assessment and Plan     1  Atrial fibrillation, unspecified type [facility])  Assessment & Plan:  · She is doing well with rivaroxaban for anticoagulation  · She is doing well with digoxin for rate control   · Will continue with this care plan  · Will continue with clinical and periodic laboratory monitoring for change in her condition      2  Frailty syndrome in geriatric patient  Assessment & Plan:  · Secondary to her chronic medical conditions   · She continues to require 24/7 care/support of her ADLs   · She is level 7 on the clinical frailty scale consistent with being severely frail   · I recommend continued care/support of her ADLs as a long-term resident at the nursing facility   · Will continue to monitor for change in her condition      3  Hyperthyroidism  Assessment & Plan:  · January 5, 2021: TSH:  1 96  · February 9, 2022: TSH level: 1 75  · She is doing well with methimazole therapy  · Will continue with clinical and periodic laboratory monitoring for change in her condition      4  History of DVT (deep vein thrombosis)  Assessment & Plan:  · She is doing well with rivaroxaban   · She continues on lifelong therapy secondary to decreased mobility and high likelihood of recurrence (she is also on the medication for atrial fibrillation)  · Will continue with clinical and periodic laboratory monitoring for change in her condition      5  Neurogenic bladder  Assessment & Plan:  · Status post suprapubic catheter placement  · Will continue care in collaboration with her urology service      6   Chronic suprapubic catheter [facility])  Assessment & Plan:  · Secondary to neurogenic bladder  · Nursing staff to continue with routine care and maintenance of the suprapubic catheter  · Will continue her care and collaboration with her urology service   · Will continue with monitoring for change in her condition      7  Hypokalemia  Assessment & Plan:  · Secondary to chronic GI loss  · She is doing well with her oral potassium replacement therapy   · Will continue with periodic laboratory monitoring for change in her condition      8  Constipation, unspecified constipation type  Assessment & Plan:  · Nursing staff reports that she is doing well with routine senna and MiraLax  · Will continue with this care plan  · Will continue with monitoring for change in her condition      9  Bipolar affective disorder, current episode depressed, current episode severity unspecified [facility])  Assessment & Plan:  · Will continue her care in collaboration with the [facility] psychiatry service   · Nursing staff reports that she is not exhibiting any behaviors that are distressing to her or disruptive to the nursing unit   · Will continue with monitoring for change in her condition      10  DNR (do not resuscitate)    See my note of April 27, 2022 for further information  I have asked for a BMP, CBC with diff, and ferritin level to be performed to assist in her medical care  All medications and routine orders were reviewed and updated as needed  Plan discussed with:  Nursing staff  Chief Complaint     She is seen for a follow-up visit to update the care and treatment of her atrial fibrillation, frailty secondary to her chronic medical conditions, hyperthyroidism, and history of DVT      History of Present Illness     She is an 47-year-old woman who is seen with nursing staff for a follow-up visit to update the care and treatment of her atrial fibrillation-doing well with rivaroxaban, frailty secondary to her chronic medical conditions-she continues to require 24/7 care/support of her ADLs, hyperthyroidism-doing well with methimazole, and history of DVT-doing well with rivaroxaban  Nursing staff endorses that her appetite is good, that she is sleeping well, and that she is having no difficulty with constipation  She requires assistance with her ADLs  Nursing reports that she is not exhibiting any behaviors that are distressing to her or disruptive to the nursing unit  The following portions of the patient's history were reviewed and updated as appropriate: current medications, past family history, past medical history, past social history, past surgical history and problem list     Allergies: Allergies   Allergen Reactions    Risperdal [Risperidone]        Review of Systems     Review of Systems   Unable to perform ROS: Psychiatric disorder       Medications and orders     All medications reviewed and updated in Nursing Home EMR  Objective     Vitals:  Monthly vitals:  Weight 179 lb (stable for 12 months), pulse 72, blood pressure 138/76, fasting fingerstick blood sugar 303  Review of her BP and AP logs looks well  Review of her fingerstick blood sugar log reveals variable blood sugars  Physical Exam  Vitals reviewed  Exam conducted with a chaperone present  Constitutional:       General: She is awake  She is not in acute distress  Appearance: She is well-developed  She is not toxic-appearing or diaphoretic  Comments: She appears comfortable lying in bed, her stated age, and frail  Cardiovascular:      Rate and Rhythm: Normal rate  Rhythm irregular  Heart sounds: Normal heart sounds  No murmur heard  No friction rub  No gallop  Comments: There is no pretibial edema, bilaterally  Pulmonary:      Effort: Pulmonary effort is normal  No respiratory distress  Breath sounds: Normal breath sounds  No stridor  No wheezing, rhonchi or rales  Neurological:      Mental Status: She is alert  Psychiatric:         Speech: Speech is tangential          Behavior: Behavior is cooperative  Pertinent Laboratory/Diagnostic Studies:      The following labs were reviewed please see chart or hospital paperwork for details  April 28, 2022:     CMP:  Sodium 140, potassium 3 4, BUN 12, creatinine 0 99, fasting blood sugar 152, EGFR 57, LFTs within normal limits except albumin 2 8    Hemoglobin A1c: 7 4%,      May 5, 2022: BMP:  Sodium 140, potassium 3 9, BUN 17, creatinine 1, fasting blood sugar 212, EGFR 56     - Her order summary was reviewed and signed  Portions of the record may have been created with voice recognition software  Occasional wrong word or "sound a like" substitutions may have occurred due to the inherent limitations of voice recognition software  Read the chart carefully and recognize, using context, where substitutions have occurred      NANY Estrada   9/29/2022 12:40 PM

## 2022-09-29 NOTE — ASSESSMENT & PLAN NOTE
· Will continue her care in collaboration with the facility psychiatry service   · Nursing staff reports that she is not exhibiting any behaviors that are distressing to her or disruptive to the nursing unit   · Will continue with monitoring for change in her condition

## 2022-09-29 NOTE — ASSESSMENT & PLAN NOTE
· Secondary to neurogenic bladder  · Nursing staff to continue with routine care and maintenance of the suprapubic catheter  · Will continue her care and collaboration with her urology service   · Will continue with monitoring for change in her condition

## 2022-09-29 NOTE — ASSESSMENT & PLAN NOTE
· Secondary to her chronic medical conditions   · She continues to require 24/7 care/support of her ADLs   · She is level 7 on the clinical frailty scale consistent with being severely frail   · I recommend continued care/support of her ADLs as a long-term resident at the nursing facility   · Will continue to monitor for change in her condition

## 2022-09-29 NOTE — ASSESSMENT & PLAN NOTE
· Secondary to chronic GI loss  · She is doing well with her oral potassium replacement therapy   · Will continue with periodic laboratory monitoring for change in her condition

## 2022-09-29 NOTE — ASSESSMENT & PLAN NOTE
· She is doing well with rivaroxaban   · She continues on lifelong therapy secondary to decreased mobility and high likelihood of recurrence (she is also on the medication for atrial fibrillation)  · Will continue with clinical and periodic laboratory monitoring for change in her condition

## 2022-09-29 NOTE — ASSESSMENT & PLAN NOTE
· Nursing staff reports that she is doing well with routine senna and MiraLax  · Will continue with this care plan  · Will continue with monitoring for change in her condition

## 2022-09-29 NOTE — ASSESSMENT & PLAN NOTE
· January 5, 2021: TSH:  1 96  · February 9, 2022: TSH level: 1 75  · She is doing well with methimazole therapy  · Will continue with clinical and periodic laboratory monitoring for change in her condition

## 2022-09-29 NOTE — ASSESSMENT & PLAN NOTE
· She is doing well with rivaroxaban for anticoagulation  · She is doing well with digoxin for rate control   · Will continue with this care plan  · Will continue with clinical and periodic laboratory monitoring for change in her condition

## 2022-09-29 NOTE — ASSESSMENT & PLAN NOTE
· Status post suprapubic catheter placement  · Will continue care in collaboration with her urology service

## 2022-11-11 ENCOUNTER — NURSING HOME VISIT (OUTPATIENT)
Dept: GERIATRICS | Facility: OTHER | Age: 82
End: 2022-11-11

## 2022-11-11 DIAGNOSIS — G62.9 NEUROPATHY: ICD-10-CM

## 2022-11-11 DIAGNOSIS — E11.9 TYPE 2 DIABETES MELLITUS WITHOUT COMPLICATION, WITH LONG-TERM CURRENT USE OF INSULIN (HCC): Primary | Chronic | ICD-10-CM

## 2022-11-11 DIAGNOSIS — E05.90 HYPERTHYROIDISM: ICD-10-CM

## 2022-11-11 DIAGNOSIS — G30.1 LATE ONSET ALZHEIMER'S DISEASE WITH BEHAVIORAL DISTURBANCE (HCC): Chronic | ICD-10-CM

## 2022-11-11 DIAGNOSIS — F02.818 LATE ONSET ALZHEIMER'S DISEASE WITH BEHAVIORAL DISTURBANCE (HCC): Chronic | ICD-10-CM

## 2022-11-11 DIAGNOSIS — Z79.4 TYPE 2 DIABETES MELLITUS WITHOUT COMPLICATION, WITH LONG-TERM CURRENT USE OF INSULIN (HCC): Primary | Chronic | ICD-10-CM

## 2022-11-11 DIAGNOSIS — I48.91 ATRIAL FIBRILLATION, UNSPECIFIED TYPE (HCC): Chronic | ICD-10-CM

## 2022-11-11 DIAGNOSIS — N31.9 NEUROGENIC BLADDER: ICD-10-CM

## 2022-11-14 VITALS
WEIGHT: 181.6 LBS | BODY MASS INDEX: 26.82 KG/M2 | SYSTOLIC BLOOD PRESSURE: 126 MMHG | HEART RATE: 72 BPM | TEMPERATURE: 98.2 F | DIASTOLIC BLOOD PRESSURE: 78 MMHG

## 2022-11-14 NOTE — ASSESSMENT & PLAN NOTE
Doing well in long term care facility  Follows with psychiatry service  Continue current medication regimen including zyprexa, duloxetine  Continue supportive care

## 2022-11-14 NOTE — PROGRESS NOTES
[facility]  [phone]) [place] [place] [address]  Code  32      NAME: Bruno Elder  AGE: 78 y o  SEX: female [id number]    DATE OF ENCOUNTER: 11/11/22    CODE STATUS: DNR/DNH    Assessment and Plan     Problem List Items Addressed This Visit        Endocrine    Diabetes mellitus ([facility] ) - Primary (Chronic)       Lab Results   Component Value Date    HGBA1C 7 1 (H) 12/29/2021     Blood sugars stable  Continue novolog, lantus         Hyperthyroidism     Continue methimazole  Continue periodic testing of TSH            Cardiovascular and Mediastinum    Atrial fibrillation (HCC) (Chronic)     Rate controlled on digoxin  Continue xarelto            Nervous and Auditory    Late onset Alzheimer's disease with behavioral disturbance ([facility] ) (Chronic)     Doing well in long term care facility  Follows with psychiatry service  Continue current medication regimen including zyprexa, duloxetine  Continue supportive care         Neuropathy     Continue gabapentin            Other    Neurogenic bladder     S/p suprapubic cath placement               No orders of the defined types were placed in this encounter  Chief Complaint     Chief Complaint   Patient presents with   • Geriatric Evaluation     Follow up       History of Present Illness   78year old female resident of [address] [place] [address] being seen today in collaboration with nursing for follow up for chronic conditions  Patient feels well, has no new complaints  Nursing has no concerns  The following portions of the patient's history were reviewed and updated as appropriate: allergies, current medications, past family history, past medical history, past social history, past surgical history and problem list     Review of Systems   Review of Systems   Constitutional: Negative  HENT: Negative  Eyes: Negative  Respiratory: Negative  Cardiovascular: Negative  Gastrointestinal: Positive for abdominal distention  Negative for abdominal pain  Endocrine: Negative  Genitourinary: Negative  Musculoskeletal: Positive for back pain  Allergic/Immunologic: Negative  Neurological: Negative  Hematological: Negative  Active Problem List     Patient Active Problem List   Diagnosis   • Diabetes mellitus (HCC)   • Chronic pulmonary embolism without acute cor pulmonale (HCC)   • History of DVT (deep vein thrombosis)   • Atrial fibrillation (HCC)   • Bipolar disorder (HCC)   • Late onset Alzheimer's disease with behavioral disturbance ([address] )   • Hyperthyroidism   • Debility   • 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, of the knee   • Constipation   • Neurogenic bladder   • Other insomnia   • MDD (major depressive disorder)   • Other chronic pain   • Neuropathy   • Goals of care, counseling/discussion   • Chronic suprapubic catheter (HCC)   • Xerosis cutis   • Medication management   • Abdominal distension   • DNR (do not resuscitate)   • Frailty syndrome in geriatric patient   • Dry eye   • Hypokalemia         Objective     /78   Pulse 72   Temp 98 2 °F (36 8 °C)   Wt 82 4 kg (181 lb 9 6 oz)   BMI 26 82 kg/m²     Physical Exam  Vitals reviewed  Constitutional:       General: She is not in acute distress  Appearance: She is not ill-appearing or diaphoretic  HENT:      Head: Normocephalic and atraumatic  Nose: Nose normal  No congestion  Mouth/Throat:      Mouth: Mucous membranes are moist       Pharynx: Oropharynx is clear  Eyes:      Conjunctiva/sclera: Conjunctivae normal    Cardiovascular:      Rate and Rhythm: Normal rate  Pulmonary:      Effort: Pulmonary effort is normal  No respiratory distress  Breath sounds: Normal breath sounds  No wheezing  Abdominal:      General: Bowel sounds are normal  There is distension  Tenderness: There is no abdominal tenderness  Comments:  At baseline Musculoskeletal:         General: No signs of injury  Skin:     General: Skin is warm and dry  Findings: No bruising or erythema  Neurological:      Mental Status: She is alert  Mental status is at baseline  Psychiatric:         Mood and Affect: Mood normal          Behavior: Behavior normal          Pertinent Laboratory/Diagnostic Studies:    reviewed    Current Medications   Medications reviewed and updated in facility chart

## 2022-11-14 NOTE — ASSESSMENT & PLAN NOTE
Lab Results   Component Value Date    HGBA1C 7 1 (H) 12/29/2021     Blood sugars stable  Continue novolog, lantus

## 2023-01-20 ENCOUNTER — NURSING HOME VISIT (OUTPATIENT)
Dept: GERIATRICS | Facility: OTHER | Age: 83
End: 2023-01-20

## 2023-01-20 DIAGNOSIS — Z79.4 TYPE 2 DIABETES MELLITUS WITH HYPERGLYCEMIA, WITH LONG-TERM CURRENT USE OF INSULIN (HCC): Chronic | ICD-10-CM

## 2023-01-20 DIAGNOSIS — G30.1 LATE ONSET ALZHEIMER'S DISEASE WITH BEHAVIORAL DISTURBANCE (HCC): Chronic | ICD-10-CM

## 2023-01-20 DIAGNOSIS — F02.818 LATE ONSET ALZHEIMER'S DISEASE WITH BEHAVIORAL DISTURBANCE (HCC): Chronic | ICD-10-CM

## 2023-01-20 DIAGNOSIS — E11.65 TYPE 2 DIABETES MELLITUS WITH HYPERGLYCEMIA, WITH LONG-TERM CURRENT USE OF INSULIN (HCC): Chronic | ICD-10-CM

## 2023-01-20 DIAGNOSIS — R54 FRAILTY SYNDROME IN GERIATRIC PATIENT: Chronic | ICD-10-CM

## 2023-01-20 DIAGNOSIS — E05.90 HYPERTHYROIDISM: ICD-10-CM

## 2023-01-20 DIAGNOSIS — Z66 DNR (DO NOT RESUSCITATE): ICD-10-CM

## 2023-01-20 DIAGNOSIS — E87.6 HYPOKALEMIA: ICD-10-CM

## 2023-01-20 DIAGNOSIS — F31.30 BIPOLAR AFFECTIVE DISORDER, CURRENT EPISODE DEPRESSED, CURRENT EPISODE SEVERITY UNSPECIFIED (HCC): Chronic | ICD-10-CM

## 2023-01-20 DIAGNOSIS — I48.91 ATRIAL FIBRILLATION, UNSPECIFIED TYPE (HCC): Primary | Chronic | ICD-10-CM

## 2023-01-20 DIAGNOSIS — Z86.718 HISTORY OF DVT (DEEP VEIN THROMBOSIS): ICD-10-CM

## 2023-01-21 PROBLEM — R54 FRAILTY SYNDROME IN GERIATRIC PATIENT: Chronic | Status: ACTIVE | Noted: 2022-01-02

## 2023-01-21 PROBLEM — E05.90 HYPERTHYROIDISM: Chronic | Status: ACTIVE | Noted: 2018-12-31

## 2023-01-21 PROBLEM — E87.6 HYPOKALEMIA: Chronic | Status: ACTIVE | Noted: 2022-05-08

## 2023-01-21 RX ORDER — POTASSIUM CHLORIDE 20 MEQ/1
40 TABLET, EXTENDED RELEASE ORAL DAILY
COMMUNITY

## 2023-01-21 NOTE — ASSESSMENT & PLAN NOTE
· Will continue to provide a safe, secure, structured, and supportive environment at the nursing facility  · Will continue with monitoring for change in her condition

## 2023-01-21 NOTE — PROGRESS NOTES
Sunita [address]  [address] [place] [address]  Facility: [facility] and [address] [place] [address]    NAME: Alejandro Donis  AGE: 78 y o  SEX: female    DATE OF ENCOUNTER: 1/20/2023    Code status:  DNR    Assessment and Plan     1  Atrial fibrillation, unspecified type [facility])  Assessment & Plan:  · She is doing well with digoxin for rate control  · She is doing well with rivaroxaban for anticoagulation  · Plan is to continue with this treatment regimen  · Will continue with monitoring for change in her condition      2  Type 2 diabetes mellitus with hyperglycemia, with long-term current use of insulin [facility])  Assessment & Plan:  · November 2, 2022: Hemoglobin A1c: 8%,   · Review of her fingerstick blood sugar log reveals variable blood sugar values with her current insulin regimen (would be difficult to adjust her medications on a routine basis)  · She is at goal of a hemoglobin A1c less than or equal to 8%  · Will continue with clinical and periodic laboratory monitoring for change in her condition      3  Hypokalemia  Assessment & Plan:  · Secondary to chronic GI loss  · She is doing well with her current oral potassium replacement therapy  · Will continue with periodic laboratory monitoring for change in her condition      4  Hyperthyroidism  Assessment & Plan:  · February 9, 2022: TSH level: 1 75  · September 14, 2022: TSH: 1 89  · She is doing well with her current methimazole twice weekly  · Will continue with her current medication dosage and clinical/periodic laboratory monitoring      5   Frailty syndrome in geriatric patient  Assessment & Plan:  · Secondary to her chronic medical conditions  · She continues to require 24/7 care/support of her ADLs  · She is level 7 on the clinical frailty scale consistent with being severely frail  · I recommend continued care/support of her ADLs as a long-term resident at the nursing facility  · Will continue to monitor for change in her condition      6  History of DVT (deep vein thrombosis)  Assessment & Plan:  · She continues on rivaroxaban  · She is at high risk for recurrence given her immobility      7  Late onset Alzheimer's disease with behavioral disturbance [facility])  Assessment & Plan:  · Will continue to provide a safe, secure, structured, and supportive environment at the nursing facility  · Will continue with monitoring for change in her condition      8  Bipolar affective disorder, current episode depressed, current episode severity unspecified [facility])  Assessment & Plan:  · Plan is to continue her care in collaboration with the facility psychiatry service  · Will continue with monitoring for change in her condition      9  DNR (do not resuscitate)    See my note of September 12, 2022 for further information  All medications and routine orders were reviewed and updated as needed  Plan discussed with: Nursing staff  Chief Complaint     She is seen for a visit to update the care and treatment of her chronic medical conditions  History of Present Illness     She is an 78-year-old woman who is seen with female nursing aide for a follow-up visit to update the care and treatment of her chronic conditions, including atrial fibrillation, insulin requiring type 2 diabetes mellitus, hypokalemia, and hypothyroidism  She denies chest pain and shortness of breath  Nursing staff reports that her overall condition is stable  The following portions of the patient's history were reviewed and updated as appropriate: current medications, past family history, past medical history, past social history, past surgical history and problem list     Allergies: Allergies   Allergen Reactions   • Risperdal [Risperidone]        Review of Systems     Review of Systems   Unable to perform ROS: Psychiatric disorder       Medications and orders     All medications reviewed and updated in Nursing Home EMR        Objective Vitals: Month vitals: Weight 184  1 pounds (stable for 12 months), pulse 68, blood pressure 116/76, fasting fingerstick blood sugar 199  Physical Exam  Vitals reviewed  Exam conducted with a chaperone present  Constitutional:       General: She is awake  She is not in acute distress  Appearance: She is well-developed  She is not toxic-appearing or diaphoretic  Comments: She appears younger than her stated age and frail  Cardiovascular:      Rate and Rhythm: Normal rate and regular rhythm  Heart sounds: Normal heart sounds  No murmur heard  No friction rub  No gallop  Pulmonary:      Effort: Pulmonary effort is normal  No respiratory distress  Breath sounds: Normal breath sounds  No stridor  No wheezing, rhonchi or rales  Neurological:      Mental Status: She is alert  Psychiatric:         Attention and Perception: She is inattentive  Speech: Speech is tangential          Behavior: Behavior is cooperative  Pertinent Laboratory/Diagnostic Studies: The following labs were reviewed please see chart or hospital paperwork for details  November 2, 2022:    CBC with differential: WBC count 7 7, hemoglobin 12 3, hematocrit 37 3, platelet count 3 78,401, MCV 78    Fasting lipid profile: Total cholesterol 153, triglycerides 194, HDL 30, LDL 84    BMP: Sodium 137, potassium 4 5, BUN 16, creatinine 0 97, platelet count 349,781, MCV 58    Hemoglobin A1c: 8%,     - Her order summary was reviewed and signed  Portions of the record may have been created with voice recognition software  Occasional wrong word or "sound a like" substitutions may have occurred due to the inherent limitations of voice recognition software  Read the chart carefully and recognize, using context, where substitutions have occurred      NANY Baer   1/21/2023 2:39 PM

## 2023-01-21 NOTE — ASSESSMENT & PLAN NOTE
· Secondary to chronic GI loss  · She is doing well with her current oral potassium replacement therapy  · Will continue with periodic laboratory monitoring for change in her condition

## 2023-01-21 NOTE — ASSESSMENT & PLAN NOTE
· February 9, 2022: TSH level: 1 75  · September 14, 2022: TSH: 1 89  · She is doing well with her current methimazole twice weekly  · Will continue with her current medication dosage and clinical/periodic laboratory monitoring

## 2023-01-21 NOTE — ASSESSMENT & PLAN NOTE
· November 2, 2022: Hemoglobin A1c: 8%,   · Review of her fingerstick blood sugar log reveals variable blood sugar values with her current insulin regimen (would be difficult to adjust her medications on a routine basis)  · She is at goal of a hemoglobin A1c less than or equal to 8%  · Will continue with clinical and periodic laboratory monitoring for change in her condition

## 2023-01-21 NOTE — ASSESSMENT & PLAN NOTE
· Plan is to continue her care in collaboration with the facility psychiatry service  · Will continue with monitoring for change in her condition

## 2023-01-21 NOTE — ASSESSMENT & PLAN NOTE
· She is doing well with digoxin for rate control  · She is doing well with rivaroxaban for anticoagulation  · Plan is to continue with this treatment regimen  · Will continue with monitoring for change in her condition

## 2023-02-14 ENCOUNTER — TELEPHONE (OUTPATIENT)
Dept: UROLOGY | Facility: AMBULATORY SURGERY CENTER | Age: 83
End: 2023-02-14

## 2023-02-14 NOTE — TELEPHONE ENCOUNTER
Spoke to Mahi Lyles at [facility] and scheduled gonzalez for SPT exchange 3/7 @ 1:00 as her catheter keeps leaking - she has a 16 f so probably needs to be upsized

## 2023-03-07 ENCOUNTER — PROCEDURE VISIT (OUTPATIENT)
Dept: UROLOGY | Facility: AMBULATORY SURGERY CENTER | Age: 83
End: 2023-03-07

## 2023-03-07 VITALS — SYSTOLIC BLOOD PRESSURE: 112 MMHG | HEART RATE: 78 BPM | DIASTOLIC BLOOD PRESSURE: 70 MMHG | OXYGEN SATURATION: 93 %

## 2023-03-07 DIAGNOSIS — N31.9 NEUROGENIC BLADDER: Primary | ICD-10-CM

## 2023-03-07 NOTE — PROGRESS NOTES
3/7/2023    Magi Bradford  1940  [id number]    Diagnosis  Chief Complaint    SPT Exchange         Patient presents for SPT Exchange managed by Dr Trudie Duane  Follow up next year for Cystoscopy  Paperwork received by facility was filled out and provided back to aide that came with her  Procedure: Suprapubic Tube Change         Cystostomy tube change     Date/Time 3/7/2023 3:44 PM     Performed by  Hannah Betancourt RN     Authorized by Fernando Hayward MD      Universal Protocol   Consent: Verbal consent obtained  Risks and benefits: risks, benefits and alternatives were discussed  Consent given by: patient  Patient understanding: patient states understanding of the procedure being performed  Patient identity confirmed: verbally with patient       Procedure Details   Patient tolerance: patient tolerated the procedure well with no immediate complications         Patient arrives via stretcher  Per note receive by facility, patient has been having leaking around suprapubic site  Catheter is also due to be changed  AP came to assess site and advised to place 24f SPT, which is the current catheter size patient has  Another nurse in the office was also present for assistance  Current catheter removed without difficulty after deflation of an intact balloon  It is noted, catheter seems to not have been placed in appropriately  Site prepped with Betadine, new 24F suprapubic catheter placed via aseptic technique without incident, 10 ml balloon inflated with sterile water  Catheter was irrigated and patient began to leak around catheter site  The other nurse went to speak with AP to come and evaluate  AP came into the room to evaluate and balloon was deflated  AP manipulated catheter and inserted catheter further in and balloon was re-inflated   SPT site leaked around site and per AP, the opening of the site could be the cause of the leaking, however, it could be how the catheter is lying on the side  Patient tolerated procedure well  Attached to overnight drainage bag  Orders were provided on form from facility for catheter exchanges       Vitals:    03/07/23 1310   BP: 112/70   BP Location: Right arm   Patient Position: Supine   Cuff Size: Adult   Pulse: 78   SpO2: 93%             KEEGAN LowN, RN

## 2023-03-21 ENCOUNTER — NURSING HOME VISIT (OUTPATIENT)
Dept: GERIATRICS | Facility: OTHER | Age: 83
End: 2023-03-21

## 2023-03-21 DIAGNOSIS — Z66 DNR (DO NOT RESUSCITATE): ICD-10-CM

## 2023-03-21 DIAGNOSIS — N31.9 NEUROGENIC BLADDER: ICD-10-CM

## 2023-03-21 DIAGNOSIS — I48.91 ATRIAL FIBRILLATION, UNSPECIFIED TYPE (HCC): Chronic | ICD-10-CM

## 2023-03-21 DIAGNOSIS — F02.818 LATE ONSET ALZHEIMER'S DISEASE WITH BEHAVIORAL DISTURBANCE (HCC): Chronic | ICD-10-CM

## 2023-03-21 DIAGNOSIS — E05.90 HYPERTHYROIDISM: Chronic | ICD-10-CM

## 2023-03-21 DIAGNOSIS — M11.269 CHONDROCALCINOSIS DUE TO DICALCIUM PHOSPHATE CRYSTALS, OF THE KNEE, UNSPECIFIED LATERALITY: ICD-10-CM

## 2023-03-21 DIAGNOSIS — G30.1 LATE ONSET ALZHEIMER'S DISEASE WITH BEHAVIORAL DISTURBANCE (HCC): Chronic | ICD-10-CM

## 2023-03-21 DIAGNOSIS — E11.65 TYPE 2 DIABETES MELLITUS WITH HYPERGLYCEMIA, WITH LONG-TERM CURRENT USE OF INSULIN (HCC): Primary | Chronic | ICD-10-CM

## 2023-03-21 DIAGNOSIS — G62.9 NEUROPATHY: ICD-10-CM

## 2023-03-21 DIAGNOSIS — Z79.4 TYPE 2 DIABETES MELLITUS WITH HYPERGLYCEMIA, WITH LONG-TERM CURRENT USE OF INSULIN (HCC): Primary | Chronic | ICD-10-CM

## 2023-03-22 VITALS
HEART RATE: 76 BPM | RESPIRATION RATE: 18 BRPM | WEIGHT: 185.1 LBS | BODY MASS INDEX: 27.33 KG/M2 | DIASTOLIC BLOOD PRESSURE: 80 MMHG | SYSTOLIC BLOOD PRESSURE: 128 MMHG | TEMPERATURE: 98.3 F

## 2023-03-22 NOTE — PROGRESS NOTES
[facility]  [phone]) Colten Vargasjennifer [address]  Code 32      NAME: Luma Sanchez  AGE: 78 y o  SEX: female [id number]    DATE OF ENCOUNTER: 3/21/23    CODE STATUS: DNR    Assessment and Plan     Problem List Items Addressed This Visit        Endocrine    Diabetes mellitus ([facility] [address] ) - Primary (Chronic)       Lab Results   Component Value Date    HGBA1C 8 0 (H) 11/02/2022     Continue current dose lantus/novolog  Continue monitoring blood sugars         Hyperthyroidism (Chronic)     9/22  TSH 1 89  Continue current dose methimazole            Cardiovascular and Mediastinum    Atrial fibrillation (HCC) (Chronic)     Rate controlled on digoxin  Continue xarelto            Nervous and Auditory    Late onset Alzheimer's disease with behavioral disturbance ([facility] [address] ) (Chronic)     Follows with psychiatry service  Continue olanzapine, duloxetine  Continue supportive care         Neuropathy     Continue gabapentin            Musculoskeletal and Integument    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, of the knee     Continue colchicine            Other    Neurogenic bladder     Has suprapubic cath         DNR (do not resuscitate)       No orders of the defined types were placed in this encounter  Chief Complaint     Chief Complaint   Patient presents with   • Geriatric Evaluation     Follow up       History of Present Illness   78year old female resident of [address] [place] [address] being seen today in collaboration with nursing for follow up for chronic conditions  Patient feels well and has no complaints  Nursing has no concerns       The following portions of the patient's history were reviewed and updated as appropriate: allergies, current medications, past family history, past medical history, past social history, past surgical history and problem list     Review of Systems   Review of Systems   Constitutional: Negative      HENT: Negative  Eyes: Negative  Respiratory: Negative  Cardiovascular: Negative  Gastrointestinal: Positive for abdominal distention  Negative for abdominal pain  Endocrine: Negative  Genitourinary: Negative  Musculoskeletal: Positive for arthralgias and gait problem  Allergic/Immunologic: Negative  Hematological: Negative  Psychiatric/Behavioral: Negative  Active Problem List     Patient Active Problem List   Diagnosis   • Diabetes mellitus (HCC)   • Chronic pulmonary embolism without acute cor pulmonale (HCC)   • History of DVT (deep vein thrombosis)   • Atrial fibrillation (HCC)   • Bipolar disorder (HCC)   • Late onset Alzheimer's disease with behavioral disturbance ([facility] [address] )   • Hyperthyroidism   • Debility   • 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, of the knee   • Constipation   • Neurogenic bladder   • Other insomnia   • MDD (major depressive disorder)   • Other chronic pain   • Neuropathy   • Goals of care, counseling/discussion   • Chronic suprapubic catheter ([facility])   • Xerosis cutis   • Medication management   • Abdominal distension   • DNR (do not resuscitate)   • Frailty syndrome in geriatric patient   • Dry eye   • Hypokalemia         Objective     /80   Pulse 76   Temp 98 3 °F (36 8 °C)   Resp 18   Wt 84 kg (185 lb 1 6 oz)   BMI 27 33 kg/m²     Physical Exam  Vitals reviewed  Constitutional:       General: She is not in acute distress  Appearance: She is not ill-appearing or diaphoretic  HENT:      Head: Normocephalic and atraumatic  Nose: Nose normal  No congestion  Mouth/Throat:      Mouth: Mucous membranes are moist       Pharynx: Oropharynx is clear  Eyes:      Conjunctiva/sclera: Conjunctivae normal    Cardiovascular:      Rate and Rhythm: Normal rate  Pulmonary:      Effort: Pulmonary effort is normal  No respiratory distress  Breath sounds: Normal breath sounds     Abdominal:      General: Bowel sounds are normal  There is distension  Palpations: Abdomen is soft  Tenderness: There is no abdominal tenderness  Musculoskeletal:         General: No deformity or signs of injury  Cervical back: Neck supple  Skin:     General: Skin is warm and dry  Findings: No bruising or erythema  Neurological:      Mental Status: She is alert  Mental status is at baseline  Psychiatric:         Mood and Affect: Mood normal          Behavior: Behavior normal          Pertinent Laboratory/Diagnostic Studies:    11/2/22  hgb 12 3  Creat 0 97  K 4 5    Current Medications   Medications reviewed and updated in facility chart

## 2023-03-22 NOTE — ASSESSMENT & PLAN NOTE
Lab Results   Component Value Date    HGBA1C 8 0 (H) 11/02/2022     Continue current dose lantus/novolog  Continue monitoring blood sugars

## 2023-04-13 PROBLEM — R19.7 DIARRHEA: Status: ACTIVE | Noted: 2023-04-13

## 2023-05-23 ENCOUNTER — NURSING HOME VISIT (OUTPATIENT)
Dept: GERIATRICS | Facility: OTHER | Age: 83
End: 2023-05-23

## 2023-05-23 DIAGNOSIS — Z79.4 TYPE 2 DIABETES MELLITUS WITH HYPERGLYCEMIA, WITH LONG-TERM CURRENT USE OF INSULIN (HCC): Chronic | ICD-10-CM

## 2023-05-23 DIAGNOSIS — I48.91 ATRIAL FIBRILLATION, UNSPECIFIED TYPE (HCC): Primary | Chronic | ICD-10-CM

## 2023-05-23 DIAGNOSIS — E11.65 TYPE 2 DIABETES MELLITUS WITH HYPERGLYCEMIA, WITH LONG-TERM CURRENT USE OF INSULIN (HCC): Chronic | ICD-10-CM

## 2023-05-23 DIAGNOSIS — E05.90 HYPERTHYROIDISM: Chronic | ICD-10-CM

## 2023-05-23 DIAGNOSIS — R54 FRAILTY SYNDROME IN GERIATRIC PATIENT: Chronic | ICD-10-CM

## 2023-05-23 DIAGNOSIS — F31.30 BIPOLAR AFFECTIVE DISORDER, CURRENT EPISODE DEPRESSED, CURRENT EPISODE SEVERITY UNSPECIFIED (HCC): Chronic | ICD-10-CM

## 2023-05-23 DIAGNOSIS — Z86.718 HISTORY OF DVT (DEEP VEIN THROMBOSIS): Chronic | ICD-10-CM

## 2023-05-23 RX ORDER — OLANZAPINE 2.5 MG/1
2.5 TABLET ORAL
COMMUNITY
Start: 2023-03-28

## 2023-05-24 NOTE — PROGRESS NOTES
[address]  [address]  Facility: [address] [facility] and [address] [address]  Follow-up visit    NAME: Dixie Delacruz  AGE: 78 y o  SEX: female    DATE OF ENCOUNTER: 5/23/2023    Code status:  DNR/DNH    Assessment and Plan     1  Atrial fibrillation, unspecified type [facility])  Assessment & Plan:  · She is doing well with digoxin for rate control  · She is doing well with rivaroxaban for anticoagulation  · We will continue with this care plan  · We will continue with clinical and periodic laboratory monitoring for change in her condition      2  Type 2 diabetes mellitus with hyperglycemia, with long-term current use of insulin [facility])  Assessment & Plan:  · November 2, 2022: Hemoglobin A1c: 8%,   · Review of her fingerstick blood sugar log looks well with current insulins glargine and aspart  · We will update her hemoglobin A1c value  · We will target hemoglobin A1c less than 8% given frailty and comorbidities  · We will continue with clinical and periodic laboratory monitoring for change in her condition      3  Frailty syndrome in geriatric patient  Assessment & Plan:  · Secondary to her chronic medical conditions  · She continues to require 24/7 care/support of her ADLs  · I recommend continued care/support of her ADLs as a long-term resident at the nursing facility  · We will continue to monitor for change in her condition      4  Hyperthyroidism  Assessment & Plan:  · She is doing well with methimazole  · We will continue with clinical and periodic laboratory monitoring for change in her condition      5   Bipolar affective disorder, current episode depressed, current episode severity unspecified [facility])  Assessment & Plan:  · Nursing staff reports that she is doing well with her current psychotropic medication regimen  · We will continue her care in collaboration with the facility psychiatry service  · We will continue with monitoring for change in her condition      6  History of DVT (deep vein thrombosis)  Assessment & Plan:  · She continues on lifelong rivaroxaban therapy secondary to decreased mobility and high likelihood of recurrence  · We will continue with clinical and periodic laboratory monitoring for change in her condition      See my note of January 20, 2023 for further information  All medications and routine orders were reviewed and updated as needed  Plan discussed with: Nursing staff  Chief Complaint     She is seen for a follow-up visit to update the care and treatment of her chronic medical conditions  History of Present Illness     She is an 23-year-old woman who is seen with female nursing staff for a follow-up visit to update the care and treatment of her atrial fibrillation, insulin requiring type 2 diabetes mellitus, hyperthyroidism, and frailty secondary to her chronic medical conditions  When asked, she states all is good and she denies chest pain and shortness of breath  Nursing staff reports that her overall clinical status is stable, that her meal completion is stable, that she is sleeping well, and that she is having no difficulty with constipation  Nursing staff reports that she is not exhibiting any behaviors that are distressing to her or disruptive to the nursing unit  The following portions of the patient's history were reviewed and updated as appropriate: current medications, past family history, past medical history, past social history, past surgical history and problem list     Allergies: Allergies   Allergen Reactions   • Risperdal [Risperidone]        Review of Systems     Review of Systems   Respiratory: Negative for shortness of breath  Cardiovascular: Negative for chest pain  Medications and orders     All medications reviewed and updated in Nursing Home EMR        Objective     Vitals: Multivalve: Weight 171 5 pounds, pulse 82, blood pressure 134/72, fasting fingerstick "blood sugar 85  Physical Exam  Vitals reviewed  Exam conducted with a chaperone present  Constitutional:       General: She is awake  She is not in acute distress  Appearance: She is well-developed  She is not toxic-appearing or diaphoretic  Comments: She appears comfortable lying in bed, her stated age, and very frail  Cardiovascular:      Rate and Rhythm: Normal rate  Rhythm irregular  Heart sounds: Normal heart sounds  No murmur heard  No friction rub  No gallop  Comments: There is no edema in her legs  Pulmonary:      Effort: Pulmonary effort is normal  No respiratory distress  Breath sounds: Normal breath sounds  No stridor  No wheezing, rhonchi or rales  Abdominal:      General: There is distension  Palpations: Abdomen is soft  There is no mass  Tenderness: There is no abdominal tenderness  There is no guarding or rebound  Neurological:      Mental Status: She is alert  Psychiatric:         Behavior: Behavior is cooperative  Pertinent Laboratory/Diagnostic Studies: The following labs were reviewed please see chart or hospital paperwork for details  April 13, 2023:    BMP: Sodium 142, potassium 4, BUN 14, creatinine 0 91, fasting blood sugar 89, EGFR 62    CBC with differential: WBC count 7 3, hemoglobin 12 2, hematocrit 36 5, platelet count 242,919, MCV 77    - Her order summary was reviewed and signed  Portions of the record may have been created with voice recognition software  Occasional wrong word or \"sound a like\" substitutions may have occurred due to the inherent limitations of voice recognition software  Read the chart carefully and recognize, using context, where substitutions have occurred      NANY Crockett   5/23/2023 11:32 PM      "

## 2023-05-24 NOTE — ASSESSMENT & PLAN NOTE
· She is doing well with digoxin for rate control  · She is doing well with rivaroxaban for anticoagulation  · We will continue with this care plan  · We will continue with clinical and periodic laboratory monitoring for change in her condition

## 2023-05-24 NOTE — ASSESSMENT & PLAN NOTE
· She is doing well with methimazole  · We will continue with clinical and periodic laboratory monitoring for change in her condition

## 2023-05-24 NOTE — ASSESSMENT & PLAN NOTE
· Nursing staff reports that she is doing well with her current psychotropic medication regimen  · We will continue her care in collaboration with the facility psychiatry service  · We will continue with monitoring for change in her condition

## 2023-05-24 NOTE — ASSESSMENT & PLAN NOTE
· November 2, 2022: Hemoglobin A1c: 8%,   · Review of her fingerstick blood sugar log looks well with current insulins glargine and aspart  · We will update her hemoglobin A1c value  · We will target hemoglobin A1c less than 8% given frailty and comorbidities  · We will continue with clinical and periodic laboratory monitoring for change in her condition

## 2023-05-24 NOTE — ASSESSMENT & PLAN NOTE
· She continues on lifelong rivaroxaban therapy secondary to decreased mobility and high likelihood of recurrence  · We will continue with clinical and periodic laboratory monitoring for change in her condition

## 2023-06-12 ENCOUNTER — NURSING HOME VISIT (OUTPATIENT)
Dept: GERIATRICS | Facility: OTHER | Age: 83
End: 2023-06-12
Payer: MEDICARE

## 2023-06-12 DIAGNOSIS — R05.9 COUGH, UNSPECIFIED TYPE: Primary | ICD-10-CM

## 2023-06-12 PROCEDURE — 99308 SBSQ NF CARE LOW MDM 20: CPT | Performed by: PHYSICIAN ASSISTANT

## 2023-06-15 PROBLEM — R05.9 COUGH: Status: ACTIVE | Noted: 2023-06-15

## 2023-06-15 NOTE — ASSESSMENT & PLAN NOTE
Nursing reported cough overnight  Patient denies cough this AM  Afebrile  Sat 95 on RA  Order CXR and geritussin

## 2023-06-15 NOTE — PROGRESS NOTES
[facility]  [phone]) [address]  Code  32      NAME: Luis Gant  AGE: 78 y o  SEX: female [id number]    DATE OF ENCOUNTER: 6/12/23    CODE STATUS: DNR    Assessment and Plan     Problem List Items Addressed This Visit        Other    Cough - Primary     Nursing reported cough overnight  Patient denies cough this AM  Afebrile  Sat 95 on RA  Order CXR and geritussin            No orders of the defined types were placed in this encounter  Chief Complaint   No chief complaint on file  History of Present Illness   78year old female being seen today in collaboration with nursing for complaints of cough last night  Patient denies cough this AM  No fever  Otherwise feeling well       The following portions of the patient's history were reviewed and updated as appropriate: allergies, current medications, past family history, past medical history, past social history, past surgical history and problem list     Review of Systems   Review of Systems   Constitutional: Negative  Respiratory: Positive for cough  Negative for shortness of breath  Cardiovascular: Negative  Psychiatric/Behavioral: Negative             Active Problem List     Patient Active Problem List   Diagnosis   • Diabetes mellitus (HCC)   • Chronic pulmonary embolism without acute cor pulmonale (HCC)   • History of DVT (deep vein thrombosis)   • Atrial fibrillation (HCC)   • Bipolar disorder (HCC)   • Late onset Alzheimer's disease with behavioral disturbance ([facility] [address] )   • Hyperthyroidism   • Debility   • 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, of the knee   • Constipation   • Neurogenic bladder   • Other insomnia   • MDD (major depressive disorder)   • Other chronic pain   • Neuropathy   • Goals of care, counseling/discussion   • Chronic suprapubic catheter (HCC)   • Xerosis cutis   • Medication management   • Abdominal distension   • DNR (do not resuscitate)   • Frailty syndrome in geriatric patient   • Dry eye   • Hypokalemia   • Diarrhea   • Cough         Objective     There were no vitals taken for this visit  Physical Exam  Vitals reviewed  Constitutional:       General: She is not in acute distress  Appearance: She is not ill-appearing or diaphoretic  Cardiovascular:      Rate and Rhythm: Normal rate  Pulmonary:      Effort: Pulmonary effort is normal  No respiratory distress  Skin:     General: Skin is warm and dry  Neurological:      Mental Status: She is alert  Mental status is at baseline  Pertinent Laboratory/Diagnostic Studies:    CXR pending    Current Medications   Medications reviewed and updated in facility chart

## 2023-07-21 ENCOUNTER — NURSING HOME VISIT (OUTPATIENT)
Dept: GERIATRICS | Facility: OTHER | Age: 83
End: 2023-07-21
Payer: MEDICARE

## 2023-07-21 DIAGNOSIS — G62.9 NEUROPATHY: ICD-10-CM

## 2023-07-21 DIAGNOSIS — Z93.59 CHRONIC SUPRAPUBIC CATHETER (HCC): ICD-10-CM

## 2023-07-21 DIAGNOSIS — M11.269 CHONDROCALCINOSIS DUE TO DICALCIUM PHOSPHATE CRYSTALS, OF THE KNEE, UNSPECIFIED LATERALITY: ICD-10-CM

## 2023-07-21 DIAGNOSIS — F02.818 LATE ONSET ALZHEIMER'S DISEASE WITH BEHAVIORAL DISTURBANCE (HCC): Chronic | ICD-10-CM

## 2023-07-21 DIAGNOSIS — G30.1 LATE ONSET ALZHEIMER'S DISEASE WITH BEHAVIORAL DISTURBANCE (HCC): Chronic | ICD-10-CM

## 2023-07-21 DIAGNOSIS — E11.65 TYPE 2 DIABETES MELLITUS WITH HYPERGLYCEMIA, WITH LONG-TERM CURRENT USE OF INSULIN (HCC): Primary | Chronic | ICD-10-CM

## 2023-07-21 DIAGNOSIS — I27.82 CHRONIC PULMONARY EMBOLISM WITHOUT ACUTE COR PULMONALE, UNSPECIFIED PULMONARY EMBOLISM TYPE (HCC): ICD-10-CM

## 2023-07-21 DIAGNOSIS — E05.90 HYPERTHYROIDISM: Chronic | ICD-10-CM

## 2023-07-21 DIAGNOSIS — I48.91 ATRIAL FIBRILLATION, UNSPECIFIED TYPE (HCC): Chronic | ICD-10-CM

## 2023-07-21 DIAGNOSIS — Z79.4 TYPE 2 DIABETES MELLITUS WITH HYPERGLYCEMIA, WITH LONG-TERM CURRENT USE OF INSULIN (HCC): Primary | Chronic | ICD-10-CM

## 2023-07-21 DIAGNOSIS — Z66 DNR (DO NOT RESUSCITATE): ICD-10-CM

## 2023-07-21 PROCEDURE — 99309 SBSQ NF CARE MODERATE MDM 30: CPT | Performed by: PHYSICIAN ASSISTANT

## 2023-07-23 VITALS
SYSTOLIC BLOOD PRESSURE: 135 MMHG | TEMPERATURE: 98.3 F | BODY MASS INDEX: 24.37 KG/M2 | HEART RATE: 78 BPM | WEIGHT: 165 LBS | DIASTOLIC BLOOD PRESSURE: 51 MMHG

## 2023-07-23 PROBLEM — R05.9 COUGH: Status: RESOLVED | Noted: 2023-06-15 | Resolved: 2023-07-23

## 2023-07-23 PROBLEM — R19.7 DIARRHEA: Status: RESOLVED | Noted: 2023-04-13 | Resolved: 2023-07-23

## 2023-07-23 NOTE — PROGRESS NOTES
[address]  [phone] [place]  Code 32      NAME: Stepan Mccracken  AGE: 78 y.o. SEX: female [id number]    DATE OF ENCOUNTER: 7/21/23    CODE STATUS: DNR/DNH    Assessment and Plan     Problem List Items Addressed This Visit        Endocrine    Diabetes mellitus ([address]) - Primary (Chronic)       Lab Results   Component Value Date    HGBA1C 8.0 (H) 11/02/2022     Review of blood sugar log, stable  Continue current dose lantus/novolog         Hyperthyroidism (Chronic)     Doing well on methimazole  Continue periodic lab monitoring            Cardiovascular and Mediastinum    Atrial fibrillation (HCC) (Chronic)     Rate controlled on digoxin  Continue xarelto for [facility]         Chronic pulmonary embolism without acute cor pulmonale (HCC)     Continue lifelong xarelto            Nervous and Auditory    Late onset Alzheimer's disease with behavioral disturbance (HCC) (Chronic)     Mood stable  Doing well in long term care facility  Requires 24/7 care   Follows with psychiatry service  Continue current medication regimen including duloxetine, zyprexa  Continue supportive care         Neuropathy     Continue gabapentin            Musculoskeletal and Integument    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 Chief Complaint     Chief Complaint   Patient presents with   • Geriatric Evaluation     Follow up       History of Present Illness   78year old female resident of [address] [place] [address] being seen today in collaboration with nursing for follow up for chronic conditions. Patient feels well, has no new complaints.  Nursing has no concerns       The following portions of the patient's history were reviewed and updated as appropriate: allergies, current medications, past family history, past medical history, past social history, past surgical history and problem list.    Review of Systems   Review of Systems   Constitutional: Negative. HENT: Negative. Eyes: Negative. Respiratory: Negative. Cardiovascular: Negative. Gastrointestinal: Positive for abdominal distention. Endocrine: Negative. Genitourinary:        Chronic suprapubic cath   Musculoskeletal: Positive for arthralgias and gait problem. Allergic/Immunologic: Negative. Hematological: Negative. Psychiatric/Behavioral: Negative. 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 Constitutional:       General: She is not in acute distress. Appearance: She is not ill-appearing or diaphoretic. HENT:      Head: Normocephalic and atraumatic. Nose: Nose normal.      Mouth/Throat:      Pharynx: Oropharynx is clear.    Eyes:      Conjunctiva/sclera: Conjunctivae normal.   Cardiovascular:      Rate and Rhythm: Normal rate. Pulmonary:      Effort: Pulmonary effort is normal. No respiratory distress. Abdominal:      General: Bowel sounds are normal. There is distension. Tenderness: There is no abdominal tenderness. Comments: Chronically distended at baseline   Musculoskeletal:         General: No deformity or signs of injury. Skin:     General: Skin is warm and dry. Findings: No bruising or erythema. Neurological:      Mental Status: She is alert. Mental status is at baseline. Psychiatric:         Mood and Affect: Mood normal.         Behavior: Behavior normal.         Pertinent Laboratory/Diagnostic Studies:  CBC:   Lab Results   Component Value Date/Time    WBC 5.76 08/24/2017 07:34 AM    RBC 4.06 08/24/2017 07:34 AM    HGB 11.9 08/24/2017 07:34 AM    HCT 36.9 08/24/2017 07:34 AM    MCV 91 08/24/2017 07:34 AM    MCH 29.3 08/24/2017 07:34 AM    MCHC 32.2 08/24/2017 07:34 AM    RDW 13.6 08/24/2017 07:34 AM    MPV 9.8 08/24/2017 07:34 AM     08/24/2017 07:34 AM    NRBC 0 08/24/2017 07:34 AM    NEUTOPHILPCT 63 08/24/2017 07:34 AM    LYMPHOPCT 25 08/24/2017 07:34 AM    MONOPCT 9 08/24/2017 07:34 AM    EOSPCT 3 08/24/2017 07:34 AM    BASOPCT 0 08/24/2017 07:34 AM    NEUTROABS 3.61 08/24/2017 07:34 AM    LYMPHSABS 1.41 08/24/2017 07:34 AM    MONOSABS 0.54 08/24/2017 07:34 AM    EOSABS 0.16 08/24/2017 07:34 AM     Chemistry Profile:   Lab Results   Component Value Date/Time    K 4.0 08/24/2017 07:34 AM     08/24/2017 07:34 AM    CO2 24 08/24/2017 07:34 AM    BUN 15 08/24/2017 07:34 AM    CREATININE 0.66 08/24/2017 07:34 AM    GLUC 129 08/24/2017 07:34 AM    CALCIUM 8.6 08/24/2017 07:34 AM    MG 1.7 05/27/2016 07:28 PM    AST 17 08/24/2017 07:34 AM    ALT 13 08/24/2017 07:34 AM    ALKPHOS 61 08/24/2017 07:34 AM    EGFR 85 08/24/2017 07:34 AM         Current Medications   Medications reviewed and updated in facility chart.

## 2023-07-23 NOTE — ASSESSMENT & PLAN NOTE
Mood stable  Doing well in long term care facility  Requires 24/7 care   Follows with psychiatry service  Continue current medication regimen including duloxetine, zyprexa  Continue supportive care

## 2023-07-23 NOTE — ASSESSMENT & PLAN NOTE
Lab Results   Component Value Date    HGBA1C 8.0 (H) 11/02/2022     Review of blood sugar log, stable  Continue current dose lantus/novolog

## 2023-07-23 NOTE — ASSESSMENT & PLAN NOTE
Rate controlled on digoxin  [facility]

## 2023-09-26 ENCOUNTER — NURSING HOME VISIT (OUTPATIENT)
Dept: GERIATRICS | Facility: OTHER | Age: 83
End: 2023-09-26
Payer: MEDICARE

## 2023-09-26 DIAGNOSIS — E11.9 TYPE 2 DIABETES MELLITUS WITHOUT COMPLICATION, WITH LONG-TERM CURRENT USE OF INSULIN (HCC): Chronic | ICD-10-CM

## 2023-09-26 DIAGNOSIS — Z66 DNR (DO NOT RESUSCITATE): ICD-10-CM

## 2023-09-26 DIAGNOSIS — R54 FRAILTY SYNDROME IN GERIATRIC PATIENT: Chronic | ICD-10-CM

## 2023-09-26 DIAGNOSIS — F31.30 BIPOLAR AFFECTIVE DISORDER, CURRENT EPISODE DEPRESSED, CURRENT EPISODE SEVERITY UNSPECIFIED (HCC): Chronic | ICD-10-CM

## 2023-09-26 DIAGNOSIS — I48.91 ATRIAL FIBRILLATION, UNSPECIFIED TYPE (HCC): Primary | Chronic | ICD-10-CM

## 2023-09-26 DIAGNOSIS — Z79.4 TYPE 2 DIABETES MELLITUS WITHOUT COMPLICATION, WITH LONG-TERM CURRENT USE OF INSULIN (HCC): Chronic | ICD-10-CM

## 2023-09-26 DIAGNOSIS — E05.90 HYPERTHYROIDISM: Chronic | ICD-10-CM

## 2023-09-26 PROCEDURE — 99309 SBSQ NF CARE MODERATE MDM 30: CPT | Performed by: INTERNAL MEDICINE

## 2023-09-27 PROBLEM — R53.81 DEBILITY: Status: RESOLVED | Noted: 2019-06-11 | Resolved: 2023-09-27

## 2023-09-27 NOTE — ASSESSMENT & PLAN NOTE
· She is doing well with digoxin for rate control  · She is doing well with rivaroxaban for anticoagulation  · We will continue with clinical and periodic laboratory monitoring for change in her condition

## 2023-09-27 NOTE — ASSESSMENT & PLAN NOTE
· Review of her fingerstick blood sugar log looks well with her current insulins glargine and aspart  · We will target a hemoglobin A1c between 7.5% and 8% given her frailty and comorbidities  · We will continue with clinical and periodic laboratory monitoring for change in her condition

## 2023-09-27 NOTE — PROGRESS NOTES
[address] [place] [address] [id number] [place] [address], [address] [place] [address]  Facility: [address]  32  Follow-up visit    NAME: Bryan Joya  AGE: 78 y.o. SEX: female    DATE OF ENCOUNTER: September 26, 2023. Code status:  DNR/DNH    Assessment and Plan     1. Atrial fibrillation, unspecified type [facility])  Assessment & Plan:  · She is doing well with digoxin for rate control  · She is doing well with rivaroxaban for anticoagulation  · We will continue with clinical and periodic laboratory monitoring for change in her condition      2. Frailty syndrome in geriatric patient  Assessment & Plan:  · Secondary to her chronic medical conditions  · She continues to require 24/7 care/support of her ADLs  · I recommend continued care/support of her ADLs as a long-term resident at the nursing facility  · We will continue to monitor for change in her condition      3. Hyperthyroidism  Assessment & Plan:  · She is doing well with methimazole  · We will continue with clinical and periodic laboratory monitoring for change in her condition      4. Type 2 diabetes mellitus without complication, with long-term current use of insulin ([facility])  Assessment & Plan:  · Review of her fingerstick blood sugar log looks well with her current insulins glargine and aspart  · We will target a hemoglobin A1c between 7.5% and 8% given her frailty and comorbidities  · We will continue with clinical and periodic laboratory monitoring for change in her condition      5. Bipolar affective disorder, current episode depressed, current episode severity unspecified [facility])  Assessment & Plan:  · Nursing staff reports she is doing well with her current psychotropic medication regimen  · We will continue her care in collaboration with the facility psychiatry service  · We will continue with monitoring for change in her condition      6.  DNR (do not resuscitate)    See my note of May 23, 2023 for further information. All medications and routine orders were reviewed and updated as needed. Plan discussed with: Nursing staff. Chief Complaint     She is seen for a follow-up visit to update the care and treatment of her chronic medical conditions. History of Present Illness     She is an 70-year-old woman who is seen with female nursing staff for a follow-up visit to update the care and treatment of her chronic medical conditions, including atrial fibrillation, frailty secondary to her chronic medical conditions, hypothyroidism, and insulin requiring type 2 diabetes mellitus. Initially, she reported having no complaints for me during the visit. She then reported having discomfort in both of her legs. She is unable to describe Multi vitals: Weight 167.5 pounds the type of pain. It appears that the discomfort is mostly below her knees. The following portions of the patient's history were reviewed and updated as appropriate: current medications, past family history, past medical history, past social history, past surgical history and problem list.    Allergies: Allergies   Allergen Reactions   • Risperdal [Risperidone]        Review of Systems     Review of Systems   Unable to perform ROS: Psychiatric disorder       Medications and orders     All medications reviewed and updated in senior living EMR. Objective     Vitals:, Pulse 82, blood pressure 124/70, fasting fingerstick blood sugar 133. Physical Exam  Vitals reviewed. Exam conducted with a chaperone present. Constitutional:       General: She is awake. She is not in acute distress. Appearance: She is well-developed. She is not toxic-appearing or diaphoretic. Comments: She appears comfortable lying in bed, her stated age, and frail. Cardiovascular:      Rate and Rhythm: Normal rate and regular rhythm. Heart sounds: Normal heart sounds. No murmur heard. No friction rub. No gallop. Comments:  There is no edema in her legs.  Pulmonary:      Effort: No respiratory distress. Breath sounds: Normal breath sounds. No stridor. No wheezing, rhonchi or rales. Neurological:      Mental Status: She is alert. Psychiatric:         Behavior: Behavior is cooperative. - Her order summary was reviewed and signed. Portions of the record may have been created with voice recognition software. Occasional wrong word or "sound a like" substitutions may have occurred due to the inherent limitations of voice recognition software. Read the chart carefully and recognize, using context, where substitutions have occurred.     Maximino Bowers M.D.  9/27/2023 7:06 AM

## 2023-09-27 NOTE — ASSESSMENT & PLAN NOTE
· Nursing staff reports she is doing well with her current psychotropic medication regimen  · We will continue her care in collaboration with the facility psychiatry service  · We will continue with monitoring for change in her condition

## 2023-09-28 ENCOUNTER — NURSING HOME VISIT (OUTPATIENT)
Dept: GERIATRICS | Facility: OTHER | Age: 83
End: 2023-09-28
Payer: MEDICARE

## 2023-09-28 VITALS
TEMPERATURE: 97.5 F | HEART RATE: 82 BPM | OXYGEN SATURATION: 94 % | BODY MASS INDEX: 24.74 KG/M2 | SYSTOLIC BLOOD PRESSURE: 136 MMHG | RESPIRATION RATE: 22 BRPM | WEIGHT: 167.5 LBS | DIASTOLIC BLOOD PRESSURE: 86 MMHG

## 2023-09-28 DIAGNOSIS — Z66 DNR (DO NOT RESUSCITATE): Primary | ICD-10-CM

## 2023-09-28 DIAGNOSIS — U07.1 COVID: ICD-10-CM

## 2023-09-28 PROCEDURE — 99308 SBSQ NF CARE LOW MDM 20: CPT | Performed by: PHYSICIAN ASSISTANT

## 2023-09-29 NOTE — PROGRESS NOTES
[address]  [phone] [address] [place]  Code  32      NAME: Robert Braga  AGE: 78 y.o. SEX: female [phone]    DATE OF ENCOUNTER: 9/28/2023    CODE STATUS: DNR    Assessment and Plan     Problem List Items Addressed This Visit        Other    DNR (do not resuscitate) - Primary    COVID     Diagnosed 9/27/23 by facility testing  Did have vomiting yesterday but none today  Afebrile  O2 sat 94% on room air  Appetite good, ate well today  Order paxlovid, robitussin PRN, encourage PO fluids, monitor sats closely            No orders of the defined types were placed in this encounter. Chief Complaint     Chief Complaint   Patient presents with   • Geriatric Evaluation     covid       History of Present Illness   78year old female resident of [address] [place] [address] being seen today after she tested positive for covid yesterday. She had vomiting yesterday but this resolved. She is currently eating lunch and tells me she enjoys her pizza. Afebrile. sats stable on room air       The following portions of the patient's history were reviewed and updated as appropriate: allergies, current medications, past family history, past medical history, past social history, past surgical history and problem list.    Review of Systems   Review of Systems   Constitutional: Negative. Respiratory: Negative. Gastrointestinal: Positive for abdominal distention. Psychiatric/Behavioral: Positive for confusion.           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 Constitutional:       General: She is not in acute distress. Appearance: She is not ill-appearing or diaphoretic. Cardiovascular:      Rate and Rhythm: Normal rate. Pulmonary:      Effort: Pulmonary effort is normal. No respiratory distress. Breath sounds: Normal breath sounds. Abdominal:      Comments: Chronically distended at baseline   Skin:     General: Skin is warm and dry. Findings: No bruising or erythema. Neurological:      Mental Status: She is alert. Mental status is at baseline. Pertinent Laboratory/Diagnostic Studies:    WBC 8.6  Creat 0.89  K 3.8    Current Medications   Medications reviewed and updated in facility chart.

## 2023-09-29 NOTE — ASSESSMENT & PLAN NOTE
Diagnosed 9/27/23 by facility testing  Did have vomiting yesterday but none today  Afebrile  O2 sat 94% on room air  Appetite good, ate well today  Order paxlovid, robitussin PRN, encourage PO fluids, monitor sats closely

## 2023-11-05 ENCOUNTER — TELEPHONE (OUTPATIENT)
Dept: OTHER | Facility: OTHER | Age: 83
End: 2023-11-05

## 2023-11-20 ENCOUNTER — NURSING HOME VISIT (OUTPATIENT)
Dept: GERIATRICS | Facility: OTHER | Age: 83
End: 2023-11-20
Payer: MEDICARE

## 2023-11-20 VITALS
DIASTOLIC BLOOD PRESSURE: 64 MMHG | HEART RATE: 76 BPM | SYSTOLIC BLOOD PRESSURE: 103 MMHG | TEMPERATURE: 97.8 F | WEIGHT: 163.7 LBS | BODY MASS INDEX: 24.17 KG/M2

## 2023-11-20 DIAGNOSIS — G30.1 LATE ONSET ALZHEIMER'S DISEASE WITH BEHAVIORAL DISTURBANCE (HCC): Chronic | ICD-10-CM

## 2023-11-20 DIAGNOSIS — I48.91 ATRIAL FIBRILLATION, UNSPECIFIED TYPE (HCC): Chronic | ICD-10-CM

## 2023-11-20 DIAGNOSIS — Z66 DNR (DO NOT RESUSCITATE): ICD-10-CM

## 2023-11-20 DIAGNOSIS — E11.9 TYPE 2 DIABETES MELLITUS WITHOUT COMPLICATION, WITH LONG-TERM CURRENT USE OF INSULIN (HCC): Primary | Chronic | ICD-10-CM

## 2023-11-20 DIAGNOSIS — E05.90 HYPERTHYROIDISM: Chronic | ICD-10-CM

## 2023-11-20 DIAGNOSIS — G62.9 NEUROPATHY: ICD-10-CM

## 2023-11-20 DIAGNOSIS — F02.818 LATE ONSET ALZHEIMER'S DISEASE WITH BEHAVIORAL DISTURBANCE (HCC): Chronic | ICD-10-CM

## 2023-11-20 DIAGNOSIS — Z79.4 TYPE 2 DIABETES MELLITUS WITHOUT COMPLICATION, WITH LONG-TERM CURRENT USE OF INSULIN (HCC): Primary | Chronic | ICD-10-CM

## 2023-11-20 DIAGNOSIS — M11.269 CHONDROCALCINOSIS DUE TO DICALCIUM PHOSPHATE CRYSTALS, OF THE KNEE, UNSPECIFIED LATERALITY: ICD-10-CM

## 2023-11-20 DIAGNOSIS — N31.9 NEUROGENIC BLADDER: ICD-10-CM

## 2023-11-20 DIAGNOSIS — E87.6 HYPOKALEMIA: Chronic | ICD-10-CM

## 2023-11-20 PROBLEM — U07.1 COVID: Status: RESOLVED | Noted: 2023-09-28 | Resolved: 2023-11-20

## 2023-11-20 PROCEDURE — 99309 SBSQ NF CARE MODERATE MDM 30: CPT | Performed by: PHYSICIAN ASSISTANT

## 2023-11-20 NOTE — PROGRESS NOTES
[address]  [phone] [address] [place]  Code 32      NAME: Mike Carlson  AGE: 78 y.o. SEX: female [phone]    DATE OF ENCOUNTER: 11/20/2023    CODE STATUS: DNR    Assessment and Plan     Problem List Items Addressed This Visit          Endocrine    Diabetes mellitus ([facility] [address] [facility]) - Primary (Chronic)       Lab Results   Component Value Date    HGBA1C 8.0 (H) 11/02/2022   Reviewed blood sugar log, frequent AM fasting blood sugars in 70's/80's  Will decrease lantus dose from 45 to 42 units  Keep novolog dose at 15 units TID with meals  Continue to monitor         Hyperthyroidism (Chronic)     Continue methimazole              Cardiovascular and Mediastinum    Atrial fibrillation (HCC) (Chronic)     Continue digoxin  Continue xarelto            Nervous and Auditory    Late onset Alzheimer's disease with behavioral disturbance (HCC) (Chronic)     Follows with psychiatry service  Continue duloxetine, gabapentin, olanzapine          Neuropathy     Continue gabapentin            Musculoskeletal and Integument    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 Chief Complaint     Chief Complaint   Patient presents with    Geriatric Evaluation     Follow up       History of Present Illness   78year old female resident of [address] [place] [address] being seen today in collaboration with nursing for follow up for chronic conditions. She is tearful today, states she needs to "help her mother". She is easily re-directed.  Otherwise she has no complaints         The following portions of the patient's history were reviewed and updated as appropriate: allergies, current medications, past family history, past medical history, past social history, past surgical history and problem list.    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 Constitutional:       General: She is not in acute distress. Appearance: She is not ill-appearing or diaphoretic. HENT:      Head: Normocephalic and atraumatic. Nose: Nose normal.      Mouth/Throat:      Pharynx: Oropharynx is clear. Cardiovascular:      Rate and Rhythm: Normal rate. Pulmonary:      Effort: Pulmonary effort is normal. No respiratory distress. Abdominal:      General: Bowel sounds are normal. There is distension. Tenderness: There is no abdominal tenderness. Musculoskeletal:         General: No deformity or signs of injury. Right lower leg: No edema. Left lower leg: No edema. Skin:     General: Skin is warm and dry. Findings: No bruising or erythema. Neurological:      Mental Status: She is alert.  Mental status is at baseline. She is disoriented. Psychiatric:      Comments: Tearful but re-directable         Pertinent Laboratory/Diagnostic Studies:    11/6/23  Hgb 10.8  Creat 1.03    K 3.9    Current Medications   Medications reviewed and updated in facility chart.

## 2023-11-20 NOTE — ASSESSMENT & PLAN NOTE
Lab Results   Component Value Date    HGBA1C 8.0 (H) 11/02/2022     Reviewed blood sugar log, frequent AM fasting blood sugars in 70's/80's  Will decrease lantus dose from 45 to 42 units  Keep novolog dose at 15 units TID with meals  Continue to monitor

## 2023-12-06 ENCOUNTER — TELEPHONE (OUTPATIENT)
Dept: OTHER | Facility: OTHER | Age: 83
End: 2023-12-06

## 2023-12-15 NOTE — TELEPHONE ENCOUNTER
Guerline León from [facility] called in stating the pt's fasting blood sugar is 425. On call paged via TC.

## 2023-12-16 ENCOUNTER — TELEPHONE (OUTPATIENT)
Dept: OTHER | Facility: OTHER | Age: 83
End: 2023-12-16

## 2023-12-16 NOTE — TELEPHONE ENCOUNTER
Kandis Cronin from [facility] called in stating the pt's suprapubic catheter is leaking. On call paged via TC.

## 2023-12-18 NOTE — TELEPHONE ENCOUNTER
Called Richard  at [facility] and they spoke to a provider over the weekend and got orders to flush

## 2024-02-16 ENCOUNTER — TELEPHONE (OUTPATIENT)
Dept: UROLOGY | Facility: CLINIC | Age: 84
End: 2024-02-16

## 2024-02-16 NOTE — TELEPHONE ENCOUNTER
Patient upcoming appointment with Dr. Bruno at [place] 3/1/24 at 8:30 needs to be changed due to PI presentation.  I called Sofie [phone] and spoke to Rossi and made her aware that patient new appointment with Dr. Bruno at [place] is scheduled 3/11/24 at 8:30.

## 2024-02-23 ENCOUNTER — TELEPHONE (OUTPATIENT)
Dept: OTHER | Facility: OTHER | Age: 84
End: 2024-02-23

## 2024-02-23 RX ORDER — INSULIN ASPART 100 [IU]/ML
8 INJECTION, SOLUTION INTRAVENOUS; SUBCUTANEOUS
COMMUNITY
Start: 2024-02-15

## 2024-02-23 RX ORDER — INSULIN GLARGINE 100 [IU]/ML
25 INJECTION, SOLUTION SUBCUTANEOUS
COMMUNITY
Start: 2024-02-19

## 2024-02-24 NOTE — TELEPHONE ENCOUNTER
Patient's blood sugar was 34, Glucaten administered and blood sugar is now at 90    TT to on call provider

## 2024-03-11 ENCOUNTER — PROCEDURE VISIT (OUTPATIENT)
Dept: UROLOGY | Facility: CLINIC | Age: 84
End: 2024-03-11
Payer: MEDICARE

## 2024-03-11 VITALS — OXYGEN SATURATION: 90 % | HEART RATE: 94 BPM | DIASTOLIC BLOOD PRESSURE: 80 MMHG | SYSTOLIC BLOOD PRESSURE: 120 MMHG

## 2024-03-11 DIAGNOSIS — N31.9 NEUROGENIC BLADDER: Primary | ICD-10-CM

## 2024-03-11 PROCEDURE — 52000 CYSTOURETHROSCOPY: CPT | Performed by: UROLOGY

## 2024-03-11 NOTE — PROGRESS NOTES
Cystoscopy     Date/Time  3/11/2024 8:30 AM     Performed by  Kane Bruno MD   Authorized by  Kane Bruno MD          Josefina is an 83-year-old female with a history of neurogenic bladder and a chronic indwelling suprapubic tube.  She has been having cystoscopy every other year or so.  Her last cystoscopy was in March 2022.  She resides in [facility].  Based on my assessment she does not have the mental capacity to obtain consent.  I therefore spoke with her daughter Vale Rhodes via telephone today to obtain consent for cystoscopy and exchange of the suprapubic tube.  Risk and benefits of the procedure discussed and reviewed informed consent obtained verbally over the telephone.    The patient was left on her stretcher in a supine position.  Her indwelling suprapubic tube was removed.  Marked abdominal distention was noted.  The insertion site was prepped and draped in sterile fashion.  Flexible cystoscopy was then performed through the suprapubic tube tract.  The tract was normal.  The bladder was entered.  Both ureteral orifice ease were visualized as well as the bladder neck.  There was a small capacity bladder with inflammatory changes but no evidence of mucosal abnormality or lesion otherwise.  The bladder was filled to the capacity that she could handle and then the cystoscope was removed.  A finger was placed over the suprapubic tube tract to prevent egress of fluid and then I placed a new 24 Moroccan suprapubic tube.  Initially I placed 10 cc in the balloon but when the catheter flushed there was leakage around the tube.  With 20 cc in the balloon there was an adequate seal so that there was no leakage with manual irrigation.  The tube was then connected to gravity drainage.    Impression: Abdominal distention, neurogenic bladder, chronic indwelling suprapubic tube    Plan: Cystoscopic evaluation in the office today was performed and there was no significant  pathology identified.  I recommend maintaining the 24 Croatian suprapubic tube to gravity drainage.  This can be changed at the [facility] every 6 weeks to a new 24 Croatian tube.  Her next cystoscopy can be in 2 years time.  I recommend that the nursing facility address her abdominal distention.  Paperwork to return to her facility was completed with the above findings.

## 2024-03-11 NOTE — LETTER
March 11, 2024     Luiz Decker MD  [address] [facility]  [address] [place] [id number]    Patient: Josefina Castro   YOB: 1940   Date of Visit: 3/11/2024       Dear Dr. Decker:    Thank you for referring Josefina Castro to me for evaluation. Below are my notes for this consultation.    If you have questions, please do not hesitate to call me. I look forward to following your patient along with you.         Sincerely,        Kane Bruno MD        CC: No Recipients    Kane Bruno MD  3/11/2024  9:08 AM  Sign when Signing Visit     Cystoscopy     Date/Time  3/11/2024 8:30 AM     Performed by  Kane Bruno MD   Authorized by  Kane Bruno MD          Josefina is an 83-year-old female with a history of neurogenic bladder and a chronic indwelling suprapubic tube.  She has been having cystoscopy every other year or so.  Her last cystoscopy was in March 2022.  She resides in [facility].  Based on my assessment she does not have the mental capacity to obtain consent.  I therefore spoke with her daughter Vale Rhodes via telephone today to obtain consent for cystoscopy and exchange of the suprapubic tube.  Risk and benefits of the procedure discussed and reviewed informed consent obtained verbally over the telephone.    The patient was left on her stretcher in a supine position.  Her indwelling suprapubic tube was removed.  Marked abdominal distention was noted.  The insertion site was prepped and draped in sterile fashion.  Flexible cystoscopy was then performed through the suprapubic tube tract.  The tract was normal.  The bladder was entered.  Both ureteral orifice ease were visualized as well as the bladder neck.  There was a small capacity bladder with inflammatory changes but no evidence of mucosal abnormality or lesion otherwise.  The bladder was filled to the capacity that she could handle and then the cystoscope was removed.  A finger  was placed over the suprapubic tube tract to prevent egress of fluid and then I placed a new 24 Bruneian suprapubic tube.  Initially I placed 10 cc in the balloon but when the catheter flushed there was leakage around the tube.  With 20 cc in the balloon there was an adequate seal so that there was no leakage with manual irrigation.  The tube was then connected to gravity drainage.    Impression: Abdominal distention, neurogenic bladder, chronic indwelling suprapubic tube    Plan: Cystoscopic evaluation in the office today was performed and there was no significant pathology identified.  I recommend maintaining the 24 Bruneian suprapubic tube to gravity drainage.  This can be changed at the [facility] every 6 weeks to a new 24 Bruneian tube.  Her next cystoscopy can be in 2 years time.  I recommend that the nursing facility address her abdominal distention.  Paperwork to return to her facility was completed with the above findings.

## 2024-03-28 ENCOUNTER — DOCUMENTATION (OUTPATIENT)
Dept: GERIATRICS | Facility: OTHER | Age: 84
End: 2024-03-28

## 2024-03-28 DIAGNOSIS — Z66 DNR (DO NOT RESUSCITATE): ICD-10-CM

## 2024-03-28 DIAGNOSIS — I48.91 ATRIAL FIBRILLATION, UNSPECIFIED TYPE (HCC): Primary | Chronic | ICD-10-CM

## 2024-03-28 DIAGNOSIS — E11.9 TYPE 2 DIABETES MELLITUS WITHOUT COMPLICATION, WITH LONG-TERM CURRENT USE OF INSULIN (HCC): Chronic | ICD-10-CM

## 2024-03-28 DIAGNOSIS — E05.90 HYPERTHYROIDISM: Chronic | ICD-10-CM

## 2024-03-28 DIAGNOSIS — R54 FRAILTY SYNDROME IN GERIATRIC PATIENT: Chronic | ICD-10-CM

## 2024-03-28 DIAGNOSIS — Z79.4 TYPE 2 DIABETES MELLITUS WITHOUT COMPLICATION, WITH LONG-TERM CURRENT USE OF INSULIN (HCC): Chronic | ICD-10-CM

## 2024-05-30 ENCOUNTER — NURSING HOME VISIT (OUTPATIENT)
Dept: GERIATRICS | Facility: OTHER | Age: 84
End: 2024-05-30
Payer: MEDICARE

## 2024-05-30 DIAGNOSIS — F02.818 LATE ONSET ALZHEIMER'S DISEASE WITH BEHAVIORAL DISTURBANCE (HCC): Chronic | ICD-10-CM

## 2024-05-30 DIAGNOSIS — Z79.4 TYPE 2 DIABETES MELLITUS WITHOUT COMPLICATION, WITH LONG-TERM CURRENT USE OF INSULIN (HCC): Chronic | ICD-10-CM

## 2024-05-30 DIAGNOSIS — Z66 DNR (DO NOT RESUSCITATE): ICD-10-CM

## 2024-05-30 DIAGNOSIS — G30.1 LATE ONSET ALZHEIMER'S DISEASE WITH BEHAVIORAL DISTURBANCE (HCC): Chronic | ICD-10-CM

## 2024-05-30 DIAGNOSIS — Z93.59 CHRONIC SUPRAPUBIC CATHETER (HCC): ICD-10-CM

## 2024-05-30 DIAGNOSIS — I48.91 ATRIAL FIBRILLATION, UNSPECIFIED TYPE (HCC): Primary | Chronic | ICD-10-CM

## 2024-05-30 DIAGNOSIS — N31.9 NEUROGENIC BLADDER: ICD-10-CM

## 2024-05-30 DIAGNOSIS — E11.9 TYPE 2 DIABETES MELLITUS WITHOUT COMPLICATION, WITH LONG-TERM CURRENT USE OF INSULIN (HCC): Chronic | ICD-10-CM

## 2024-05-30 DIAGNOSIS — F31.30 BIPOLAR AFFECTIVE DISORDER, CURRENT EPISODE DEPRESSED, CURRENT EPISODE SEVERITY UNSPECIFIED (HCC): Chronic | ICD-10-CM

## 2024-05-30 DIAGNOSIS — R54 FRAILTY SYNDROME IN GERIATRIC PATIENT: Chronic | ICD-10-CM

## 2024-05-30 DIAGNOSIS — E05.90 HYPERTHYROIDISM: Chronic | ICD-10-CM

## 2024-05-30 PROCEDURE — 99309 SBSQ NF CARE MODERATE MDM 30: CPT | Performed by: INTERNAL MEDICINE

## 2024-07-25 ENCOUNTER — NURSING HOME VISIT (OUTPATIENT)
Dept: GERIATRICS | Facility: OTHER | Age: 84
End: 2024-07-25
Payer: MEDICARE

## 2024-07-25 DIAGNOSIS — Z66 DNR (DO NOT RESUSCITATE): ICD-10-CM

## 2024-07-25 DIAGNOSIS — R63.4 WEIGHT LOSS: ICD-10-CM

## 2024-07-25 DIAGNOSIS — R54 FRAILTY SYNDROME IN GERIATRIC PATIENT: Chronic | ICD-10-CM

## 2024-07-25 DIAGNOSIS — I48.91 ATRIAL FIBRILLATION, UNSPECIFIED TYPE (HCC): Chronic | ICD-10-CM

## 2024-07-25 DIAGNOSIS — E55.9 VITAMIN D DEFICIENCY: Primary | ICD-10-CM

## 2024-07-25 DIAGNOSIS — E11.9 TYPE 2 DIABETES MELLITUS WITHOUT COMPLICATION, WITH LONG-TERM CURRENT USE OF INSULIN (HCC): Chronic | ICD-10-CM

## 2024-07-25 DIAGNOSIS — Z93.59 CHRONIC SUPRAPUBIC CATHETER (HCC): ICD-10-CM

## 2024-07-25 DIAGNOSIS — E05.90 HYPERTHYROIDISM: Chronic | ICD-10-CM

## 2024-07-25 DIAGNOSIS — F02.818 LATE ONSET ALZHEIMER'S DISEASE WITH BEHAVIORAL DISTURBANCE (HCC): Chronic | ICD-10-CM

## 2024-07-25 DIAGNOSIS — F31.30 BIPOLAR AFFECTIVE DISORDER, CURRENT EPISODE DEPRESSED, CURRENT EPISODE SEVERITY UNSPECIFIED (HCC): Chronic | ICD-10-CM

## 2024-07-25 DIAGNOSIS — G30.1 LATE ONSET ALZHEIMER'S DISEASE WITH BEHAVIORAL DISTURBANCE (HCC): Chronic | ICD-10-CM

## 2024-07-25 DIAGNOSIS — Z79.4 TYPE 2 DIABETES MELLITUS WITHOUT COMPLICATION, WITH LONG-TERM CURRENT USE OF INSULIN (HCC): Chronic | ICD-10-CM

## 2024-07-25 DIAGNOSIS — N31.9 NEUROGENIC BLADDER: ICD-10-CM

## 2024-07-25 PROCEDURE — 99309 SBSQ NF CARE MODERATE MDM 30: CPT | Performed by: INTERNAL MEDICINE

## 2024-07-30 PROBLEM — R63.4 WEIGHT LOSS: Status: ACTIVE | Noted: 2024-07-30

## 2024-07-30 PROBLEM — E55.9 VITAMIN D DEFICIENCY: Status: ACTIVE | Noted: 2024-07-30

## 2024-07-30 RX ORDER — DULOXETIN HYDROCHLORIDE 20 MG/1
20 CAPSULE, DELAYED RELEASE ORAL DAILY
COMMUNITY
Start: 2024-05-15

## 2024-07-30 RX ORDER — GABAPENTIN 300 MG/1
300 CAPSULE ORAL
COMMUNITY
Start: 2022-09-24

## 2024-07-30 RX ORDER — ERGOCALCIFEROL 1.25 MG/1
50000 CAPSULE ORAL WEEKLY
COMMUNITY
Start: 2024-07-07 | End: 2024-09-01

## 2024-07-30 NOTE — ASSESSMENT & PLAN NOTE
Will continue to provide a safe, secure, structured, supportive environment at the nursing facility  Will continue with 24/7 care/support of her ADLs at the nursing facility  Will continue her care in collaboration with the facility psychiatry service  Will continue with monitoring for change in her condition

## 2024-07-30 NOTE — ASSESSMENT & PLAN NOTE
January 12, 2024: TSH: 1.03  She is doing well with her current dosage of methimazole  Will continue with clinical and periodic laboratory monitoring for change in her condition

## 2024-07-30 NOTE — ASSESSMENT & PLAN NOTE
She is doing well with digoxin for rate control  She is doing well with rivaroxaban for anticoagulation  Will continue with this care plan  Will continue with clinical and periodic laboratory monitoring for change in her condition

## 2024-07-30 NOTE — PROGRESS NOTES
[facility]  [address]  [place] [address]  Facility: [facility]  [facility]  30  Follow-up visit    NAME: Josefina Castro  AGE: 83 y.o. SEX: female    DATE OF ENCOUNTER: July 25, 2024.    Code status:   DNR/DNH    Assessment and Plan     1. Vitamin D deficiency  Assessment & Plan:  July 5, 2024: Vitamin D 25-hydroxy level: 16:  She is completing an 8-week course of vitamin D2 50,000 units weekly to be followed by vitamin D3 50 mcg daily  Will continue with periodic laboratory monitoring for change in her condition  2. Atrial fibrillation, unspecified type (HCC)  Assessment & Plan:  She continues on digoxin for rate control  She continues on rivaroxaban for anticoagulation  Will continue with this care plan  Will continue with clinical and periodic laboratory monitoring for change in her condition  3. Type 2 diabetes mellitus without complication, with long-term current use of insulin (HCC)  Assessment & Plan:  July 17, 2024: Hemoglobin A1c: 6.5%,   Will decrease her basal insulin to 20 units daily at bedtime and her preprandial insulin to 5 units 3 times daily with meals to avoid hypoglycemia  Will continue to target a hemoglobin A1c less than 8% given frailty and long-term care residence  Will continue with clinical and periodic laboratory monitoring for change in her condition  4. Frailty syndrome in geriatric patient  Assessment & Plan:  Secondary to her chronic medical conditions  She continues to require 24/7 care/support of her ADLs  I recommend continued care/support of her ADLs as a long-term resident at the nursing facility  Will continue to monitor for change in her condition  5. Weight loss  Assessment & Plan:  Review of her meal completion log shows variable completion of her meals  She is on Glucerna once daily  Will check follow-up laboratory testing  Will consult the facility dietary service  Will monitor her weight  weekly  Further recommendations, pending results of above care plan  6. Hyperthyroidism  Assessment & Plan:  January 12, 2024: TSH: 1.03  July 17, 2024: TSH: 1.78  She is doing well with her current dosage of methimazole  Will continue with clinical and periodic laboratory monitoring for change in her condition  7. Neurogenic bladder  Assessment & Plan:  Status post suprapubic catheter placement  Nursing staff to continue with local care and maintenance of the catheter  Will continue her care in collaboration with her urology service  8. Chronic suprapubic catheter (HCC)  Assessment & Plan:  Secondary to neurogenic bladder  Will continue her care in collaboration with her urology service having last been seen for a cystoscopy on March 11, 2024 with recommendation for a 24 French catheter to be changed every 6 weeks and as needed  Nursing staff to continue with local care and maintenance of her catheter  9. Late onset Alzheimer's disease with behavioral disturbance (HCC)  Assessment & Plan:  Will continue to provide a safe, secure, structured, and supportive environment at the nursing facility  Will continue with 24/7 care/support of her ADLs  Will continue her care in collaboration with the facility psychiatry service  Will continue with monitoring for change in her condition  10. Bipolar affective disorder, current episode depressed, current episode severity unspecified (HCC)  Assessment & Plan:  Nursing staff reports that her overall condition is stable on her current psychotropic medication regimen  Will continue her care in collaboration with the facility psychiatry service  11. DNR (do not resuscitate)    All medications and routine orders were reviewed and updated as needed.    Plan discussed with: Nursing staff.    Chief Complaint     She is seen for a follow-up visit to update the care and treatment of her chronic medical conditions.    History of Present Illness     She is an 83-year-old woman who is seen  with female nursing staff for a follow-up visit to update the care and treatment of her chronic medical conditions, including vitamin D deficiency, atrial fibrillation, insulin requiring type 2 diabetes mellitus, and frailty secondary to her chronic medical conditions.    Nursing staff reports that her overall clinical/functional status is stable, that her appetite is stable, that she is sleeping well, and that she is having no difficulty with her bowel movements.  Other than periodic yelling out, nursing staff reports that her behaviors are stable.    Review of her meal completion log shows variable completion of her meals.    When asked, the patient has no complaints.    The following portions of the patient's history were reviewed and updated as appropriate: current medications, past family history, past medical history, past social history, past surgical history and problem list.    Allergies:  Allergies   Allergen Reactions    Risperdal [Risperidone]        Review of Systems     Review of Systems   Unable to perform ROS: Dementia       Medications and orders     All medications reviewed and updated in half-way EMR.      Objective     Vitals: Recent vitals: Weight 147 pounds, pulse 62, blood pressure 142/84, fasting fingerstick blood sugar 121.    Physical Exam  Vitals reviewed. Exam conducted with a chaperone present.   Constitutional:       General: She is awake. She is not in acute distress.     Appearance: She is not toxic-appearing or diaphoretic.      Comments: She appears comfortable lying in bed, stated age, and frail.   Cardiovascular:      Rate and Rhythm: Normal rate and regular rhythm.      Heart sounds: Murmur (2/6 systolic ejection murmur) heard.      No friction rub. No gallop.   Pulmonary:      Effort: No respiratory distress.      Breath sounds: Normal breath sounds. No stridor. No wheezing, rhonchi or rales.   Musculoskeletal:      Right lower leg: No edema.      Left lower leg: No edema.  "  Neurological:      Mental Status: She is alert. She is confused.   Psychiatric:         Speech: Speech is tangential.         Behavior: Behavior is cooperative.       Pertinent Laboratory/Diagnostic Studies:     The following labs were reviewed please see chart or hospital paperwork for details.    July 5, 2024:    Intact PTH: 71.3    Phosphorus: 4    Vitamin D 25-hydroxy level: 16    July 17, 2024:    TSH: 1.78    Hemoglobin A1c: 6.5%,     - Her order summary was reviewed and signed.      Portions of the record may have been created with voice recognition software.  Occasional wrong word or \"sound a like\" substitutions may have occurred due to the inherent limitations of voice recognition software.  Read the chart carefully and recognize, using context, where substitutions have occurred.    Luiz Decker M.D.  7/30/2024 9:59 PM      "

## 2024-07-30 NOTE — PROGRESS NOTES
[facility]  [address]  [place] [address]  [facility]  [facility]  32  Follow-up visit    NAME: Josefina Castro  AGE: 83 y.o. SEX: female    DATE OF ENCOUNTER: May 30, 2024.    Code status:   DNR/DNH    Assessment and Plan     1. Atrial fibrillation, unspecified type (HCC)  Assessment & Plan:  She is doing well with digoxin for rate control  She is doing well with rivaroxaban for anticoagulation  Will continue with this care plan  Will continue with clinical and periodic laboratory monitoring for change in her condition  2. Type 2 diabetes mellitus without complication, with long-term current use of insulin (HCC)  Assessment & Plan:  January 12, 2024: Hemoglobin A1c: 5.5%,   Will continue with basal and preprandial insulins and adjust the dosages to target a hemoglobin A1c less than 8% given frailty and long-term care residence  Will continue with clinical and periodic laboratory monitoring for change in her condition  3. Frailty syndrome in geriatric patient  Assessment & Plan:  Secondary to her chronic medical conditions  She continues to require 24/7 care/support of her ADLs  I recommend continued care/support of her ADLs as a long-term resident at the nursing facility  Will continue to monitor for change in her condition  4. Hyperthyroidism  Assessment & Plan:  January 12, 2024: TSH: 1.03  She is doing well with her current dosage of methimazole  Will continue with clinical and periodic laboratory monitoring for change in her condition  5. Late onset Alzheimer's disease with behavioral disturbance (HCC)  Assessment & Plan:  Will continue to provide a safe, secure, structured, supportive environment at the nursing facility  Will continue with 24/7 care/support of her ADLs at the nursing facility  Will continue her care in collaboration with the facility psychiatry service  Will continue with monitoring for change in her  condition  6. Neurogenic bladder  Assessment & Plan:  She continues with suprapubic catheter in place  7. Chronic suprapubic catheter (HCC)  Assessment & Plan:  Place secondary to neurogenic bladder  Nursing staff to continue with local care and maintenance of the suprapubic catheter  Will continue her care in collaboration with her urology service  Will continue with monitoring for change in her condition  8. Bipolar affective disorder, current episode depressed, current episode severity unspecified (HCC)  Assessment & Plan:  Will continue her care in collaboration with the facility psychiatry service  9. DNR (do not resuscitate)    All medications and routine orders were reviewed and updated as needed.    Plan discussed with: Nursing staff.    Chief Complaint     She is seen for a follow-up visit to update care and treatment of her chronic medical conditions.    History of Present Illness     She is an 83-year-old woman who is seen for a follow-up visit to update the care and treatment of her chronic medical conditions, including atrial fibrillation, insulin requiring type 2 diabetes mellitus, frailty secondary to her chronic medical conditions, and hypothyroidism.    When asked, she has no complaints for me.  She wants to know how to get in touch with me.  I advised her to communicate with me via the nursing staff.    The following portions of the patient's history were reviewed and updated as appropriate: current medications, past family history, past medical history, past social history, past surgical history and problem list.    Allergies:  Allergies   Allergen Reactions    Risperdal [Risperidone]        Review of Systems     Review of Systems   Unable to perform ROS: Dementia       Medications and orders     All medications reviewed and updated in senior care EMR.    Objective     Vitals: Recent vitals: Weight 157.5 pounds, pulse 81, blood pressure 95/58, fasting fingerstick blood sugar 157.    Physical  "Exam  Vitals reviewed.   Cardiovascular:      Rate and Rhythm: Normal rate and regular rhythm.      Heart sounds: Normal heart sounds. No murmur heard.     No friction rub. No gallop.   Pulmonary:      Effort: No respiratory distress.      Breath sounds: Normal breath sounds. No stridor. No wheezing, rhonchi or rales.       Pertinent Laboratory/Diagnostic Studies:     The following labs were reviewed please see chart or hospital paperwork for details.    January 12, 2024:    Fasting lipid profile: Total cholesterol 118, triglycerides 159, HDL 32, LDL 54    Hemoglobin A1c: 5.5%,     TSH: 1.03    - Her order summary was reviewed and signed.      Portions of the record may have been created with voice recognition software.  Occasional wrong word or \"sound a like\" substitutions may have occurred due to the inherent limitations of voice recognition software.  Read the chart carefully and recognize, using context, where substitutions have occurred.    Luiz Decker M.D.  7/30/2024 6:58 PM      "

## 2024-07-30 NOTE — ASSESSMENT & PLAN NOTE
Place secondary to neurogenic bladder  Nursing staff to continue with local care and maintenance of the suprapubic catheter  Will continue her care in collaboration with her urology service  Will continue with monitoring for change in her condition

## 2024-07-30 NOTE — ASSESSMENT & PLAN NOTE
January 12, 2024: Hemoglobin A1c: 5.5%,   Will continue with basal and preprandial insulins and adjust the dosages to target a hemoglobin A1c less than 8% given frailty and long-term care residence  Will continue with clinical and periodic laboratory monitoring for change in her condition

## 2024-07-31 NOTE — ASSESSMENT & PLAN NOTE
January 12, 2024: TSH: 1.03  July 17, 2024: TSH: 1.78  She is doing well with her current dosage of methimazole  Will continue with clinical and periodic laboratory monitoring for change in her condition

## 2024-07-31 NOTE — ASSESSMENT & PLAN NOTE
July 17, 2024: Hemoglobin A1c: 6.5%,   Will decrease her basal insulin to 20 units daily at bedtime and her preprandial insulin to 5 units 3 times daily with meals to avoid hypoglycemia  Will continue to target a hemoglobin A1c less than 8% given frailty and long-term care residence  Will continue with clinical and periodic laboratory monitoring for change in her condition

## 2024-07-31 NOTE — ASSESSMENT & PLAN NOTE
Secondary to neurogenic bladder  Will continue her care in collaboration with her urology service having last been seen for a cystoscopy on March 11, 2024 with recommendation for a 24 French catheter to be changed every 6 weeks and as needed  Nursing staff to continue with local care and maintenance of her catheter

## 2024-07-31 NOTE — ASSESSMENT & PLAN NOTE
Will continue to provide a safe, secure, structured, and supportive environment at the nursing facility  Will continue with 24/7 care/support of her ADLs  Will continue her care in collaboration with the facility psychiatry service  Will continue with monitoring for change in her condition

## 2024-07-31 NOTE — ASSESSMENT & PLAN NOTE
Review of her meal completion log shows variable completion of her meals  She is on Glucerna once daily  Will check follow-up laboratory testing  Will consult the facility dietary service  Will monitor her weight weekly  Further recommendations, pending results of above care plan

## 2024-07-31 NOTE — ASSESSMENT & PLAN NOTE
Nursing staff reports that her overall condition is stable on her current psychotropic medication regimen  Will continue her care in collaboration with the facility psychiatry service

## 2024-07-31 NOTE — ASSESSMENT & PLAN NOTE
Status post suprapubic catheter placement  Nursing staff to continue with local care and maintenance of the catheter  Will continue her care in collaboration with her urology service

## 2024-07-31 NOTE — ASSESSMENT & PLAN NOTE
July 5, 2024: Vitamin D 25-hydroxy level: 16:  She is completing an 8-week course of vitamin D2 50,000 units weekly to be followed by vitamin D3 50 mcg daily  Will continue with periodic laboratory monitoring for change in her condition

## 2024-07-31 NOTE — ASSESSMENT & PLAN NOTE
She continues on digoxin for rate control  She continues on rivaroxaban for anticoagulation  Will continue with this care plan  Will continue with clinical and periodic laboratory monitoring for change in her condition

## 2024-09-20 ENCOUNTER — NURSING HOME VISIT (OUTPATIENT)
Dept: GERIATRICS | Facility: OTHER | Age: 84
End: 2024-09-20
Payer: COMMERCIAL

## 2024-09-20 DIAGNOSIS — R54 FRAILTY SYNDROME IN GERIATRIC PATIENT: Chronic | ICD-10-CM

## 2024-09-20 DIAGNOSIS — I27.82 CHRONIC PULMONARY EMBOLISM WITHOUT ACUTE COR PULMONALE, UNSPECIFIED PULMONARY EMBOLISM TYPE (HCC): ICD-10-CM

## 2024-09-20 DIAGNOSIS — M11.269 CHONDROCALCINOSIS DUE TO DICALCIUM PHOSPHATE CRYSTALS, OF THE KNEE, UNSPECIFIED LATERALITY: ICD-10-CM

## 2024-09-20 DIAGNOSIS — E11.9 TYPE 2 DIABETES MELLITUS WITHOUT COMPLICATION, WITH LONG-TERM CURRENT USE OF INSULIN (HCC): Chronic | ICD-10-CM

## 2024-09-20 DIAGNOSIS — G47.09 OTHER INSOMNIA: ICD-10-CM

## 2024-09-20 DIAGNOSIS — G30.1 LATE ONSET ALZHEIMER'S DISEASE WITH BEHAVIORAL DISTURBANCE (HCC): Chronic | ICD-10-CM

## 2024-09-20 DIAGNOSIS — F33.9 RECURRENT MAJOR DEPRESSIVE DISORDER, REMISSION STATUS UNSPECIFIED (HCC): ICD-10-CM

## 2024-09-20 DIAGNOSIS — E05.90 HYPERTHYROIDISM: Chronic | ICD-10-CM

## 2024-09-20 DIAGNOSIS — I48.91 ATRIAL FIBRILLATION, UNSPECIFIED TYPE (HCC): Primary | Chronic | ICD-10-CM

## 2024-09-20 DIAGNOSIS — G62.9 NEUROPATHY: ICD-10-CM

## 2024-09-20 DIAGNOSIS — Z86.718 HISTORY OF DVT (DEEP VEIN THROMBOSIS): Chronic | ICD-10-CM

## 2024-09-20 DIAGNOSIS — F02.818 LATE ONSET ALZHEIMER'S DISEASE WITH BEHAVIORAL DISTURBANCE (HCC): Chronic | ICD-10-CM

## 2024-09-20 DIAGNOSIS — N31.9 NEUROGENIC BLADDER: ICD-10-CM

## 2024-09-20 DIAGNOSIS — E55.9 VITAMIN D DEFICIENCY: ICD-10-CM

## 2024-09-20 DIAGNOSIS — Z93.59 CHRONIC SUPRAPUBIC CATHETER (HCC): ICD-10-CM

## 2024-09-20 DIAGNOSIS — F31.30 BIPOLAR AFFECTIVE DISORDER, CURRENT EPISODE DEPRESSED, CURRENT EPISODE SEVERITY UNSPECIFIED (HCC): Chronic | ICD-10-CM

## 2024-09-20 DIAGNOSIS — Z66 DNR (DO NOT RESUSCITATE): ICD-10-CM

## 2024-09-20 DIAGNOSIS — Z79.4 TYPE 2 DIABETES MELLITUS WITHOUT COMPLICATION, WITH LONG-TERM CURRENT USE OF INSULIN (HCC): Chronic | ICD-10-CM

## 2024-09-20 PROCEDURE — 99309 SBSQ NF CARE MODERATE MDM 30: CPT

## 2024-09-20 NOTE — PROGRESS NOTES
[facility]  [address]  [place] [address]  Facility: [facility]  [facility]  Long Term Care: POS 32    NAME: Josefina Castro  AGE: 83 y.o. SEX: female    DATE OF ENCOUNTER: 9/20/24    Code status:  No CPR    Assessment and Plan     1. Atrial fibrillation, unspecified type (HCC)  Assessment & Plan:  Continue to take digoxin  Most recent digoxin level 0.6 on 9/3  Continue Xarelto for anticoagulation  Intermittent lab monitoring for change in condition  2. Chronic pulmonary embolism without acute cor pulmonale, unspecified pulmonary embolism type (HCC)  Assessment & Plan:  Continue lifelong Xarelto  3. Type 2 diabetes mellitus without complication, with long-term current use of insulin (HCC)  Assessment & Plan:  Most recent hemoglobin A1c 6.5% on 7/17/2024  Target hemoglobin A1c less than 8% given frailty and long-term care resident  Lantus decreased to 15 units at bedtime to prevent hypoglycemia  Reviewed blood sugar log and noted fasting blood sugars trending below 100 on many occasions  Continue to monitor blood sugars and adjust insulin as necessary    4. Hyperthyroidism  Assessment & Plan:  Most recent TSH 1.78 on 7/17/2024  Continue current dose of methimazole  Intermittent laboratory monitoring to assess condition  5. Late onset Alzheimer's disease with behavioral disturbance (HCC)  Assessment & Plan:  Continue to reorient, redirect, and reassure patient  Maintain delirium precautions and sleep/wake cycle  Avoid deliriogenic medications  Limit interruptions at night as medically appropriate  Patient continues to require 24/7 assistance  Continue supportive services and assistance with ADLs  Encourage adequate nutrition and hydration  Continue to monitor for acute changes in condition  6. Neuropathy  Assessment & Plan:  Continue gabapentin, stable  7. 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, of the knee, unspecified  laterality  Assessment & Plan:  Continue colchicine  8. Chronic suprapubic catheter (HCC)  Assessment & Plan:  Secondary to neurogenic bladder  Followed by urology service outpatient  24 French suprapubic catheter to be changed every 6 weeks and as needed  Nursing to continue with local care and catheter maintenance  9. Neurogenic bladder  Assessment & Plan:  Suprapubic catheter  Currently draining yellow, cloudy urine with sediment  100 mL flushes of sterile water available for clots or blockages  10. Recurrent major depressive disorder, remission status unspecified ([facility])  Assessment & Plan:  Continue duloxetine and olanzapine  11. Bipolar affective disorder, current episode depressed, current episode severity unspecified ([facility])  Assessment & Plan:  Patient follows with psychiatry service at [facility]  Continue collaborative care with psych  Continue to monitor for acute changes in patient condition  12. DNR (do not resuscitate)  13. Other insomnia  Assessment & Plan:  Continue melatonin at bedtime  Promote good sleep-wake cycle  Limit daytime naps  Nursing staff has no concerns and states that patient is sleeping well  14. Frailty syndrome in geriatric patient  Assessment & Plan:  Secondary to chronic medical conditions  Requires 24/7 care and support at long-term care facility  Continue supportive care and ADL assistance  Management of acute and chronic medical conditions as outlined  15. History of DVT (deep vein thrombosis)  Assessment & Plan:  Continue on lifelong Xarelto secondary to decreased mobility and high likelihood of recurrence  Continue to monitor for acute changes in patient condition  16. Vitamin D deficiency  Assessment & Plan:  Continue supplements  Intermittent laboratory monitoring      All medications and routine orders were reviewed and updated as needed.    Plan discussed with: Nurse and patient    Chief Complaint     Follow-up of chronic conditions    History of Present Illness     Patient  resting comfortably in bed on exam.  Nursing staff has no concerns about patient's condition at this time.  Patient only complains of bilateral hand pain.    The following portions of the patient's history were reviewed and updated as appropriate: current medications, past family history, past medical history, past social history, past surgical history and problem list.    Allergies:  Allergies   Allergen Reactions    Risperdal [Risperidone]        Review of Systems     Review of Systems   Unable to perform ROS: Dementia       Medications and orders     All medications reviewed and updated in care home EMR.      Objective     Vitals: BP 93/52, Temp 97.5 F, HR 79, RR 22, O2 94%    Physical Exam  Vitals and nursing note reviewed.   Constitutional:       General: She is awake. She is not in acute distress.     Appearance: She is not ill-appearing or diaphoretic.   HENT:      Head: Normocephalic and atraumatic.   Cardiovascular:      Rate and Rhythm: Normal rate and regular rhythm.      Heart sounds: Normal heart sounds.   Pulmonary:      Effort: Pulmonary effort is normal. No respiratory distress.      Breath sounds: Normal breath sounds.   Abdominal:      General: Bowel sounds are normal. There is distension.      Tenderness: There is no abdominal tenderness.   Genitourinary:     Comments: Suprapubic catheter draining yellow, cloudy urine with sediment  Musculoskeletal:         General: No deformity or signs of injury.      Right lower leg: No edema.      Left lower leg: No edema.   Skin:     General: Skin is warm and dry.      Findings: No bruising or erythema.   Neurological:      Mental Status: She is alert. Mental status is at baseline.      Motor: Weakness present.      Gait: Gait abnormal.   Psychiatric:         Attention and Perception: Attention and perception normal.         Speech: Speech normal.         Behavior: Behavior normal. Behavior is cooperative.       Pertinent Laboratory/Diagnostic Studies:  "    The following labs were reviewed please see chart or hospital paperwork for details.    9/3/24:  Hbg 11.2  Hct 34.3  Wbc 6.9  Plt 271  Bun 13  Crt 0.78  Na 139  K 4.0  Gfr 74  Glucose 91  Digoxin 0.6  Vit D, 25-OH 35    Portions of the record may have been created with voice recognition software.  Occasional wrong word or \"sound a like\" substitutions may have occurred due to the inherent limitations of voice recognition software.  Read the chart carefully and recognize, using context, where substitutions have occurred.    MARIANA Cloud  Geriatric Medicine  9/20/24      "

## 2024-09-23 RX ORDER — GUAIFENESIN 200 MG/10ML
200 LIQUID ORAL EVERY 4 HOURS PRN
COMMUNITY

## 2024-09-23 RX ORDER — POLYVINYL ALCOHOL 14 MG/ML
2 SOLUTION/ DROPS OPHTHALMIC EVERY 4 HOURS PRN
COMMUNITY

## 2024-09-23 RX ORDER — MENTHOL AND METHYL SALICYLATE 10; 30 G/100G; G/100G
1 CREAM TOPICAL EVERY 12 HOURS PRN
COMMUNITY

## 2024-09-23 NOTE — ASSESSMENT & PLAN NOTE
Most recent hemoglobin A1c 6.5% on 7/17/2024  Target hemoglobin A1c less than 8% given frailty and long-term care resident  Lantus decreased to 15 units at bedtime to prevent hypoglycemia  Reviewed blood sugar log and noted fasting blood sugars trending below 100 on many occasions  Continue to monitor blood sugars and adjust insulin as necessary

## 2024-09-23 NOTE — ASSESSMENT & PLAN NOTE
Most recent TSH 1.78 on 7/17/2024  Continue current dose of methimazole  Intermittent laboratory monitoring to assess condition

## 2024-09-23 NOTE — ASSESSMENT & PLAN NOTE
Patient follows with psychiatry service at [facility]  Continue collaborative care with psych  Continue to monitor for acute changes in patient condition

## 2024-09-23 NOTE — ASSESSMENT & PLAN NOTE
Secondary to neurogenic bladder  Followed by urology service outpatient  24 French suprapubic catheter to be changed every 6 weeks and as needed  Nursing to continue with local care and catheter maintenance

## 2024-09-23 NOTE — ASSESSMENT & PLAN NOTE
Continue melatonin at bedtime  Promote good sleep-wake cycle  Limit daytime naps  Nursing staff has no concerns and states that patient is sleeping well

## 2024-09-23 NOTE — ASSESSMENT & PLAN NOTE
Continue on lifelong Xarelto secondary to decreased mobility and high likelihood of recurrence  Continue to monitor for acute changes in patient condition

## 2024-09-23 NOTE — ASSESSMENT & PLAN NOTE
Suprapubic catheter  Currently draining yellow, cloudy urine with sediment  100 mL flushes of sterile water available for clots or blockages

## 2024-09-23 NOTE — ASSESSMENT & PLAN NOTE
Continue to take digoxin  Most recent digoxin level 0.6 on 9/3  Continue Xarelto for anticoagulation  Intermittent lab monitoring for change in condition

## 2024-11-20 ENCOUNTER — DOCUMENTATION (OUTPATIENT)
Dept: GERIATRICS | Facility: OTHER | Age: 84
End: 2024-11-20

## 2024-11-20 DIAGNOSIS — Z79.4 TYPE 2 DIABETES MELLITUS WITHOUT COMPLICATION, WITH LONG-TERM CURRENT USE OF INSULIN (HCC): Chronic | ICD-10-CM

## 2024-11-20 DIAGNOSIS — Z66 DNR (DO NOT RESUSCITATE): ICD-10-CM

## 2024-11-20 DIAGNOSIS — E55.9 VITAMIN D DEFICIENCY: ICD-10-CM

## 2024-11-20 DIAGNOSIS — F02.818 LATE ONSET ALZHEIMER'S DISEASE WITH BEHAVIORAL DISTURBANCE (HCC): Chronic | ICD-10-CM

## 2024-11-20 DIAGNOSIS — E05.90 HYPERTHYROIDISM: Chronic | ICD-10-CM

## 2024-11-20 DIAGNOSIS — G30.1 LATE ONSET ALZHEIMER'S DISEASE WITH BEHAVIORAL DISTURBANCE (HCC): Chronic | ICD-10-CM

## 2024-11-20 DIAGNOSIS — E11.9 TYPE 2 DIABETES MELLITUS WITHOUT COMPLICATION, WITH LONG-TERM CURRENT USE OF INSULIN (HCC): Chronic | ICD-10-CM

## 2024-11-20 DIAGNOSIS — R54 FRAILTY SYNDROME IN GERIATRIC PATIENT: Chronic | ICD-10-CM

## 2024-11-20 DIAGNOSIS — I48.91 ATRIAL FIBRILLATION, UNSPECIFIED TYPE (HCC): Primary | Chronic | ICD-10-CM

## 2025-01-21 ENCOUNTER — NURSING HOME VISIT (OUTPATIENT)
Dept: GERIATRICS | Facility: OTHER | Age: 85
End: 2025-01-21
Payer: COMMERCIAL

## 2025-01-21 DIAGNOSIS — I27.82 CHRONIC PULMONARY EMBOLISM WITHOUT ACUTE COR PULMONALE, UNSPECIFIED PULMONARY EMBOLISM TYPE (HCC): ICD-10-CM

## 2025-01-21 DIAGNOSIS — E11.9 TYPE 2 DIABETES MELLITUS WITHOUT COMPLICATION, WITH LONG-TERM CURRENT USE OF INSULIN (HCC): Chronic | ICD-10-CM

## 2025-01-21 DIAGNOSIS — Z79.4 TYPE 2 DIABETES MELLITUS WITHOUT COMPLICATION, WITH LONG-TERM CURRENT USE OF INSULIN (HCC): Chronic | ICD-10-CM

## 2025-01-21 DIAGNOSIS — I48.91 ATRIAL FIBRILLATION, UNSPECIFIED TYPE (HCC): Primary | Chronic | ICD-10-CM

## 2025-01-21 DIAGNOSIS — R54 FRAILTY SYNDROME IN GERIATRIC PATIENT: Chronic | ICD-10-CM

## 2025-01-21 DIAGNOSIS — G30.1 LATE ONSET ALZHEIMER'S DISEASE WITH BEHAVIORAL DISTURBANCE (HCC): Chronic | ICD-10-CM

## 2025-01-21 DIAGNOSIS — F02.818 LATE ONSET ALZHEIMER'S DISEASE WITH BEHAVIORAL DISTURBANCE (HCC): Chronic | ICD-10-CM

## 2025-01-21 DIAGNOSIS — Z93.59 CHRONIC SUPRAPUBIC CATHETER (HCC): ICD-10-CM

## 2025-01-21 PROCEDURE — 99309 SBSQ NF CARE MODERATE MDM 30: CPT

## 2025-01-22 VITALS
OXYGEN SATURATION: 98 % | SYSTOLIC BLOOD PRESSURE: 105 MMHG | DIASTOLIC BLOOD PRESSURE: 58 MMHG | TEMPERATURE: 97.2 F | HEART RATE: 64 BPM

## 2025-01-22 NOTE — ASSESSMENT & PLAN NOTE
Secondary to neurogenic bladder  Followed by urology service outpatient  24 French suprapubic catheter to be changed every 6 weeks and as needed  Nursing to continue with local care and catheter maintenance  Hematuria from pulling on catheter, continue prn flushes

## 2025-01-22 NOTE — ASSESSMENT & PLAN NOTE
With behaviors- pulls on SPT   Continue zyprexa, cymbalta  Redirection, reorientation and distraction as appropriate   Fall/safety precautions  Supportive care, assistance with ADLs   Avoid deliriogenic medications   Encourage adequate hydration and nutrition   Maintain sleep/wake cycle

## 2025-01-22 NOTE — PROGRESS NOTES
[facility]  [address], [place], [place]  [phone]  Facility: [facility]  POS 32    NAME: Josefina Castro  AGE: 84 y.o. SEX: female CODE STATUS: No CPR  : 1940     DATE: 25     Assessment and Plan:     Problem List Items Addressed This Visit       Diabetes mellitus (HCC) (Chronic)    A1c 6.2024  Continue lantus 10 units nightly   Continue SSI  Monitor Accucheks            Chronic pulmonary embolism without acute cor pulmonale (HCC)    Continue lifelong Xarelto         Atrial fibrillation (HCC) - Primary (Chronic)    HR stable   Continue digoxin  Continue xarelto          Late onset Alzheimer's disease with behavioral disturbance (HCC) (Chronic)    With behaviors- pulls on SPT   Continue zyprexa, cymbalta  Redirection, reorientation and distraction as appropriate   Fall/safety precautions  Supportive care, assistance with ADLs   Avoid deliriogenic medications   Encourage adequate hydration and nutrition   Maintain sleep/wake cycle            Chronic suprapubic catheter (HCC)    Secondary to neurogenic bladder  Followed by urology service outpatient  24 French suprapubic catheter to be changed every 6 weeks and as needed  Nursing to continue with local care and catheter maintenance  Hematuria from pulling on catheter, continue prn flushes          Frailty syndrome in geriatric patient (Chronic)    Secondary to chronic medical conditions  Continues to require 24/7 care and support at long-term care facility  Continue supportive care and ADL assistance  Management of acute and chronic medical conditions as outlined                 Chief Complaint:     Follow up visit     History of Present Illness:     Patient being seen for long term care follow up visit. Patient resting in bed comfortably. Blood noted in Suprapubic catheter tubing. Per nursing patient often pulls on suprapubic tube causing hematuria. Patient has flush orders. Hx of dementia unable to provide history.  Nursing without acute concerns.         The following portions of the patient's history were reviewed and updated as appropriate: allergies, current medications, past family history, past medical history, past social history, past surgical history and problem list.     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   Constitutional:       General: She is not in acute distress.     Appearance: She is well-developed.   HENT:      Head: Normocephalic and atraumatic.   Eyes:      Conjunctiva/sclera: Conjunctivae normal.   Cardiovascular:      Rate and Rhythm: Normal rate and regular rhythm.      Heart sounds: No murmur heard.  Pulmonary:      Effort: Pulmonary effort is normal. No respiratory distress.      Breath sounds: Normal breath sounds.   Abdominal:      Palpations: Abdomen is soft.      Tenderness: There is no abdominal tenderness.   Genitourinary:     Comments: SPT, hematuria  Musculoskeletal:         General: No swelling.      Cervical back: Neck  supple.      Right lower leg: No edema.      Left lower leg: No edema.   Skin:     General: Skin is warm and dry.      Capillary Refill: Capillary refill takes less than 2 seconds.   Neurological:      Mental Status: She is alert.   Psychiatric:         Cognition and Memory: Cognition is impaired.         Pertinent Laboratory/Diagnostic Studies:    Laboratory Results: I have personally reviewed the pertinent laboratory results/reports     Radiology/Other Diagnostic Testing Results: I have personally reviewed the pertinent diagnostic reports/results.     MARIANA Majano  Geriatric Medicine

## 2025-03-18 ENCOUNTER — NURSING HOME VISIT (OUTPATIENT)
Dept: GERIATRICS | Facility: OTHER | Age: 85
End: 2025-03-18
Payer: COMMERCIAL

## 2025-03-18 VITALS
OXYGEN SATURATION: 96 % | DIASTOLIC BLOOD PRESSURE: 56 MMHG | HEART RATE: 72 BPM | SYSTOLIC BLOOD PRESSURE: 106 MMHG | BODY MASS INDEX: 22.92 KG/M2 | TEMPERATURE: 98.8 F | WEIGHT: 155.2 LBS

## 2025-03-18 DIAGNOSIS — E05.90 HYPERTHYROIDISM: Chronic | ICD-10-CM

## 2025-03-18 DIAGNOSIS — Z79.4 TYPE 2 DIABETES MELLITUS WITHOUT COMPLICATION, WITH LONG-TERM CURRENT USE OF INSULIN (HCC): Chronic | ICD-10-CM

## 2025-03-18 DIAGNOSIS — F02.818 LATE ONSET ALZHEIMER'S DISEASE WITH BEHAVIORAL DISTURBANCE (HCC): Chronic | ICD-10-CM

## 2025-03-18 DIAGNOSIS — G30.1 LATE ONSET ALZHEIMER'S DISEASE WITH BEHAVIORAL DISTURBANCE (HCC): Chronic | ICD-10-CM

## 2025-03-18 DIAGNOSIS — Z66 DNR (DO NOT RESUSCITATE): ICD-10-CM

## 2025-03-18 DIAGNOSIS — E11.9 TYPE 2 DIABETES MELLITUS WITHOUT COMPLICATION, WITH LONG-TERM CURRENT USE OF INSULIN (HCC): Chronic | ICD-10-CM

## 2025-03-18 DIAGNOSIS — F31.30 BIPOLAR AFFECTIVE DISORDER, CURRENT EPISODE DEPRESSED, CURRENT EPISODE SEVERITY UNSPECIFIED (HCC): Chronic | ICD-10-CM

## 2025-03-18 DIAGNOSIS — I27.82 CHRONIC PULMONARY EMBOLISM WITHOUT ACUTE COR PULMONALE, UNSPECIFIED PULMONARY EMBOLISM TYPE (HCC): Primary | ICD-10-CM

## 2025-03-18 DIAGNOSIS — M11.269 CHONDROCALCINOSIS DUE TO DICALCIUM PHOSPHATE CRYSTALS, OF THE KNEE, UNSPECIFIED LATERALITY: ICD-10-CM

## 2025-03-18 DIAGNOSIS — I48.91 ATRIAL FIBRILLATION, UNSPECIFIED TYPE (HCC): Chronic | ICD-10-CM

## 2025-03-18 DIAGNOSIS — N31.9 NEUROGENIC BLADDER: ICD-10-CM

## 2025-03-18 PROCEDURE — 99309 SBSQ NF CARE MODERATE MDM 30: CPT | Performed by: INTERNAL MEDICINE

## 2025-03-18 NOTE — ASSESSMENT & PLAN NOTE
Lab Results   Component Value Date    HGBA1C 5.5 01/12/2024   Patient with a hemoglobin A1c of 6.5 previously will need to continue decreasing glargine insulin in order to avoid hypoglycemia.

## 2025-03-18 NOTE — PROGRESS NOTES
[facility]  Masood Wood MD FACP-AGSF  Progress Note dated [facility] long-term care POS 32  Time spent in dictation 60 minutes 20 minutes reviewing chart 40 minutes evaluating patient and speaking with staff in regards to her care.  In particular I was able to speak with speech therapy.  In particular I was able to speak with speech therapy who was present at the time of evaluation.    NAME: Josefina Castro  AGE: 84 y.o. SEX: female [phone]    DATE OF ENCOUNTER: 3/18/2025    Assessment and Plan     Problem List Items Addressed This Visit          Cardiovascular and Mediastinum    Atrial fibrillation (HCC) (Chronic)    Patient with history of atrial fibrillation will maintain on oral anticoagulation at present.         Chronic pulmonary embolism without acute cor pulmonale (HCC) - Primary    Patient currently on oral anticoagulation with Xarelto 20 mg orally daily.            Endocrine    Diabetes mellitus (HCC) (Chronic)      Lab Results   Component Value Date    HGBA1C 5.5 01/12/2024   Patient with a hemoglobin A1c of 6.5 previously will need to continue decreasing glargine insulin in order to avoid hypoglycemia.         Hyperthyroidism (Chronic)    Patient currently on methimazole last TSH was 1.78.            Nervous and Auditory    Late onset Alzheimer's disease with behavioral disturbance (HCC) (Chronic)    Patient with history of Alzheimer's disease.            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            Urinary    Neurogenic bladder    Patient with history of suprapubic catheter secondary to her neurogenic bladder            Behavioral Health    Bipolar disorder (HCC) (Chronic)    Patient with previous history of bipolar disorder.            Care Coordination    DNR (do not resuscitate)    Patient  currently is DNR        Recommendations    1.-Hemoglobin A1c in a.m.    2.  CMP in a.m.    All medications and routine orders were reviewed and updated as needed.    Plan discussed with: Nurse    Chief Complaint     No chief complaint on file.  Patient was being evaluated by speech therapy at the time of my evaluation.    History of Present Illness     HPI patient is an 84-year-old  female who resides at the facility.  Patient is bedbound has multiple comorbidities which include atrial fibrillation, type 2 diabetes mellitus, hypothyroidism, late onset Alzheimer's disease, neuropathy, chondrocalcinosis of the knee, neurogenic bladder with evidence of a suprapubic catheter, recurrent major depressive disorder, bipolar affective disorder, patient is DNR has had increased frailty and had problems swallowing although the speech therapist tells me that she is on clear liquids and is able to swallow without difficulty.  Patient has previous history of DVTs has been on lifelong Xarelto both for atrial fibs and the DVTs and also has history of vitamin D deficiency.  Patient was noted to have a hemoglobin A1c of 6.5 in a patient who is being fed and depends on staff for her feedings.  Given her low hemoglobin A1c when she tried to get those numbers between 7.5 and 8.5 in order to minimize risk of hypoglycemia.  I have ordered additional testing for tomorrow and we will make the appropriate adjustments at that time.      HISTORY:  Past Surgical History:   Procedure Laterality Date    EYE SURGERY      HYSTERECTOMY        Past Medical History:   Diagnosis Date    Acute thyroiditis     Arthritis     Ataxia     Atrial fibrillation (HCC)     Bipolar disorder (HCC)     Bipolar disorder, unspecified (HCC)     Coronary artery disease     Cough 06/15/2023    COVID 09/28/2023    Diabetes mellitus (HCC)     Diarrhea 04/13/2023    Difficulty in walking     DVT (deep venous thrombosis) ([facility])     Fecal impaction (HCC) 09/14/2021     Headache     Hyperlipidemia     Ileus (HCC) 09/03/2019    Macular degeneration     Major depressive disorder     Muscle weakness     Pruritus 11/12/2020    Psychiatric disorder     depression    Pulmonary embolism ([facility])     Sore throat 01/02/2022    Stye 03/21/2022    Unresponsive episode 05/04/2022    Weakness      Family History   Problem Relation Age of Onset    Coronary artery disease Father     Diabetes Sister     Diabetes Brother      Social History     Socioeconomic History    Marital status: Single     Spouse name: None    Number of children: None    Years of education: None    Highest education level: None   Occupational History    None   Tobacco Use    Smoking status: Former    Smokeless tobacco: Former     Quit date: 12/26/2013   Vaping Use    Vaping status: Never Used   Substance and Sexual Activity    Alcohol use: No    Drug use: No    Sexual activity: Not Currently   Other Topics Concern    None   Social History Narrative    None     Social Drivers of Health     Financial Resource Strain: Not on file   Food Insecurity: Not on file   Transportation Needs: Not on file   Physical Activity: Not on file   Stress: Not on file   Social Connections: Not on file   Intimate Partner Violence: Not on file   Housing Stability: Not on file       Allergies:  Allergies   Allergen Reactions    Risperdal [Risperidone]        Review of Systems     Review of Systems   Constitutional:  Positive for unexpected weight change.   HENT: Negative.     Eyes: Negative.    Respiratory: Negative.     Cardiovascular: Negative.    Gastrointestinal: Negative.    Endocrine:        Diabetes mellitus type 2   Musculoskeletal:  Positive for arthralgias and gait problem.   Psychiatric/Behavioral:  Positive for confusion and decreased concentration. The patient is nervous/anxious.        PHQ-2/9 Depression Screening             Medications and orders       Current Outpatient Medications:     acetaminophen (TYLENOL) 325 mg tablet, Take 650 mg  by mouth every 6 (six) hours as needed for mild pain, Disp: , Rfl:     acetaminophen (TYLENOL) 650 mg suppository, Insert 1 suppository into the rectum every 6 (six) hours as needed , Disp: , Rfl:     bisacodyl (DULCOLAX) 10 mg suppository, Insert 10 mg into the rectum daily, Disp: , Rfl:     Cholecalciferol 50 MCG (2000 UT) TABS, Take 1 tablet by mouth in the morning Do not start before September 2, 2024., Disp: , Rfl:     colchicine (COLCRYS) 0.6 mg tablet, Take 1 tablet by mouth daily, Disp: , Rfl:     digoxin (LANOXIN) 0.125 mg tablet, Take 1 tablet by mouth daily, Disp: , Rfl:     DULoxetine (CYMBALTA) 20 mg capsule, Take 30 mg by mouth daily, Disp: , Rfl:     gabapentin (NEURONTIN) 100 mg capsule, Take 2 capsules by mouth daily  At 9:00 a.m. And 300 mg at bedtime, Disp: , Rfl:     gabapentin (NEURONTIN) 300 mg capsule, Take 300 mg by mouth daily at bedtime, Disp: , Rfl:     guaiFENesin (ROBITUSSIN) 100 MG/5ML oral liquid, Take 200 mg by mouth every 4 (four) hours as needed for cough, Disp: , Rfl:     KETOCONAZOLE, TOPICAL, 1 % SHAM, Apply 1 Application topically once a week Sunday, Disp: , Rfl:     Lantus SoloStar 100 units/mL SOPN, Inject 10 Units under the skin daily at bedtime, Disp: , Rfl:     magnesium hydroxide (Milk of Magnesia) 400 mg/5 mL oral suspension, Take 30 mL by mouth daily as needed for constipation, Disp: , Rfl:     melatonin 3 mg, Take 2 tablets by mouth daily at bedtime, Disp: , Rfl:     Menthol-Methyl Salicylate (Icy Hot Extra Strength) 10-30 % CREA, Apply 1 Application topically every 12 (twelve) hours as needed, Disp: , Rfl:     methimazole (TAPAZOLE) 5 mg tablet, Take 0.5 tablets by mouth every other day, Disp: , Rfl:     NovoLOG FlexPen 100 units/mL injection pen, Inject 5 Units under the skin 3 (three) times a day with meals, Disp: , Rfl:     OLANZapine (ZyPREXA) 2.5 mg tablet, Take 2.5 mg by mouth 2 (two) times a day, Disp: , Rfl:     polyethylene glycol (MIRALAX) 17 g packet, Take  17 g by mouth daily, Disp: 30 each, Rfl: 6    polyvinyl alcohol (LIQUIFILM TEARS) 1.4 % ophthalmic solution, Administer 2 drops to both eyes every 4 (four) hours as needed for dry eyes, Disp: , Rfl:     potassium chloride (K-DUR,KLOR-CON) 20 mEq tablet, Take 40 mEq by mouth daily, Disp: , Rfl:     rivaroxaban (XARELTO) 20 mg tablet, Take 1 tablet by mouth daily, Disp: , Rfl:     senna (SENOKOT) 8.6 MG tablet, Take 2 tablets by mouth daily, Disp: , Rfl:        Objective     Vitals:   Vitals:    03/18/25 1354   BP: 106/56   Pulse: 72   Temp: 98.8 °F (37.1 °C)   SpO2: 96%   Weight: 70.4 kg (155 lb 3.2 oz)       Physical Exam  HENT:      Head: Atraumatic.      Right Ear: External ear normal.      Left Ear: External ear normal.      Nose: Nose normal.      Mouth/Throat:      Mouth: Mucous membranes are dry.   Eyes:      Pupils: Pupils are equal, round, and reactive to light.   Cardiovascular:      Rate and Rhythm: Normal rate. Rhythm irregular.   Pulmonary:      Effort: Pulmonary effort is normal.      Breath sounds: Normal breath sounds.   Abdominal:      General: Bowel sounds are normal.      Palpations: Abdomen is soft.   Musculoskeletal:         General: Normal range of motion.      Cervical back: Neck supple.   Skin:     General: Skin is dry.   Neurological:      Mental Status: She is alert. Mental status is at baseline. She is disoriented.   Psychiatric:         Mood and Affect: Mood normal.         Behavior: Behavior normal.         Pertinent Laboratory/Diagnostic Studies:   The following labs/studies were reviewed please see facility chart for details.

## 2025-03-27 NOTE — PROGRESS NOTES
[facility]  [address]  [place] [address]  Facility: [facility]  [facility]  32  Follow-up visit    NAME: Josefina Castro  AGE: 83 y.o. SEX: female    DATE OF ENCOUNTER: March 28, 2024.    Code status:   DNR/DNH    Assessment and Plan     1. Atrial fibrillation, unspecified type (HCC)  Assessment & Plan:  She is doing well with digoxin for rate control  She continues on rivaroxaban for anticoagulation  Will continue with this care plan  Will continue with clinical and periodic laboratory monitoring for change in her condition  2. Type 2 diabetes mellitus without complication, with long-term current use of insulin (HCC)  Assessment & Plan:  January 12, 2024: Hemoglobin A1c: 5.5%,   Will continue with adjustments of her basal and preprandial insulins to avoid hypoglycemia and hyperglycemia  Will continue to target a hemoglobin A1c of less than 8% given her frailty and long-term care residence  Will continue with clinical and periodic laboratory monitoring for change in her condition  3. Hyperthyroidism  Assessment & Plan:  She is doing well with her current dosage of methimazole as evidenced by her recent TSH result of 1.03 being in the normal range  Will continue with her current methimazole dosage  Will continue with clinical and periodic laboratory monitoring for change in her condition  4. Frailty syndrome in geriatric patient  Assessment & Plan:  Secondary to her chronic medical conditions  She continues to require 24/7 care/support of her ADLs  I recommend continued care/support of her ADLs as a long-term resident at the nursing facility  Will continue to monitor for change in her condition  5. DNR (do not resuscitate)    All medications and routine orders were reviewed and updated as needed.    Plan discussed with: Nursing staff.    Chief Complaint     She is seen for a follow-up visit to update the care and treatment of her  "chronic medical conditions.    History of Present Illness     She is an 83-year-old woman who is seen for a follow-up visit to update the care and treatment of her chronic medical conditions, including atrial fibrillation, insulin requiring type 2 diabetes mellitus, hypothyroidism, and frailty secondary to her chronic medical conditions.    Nursing staff reports that her overall clinical/functional status is stable.    The patient reports trouble sleeping.  She denies chest pain and shortness of breath.  She reports that her appetite is good.  She reports occasional stomach pain.    The following portions of the patient's history were reviewed and updated as appropriate: current medications, past family history, past medical history, past social history, past surgical history and problem list.    Allergies:  Allergies   Allergen Reactions    Risperdal [Risperidone]        Review of Systems     See HPI.    Medications and orders     All medications reviewed and updated in custodial EMR.      Objective     Vitals: Recent vitals: Weight 158 pounds, pulse 76, blood pressure 113/59.    Physical Exam  Cardiovascular:      Rate and Rhythm: Normal rate and regular rhythm.      Heart sounds: Normal heart sounds. No murmur heard.     No friction rub. No gallop.   Pulmonary:      Effort: No respiratory distress.      Breath sounds: Normal breath sounds. No stridor. No wheezing, rhonchi or rales.   Abdominal:      General: There is distension.      Palpations: Abdomen is soft.      Tenderness: There is no abdominal tenderness.   Musculoskeletal:      Right lower leg: No edema.       January 12, 2024:    TSH: 1.03    Fasting lipid profile: Total cholesterol 118, triglycerides 150, HDL 32, LDL 54    Hemoglobin A1c: 5.5%,     - Her order summary was reviewed and signed.      Portions of the record may have been created with voice recognition software.  Occasional wrong word or \"sound a like\" substitutions may have " occurred due to the inherent limitations of voice recognition software.  Read the chart carefully and recognize, using context, where substitutions have occurred.    Luiz Decker M.D.

## 2025-03-27 NOTE — ASSESSMENT & PLAN NOTE
Will continue to provide a safe, secure, structured, and supportive environment at the nursing facility  Will continue with 24/7 care/support of her ADLs at the nursing facility  Nursing staff reports that she is stable with her current psychotropic medication regimen  Will continue her care in collaboration with the facility psychiatry service who last saw her on October 25, 2024  Will continue with monitoring for change in her condition

## 2025-03-27 NOTE — ASSESSMENT & PLAN NOTE
She is doing well with her current dosage of methimazole  Will continue with same  Will continue with clinical and periodic laboratory monitoring for change in her condition

## 2025-03-27 NOTE — ASSESSMENT & PLAN NOTE
July 5, 2024: Vitamin D 25-hydroxy level: 16  September 3, 2024: Vitamin D 25-hydroxy level: 35  She is doing well with her current oral vitamin D3 supplement  Will continue with periodic laboratory monitoring for change in her condition

## 2025-03-27 NOTE — PROGRESS NOTES
[facility]  [address]  [place] [address]  Facility: [facility]  [facility]  32  Follow-up visit    NAME: Josefina Castro  AGE: 83 y.o. SEX: female    DATE OF ENCOUNTER: November 20, 2024.    Code status:   DNR/DNH    Assessment and Plan     1. Atrial fibrillation, unspecified type (HCC)  Assessment & Plan:  She is doing well with digoxin for rate control and rivaroxaban for anticoagulation  Will continue with this care plan  Will continue with clinical and periodic laboratory monitoring for change in her condition  2. Type 2 diabetes mellitus without complication, with long-term current use of insulin (HCC)  Assessment & Plan:  After reviewing her fingerstick blood sugar log, I will reduce the dosage of her insulin glargine from 15 units daily at bedtime to 10 units daily at bedtime, will discontinue routine Humalog, and start a Humalog insulin sliding scale  Will continue to adjust her insulin dosing to avoid hypo and hyperglycemia  Will continue to target hemoglobin A1c less than 8% given her frailty and long-term care residence  Will continue with clinical and periodic laboratory monitoring for change in her condition  3. Vitamin D deficiency  Assessment & Plan:  July 5, 2024: Vitamin D 25-hydroxy level: 16  September 3, 2024: Vitamin D 25-hydroxy level: 35  She is doing well with her current oral vitamin D3 supplement  Will continue with periodic laboratory monitoring for change in her condition  4. Hyperthyroidism  Assessment & Plan:  She is doing well with her current dosage of methimazole  Will continue with same  Will continue with clinical and periodic laboratory monitoring for change in her condition  5. Frailty syndrome in geriatric patient  Assessment & Plan:  Secondary to her chronic medical conditions  She continues to require 24/7 care/support of her ADLs  I recommend continued care/support of her ADLs as a long-term resident  at the nursing facility  Will continue to monitor for change in her condition  6. Late onset Alzheimer's disease with behavioral disturbance (HCC)  Assessment & Plan:  Will continue to provide a safe, secure, structured, and supportive environment at the nursing facility  Will continue with 24/7 care/support of her ADLs at the nursing facility  Nursing staff reports that she is stable with her current psychotropic medication regimen  Will continue her care in collaboration with the facility psychiatry service who last saw her on October 25, 2024  Will continue with monitoring for change in her condition  7. DNR (do not resuscitate)    All medications and routine orders were reviewed and updated as needed.    Plan discussed with: Nursing staff.    Chief Complaint     She is seen for a follow-up visit to update the care and treatment of her chronic medical conditions.    History of Present Illness     She is an 83-year-old woman who is seen with female nursing staff for a follow-up visit to update the care and treatment of her chronic medical conditions, including atrial fibrillation, insulin requiring type 2 diabetes mellitus, vitamin D deficiency, hypothyroidism, and frailty secondary to her chronic medical conditions.    When asked, she has no complaints.    Nursing staff reports that her overall clinical/functional status is stable, that she is sleeping well, and that she is not exhibiting any new behaviors.    Review of her amount eaten log shows generally 51 to 75% meal completion.  Review of her bowel movement log shows a daily bowel movement.    The following portions of the patient's history were reviewed and updated as appropriate: current medications, past family history, past medical history, past social history, past surgical history and problem list.    Allergies:  Allergies   Allergen Reactions    Risperdal [Risperidone]        Review of Systems     Review of Systems   Unable to perform ROS: Dementia  "      Medications and orders     All medications reviewed and updated in half-way EMR.      Objective     Vitals: Recent vitals: Weight 144 pounds, pulse 72, blood pressure 105/59.    Physical Exam  Vitals reviewed. Exam conducted with a chaperone present.   Cardiovascular:      Rate and Rhythm: Normal rate and regular rhythm.      Heart sounds: Normal heart sounds. No murmur heard.     No friction rub. No gallop.   Pulmonary:      Effort: No respiratory distress.      Breath sounds: Normal breath sounds. No stridor. No wheezing, rhonchi or rales.   Musculoskeletal:      Right lower leg: No edema.      Left lower leg: No edema.       Pertinent Laboratory/Diagnostic Studies:     The following labs were reviewed please see chart or hospital paperwork for details.    September 3, 2024:    CBC with differential: WBC count 6.9, hemoglobin 11.2, hematocrit 34.3, platelet count 271,000, MCV 80    CMP: Sodium 139, potassium 4, BUN 13, creatinine 0.78, fasting blood sugar 91, EGFR 74, LFTs WNL except albumin 3.1    Digoxin level: 0.6    Vitamin D 25-hydroxy level: 35    - Her order summary was reviewed and signed.      Portions of the record may have been created with voice recognition software.  Occasional wrong word or \"sound a like\" substitutions may have occurred due to the inherent limitations of voice recognition software.  Read the chart carefully and recognize, using context, where substitutions have occurred.    Luiz Decker M.D.        "

## 2025-03-27 NOTE — ASSESSMENT & PLAN NOTE
She is doing well with digoxin for rate control  She continues on rivaroxaban for anticoagulation  Will continue with this care plan  Will continue with clinical and periodic laboratory monitoring for change in her condition

## 2025-03-27 NOTE — ASSESSMENT & PLAN NOTE
She is doing well with digoxin for rate control and rivaroxaban for anticoagulation  Will continue with this care plan  Will continue with clinical and periodic laboratory monitoring for change in her condition

## 2025-03-27 NOTE — ASSESSMENT & PLAN NOTE
After reviewing her fingerstick blood sugar log, I will reduce the dosage of her insulin glargine from 15 units daily at bedtime to 10 units daily at bedtime, will discontinue routine Humalog, and start a Humalog insulin sliding scale  Will continue to adjust her insulin dosing to avoid hypo and hyperglycemia  Will continue to target hemoglobin A1c less than 8% given her frailty and long-term care residence  Will continue with clinical and periodic laboratory monitoring for change in her condition

## 2025-03-27 NOTE — ASSESSMENT & PLAN NOTE
January 12, 2024: Hemoglobin A1c: 5.5%,   Will continue with adjustments of her basal and preprandial insulins to avoid hypoglycemia and hyperglycemia  Will continue to target a hemoglobin A1c of less than 8% given her frailty and long-term care residence  Will continue with clinical and periodic laboratory monitoring for change in her condition

## 2025-03-27 NOTE — ASSESSMENT & PLAN NOTE
She is doing well with her current dosage of methimazole as evidenced by her recent TSH result of 1.03 being in the normal range  Will continue with her current methimazole dosage  Will continue with clinical and periodic laboratory monitoring for change in her condition

## 2025-03-28 ENCOUNTER — NURSING HOME VISIT (OUTPATIENT)
Dept: GERIATRICS | Facility: OTHER | Age: 85
End: 2025-03-28
Payer: COMMERCIAL

## 2025-03-28 DIAGNOSIS — E86.1 HYPOTENSION DUE TO HYPOVOLEMIA: Primary | ICD-10-CM

## 2025-03-28 PROCEDURE — 99308 SBSQ NF CARE LOW MDM 20: CPT

## 2025-03-28 NOTE — PROGRESS NOTES
[facility]  [address]  [place] [address]  Facility: [facility]  [facility]  Long Term Care: POS 32    NAME: Josefina Castro  AGE: 84 y.o. SEX: female    DATE OF ENCOUNTER: 3/28/25    Code status:  No CPR, DNH    Assessment and Plan     1. Hypotension due to hypovolemia  Assessment & Plan:  This morning BP was 84/42, patient with no complaints  This afternoon, blood pressure improved to 88/52  Documentation shows 0 to 25% of meal completion for dinner last night and breakfast this morning  Patient has to be fed each meal and assisted with fluids  Mucous membranes and tongue are very dry on exam  Order to check vital signs every shift and encourage 500 mL of fluids 3 times daily for 3 days  If no improvement, may need fluids via clysis or IV and labs for infectious workup  At this time, patient appears well laying in bed with no signs of distress or infection  Continue to monitor for acute changes in patient condition      All medications and routine orders were reviewed and updated as needed.    Plan discussed with: Nurse    Chief Complaint     Acute visit    History of Present Illness     Patient noted to be hypotensive this morning.  After reassessing blood pressure this afternoon, it is slightly improved although remains low.     The following portions of the patient's history were reviewed and updated as appropriate: current medications, past family history, past medical history, past social history, past surgical history and problem list.    Allergies:  Allergies   Allergen Reactions   • Risperdal [Risperidone]        Review of Systems     Review of Systems   Unable to perform ROS: Dementia       Medications and orders     All medications reviewed and updated in long-term EMR.      Objective     Vitals: BP 88/52, temp 98.8 °F, HR 70, RR 20, O2 96%, weight 132.5 pounds    Physical Exam  Vitals and nursing note reviewed.  "  Constitutional:       General: She is awake. She is not in acute distress.     Appearance: She is well-groomed. She is not ill-appearing or diaphoretic.   HENT:      Head: Normocephalic and atraumatic.      Mouth/Throat:      Mouth: Mucous membranes are dry.   Cardiovascular:      Rate and Rhythm: Normal rate and regular rhythm.      Heart sounds: Normal heart sounds, S1 normal and S2 normal.   Pulmonary:      Effort: Pulmonary effort is normal. No respiratory distress.      Breath sounds: Normal breath sounds.   Abdominal:      General: Bowel sounds are decreased. There is distension.      Tenderness: There is no abdominal tenderness.      Comments: Abdomen distended at baseline   Genitourinary:     Comments: Suprapubic catheter draining yellow, cloudy urine with sediment  Musculoskeletal:         General: No deformity or signs of injury.      Right lower leg: No edema.      Left lower leg: No edema.   Skin:     General: Skin is warm and dry.      Findings: No bruising or erythema.   Neurological:      Mental Status: She is alert. Mental status is at baseline.      Motor: Weakness present.      Gait: Gait abnormal.   Psychiatric:         Attention and Perception: Attention normal.       Portions of the record may have been created with voice recognition software.  Occasional wrong word or \"sound a like\" substitutions may have occurred due to the inherent limitations of voice recognition software.  Read the chart carefully and recognize, using context, where substitutions have occurred.    MARIANA Cloud  Geriatric Medicine  3/28/2025 3:37 PM  "

## 2025-03-28 NOTE — ASSESSMENT & PLAN NOTE
This morning BP was 84/42, patient with no complaints  This afternoon, blood pressure improved to 88/52  Documentation shows 0 to 25% of meal completion for dinner last night and breakfast this morning  Patient has to be fed each meal and assisted with fluids  Mucous membranes and tongue are very dry on exam  Order to check vital signs every shift and encourage 500 mL of fluids 3 times daily for 3 days  If no improvement, may need fluids via clysis or IV and labs for infectious workup  At this time, patient appears well laying in bed with no signs of distress or infection  Continue to monitor for acute changes in patient condition

## 2025-04-09 ENCOUNTER — NURSING HOME VISIT (OUTPATIENT)
Dept: GERIATRICS | Facility: OTHER | Age: 85
End: 2025-04-09
Payer: COMMERCIAL

## 2025-04-09 DIAGNOSIS — Z66 DNR (DO NOT RESUSCITATE): ICD-10-CM

## 2025-04-09 DIAGNOSIS — R54 FRAILTY SYNDROME IN GERIATRIC PATIENT: Chronic | ICD-10-CM

## 2025-04-09 DIAGNOSIS — R40.1 STUPOROUS: Primary | ICD-10-CM

## 2025-04-09 PROCEDURE — 99310 SBSQ NF CARE HIGH MDM 45: CPT | Performed by: INTERNAL MEDICINE

## 2025-04-12 PROBLEM — R40.1: Status: ACTIVE | Noted: 2025-04-09

## 2025-04-12 NOTE — ASSESSMENT & PLAN NOTE
Will continue with 24/7 care/support of her ADLs at the nursing facility  Will continue to monitor for change in her condition

## 2025-04-12 NOTE — PROGRESS NOTES
[facility]  [address]  [place] [address]  Facility: [facility]  [facility]  32  Acute visit    NAME: Josefina Castro  AGE: 84 y.o. SEX: female    DATE OF ENCOUNTER: April 9, 2025.    Code status:   DNR/DNH    Assessment and Plan     1. Stuporous  Assessment & Plan:  I spoke at length with the patient's daughter, Vale, during my visit  We discussed patient's current condition and her quality of life, in general  Patient's daughter confirms that her mother is to be DNR/DNH  I discussed refluxing to comfort care versus further evaluation  At this time, her daughter is not comfortable refluxing to comfort care  In shared decision making, we will order some laboratory test, including UA with culture  Will support with parenteral fluids via hypodermoclysis  Patient's daughter is aware that her mother's condition is guarded  Will continue with close monitoring  Further recommendations pending results of testing  2. 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 DNR (do not resuscitate)      All medications and routine orders were reviewed and updated as needed.    Plan discussed with: Nursing staff and daughter, Vale.    I have spent a total time of 60 minutes in caring for this patient on the day of the visit/encounter including Prognosis, Risks and benefits of tx options, Instructions for management, Patient and family education, Impressions, Counseling / Coordination of care, Reviewing/placing orders in the medical record (including tests, medications, and/or procedures), Obtaining or reviewing history  , and Communicating with other healthcare professionals .      Chief Complaint     Nursing staff reports a change in her condition.    History of Present Illness     She is an 84-year-old woman with the  comorbidities of atrial fibrillation, insulin requiring type 2 diabetes mellitus, vitamin D deficiency, hyperthyroidism, bipolar disorder, and late onset Alzheimer's disease with behavioral disturbance who is seen for an acute visit as the nursing staff reports a change in her condition.  Nursing reports that she is less responsive and staring at the ceiling.  She is not responding to requests.    The following portions of the patient's history were reviewed and updated as appropriate: current medications, past family history, past medical history, past social history, past surgical history and problem list.    Allergies:  Allergies   Allergen Reactions    Risperdal [Risperidone]        Review of Systems     Review of Systems   Unable to perform ROS: Acuity of condition       Medications and orders     All medications reviewed and updated in long-term EMR.      Objective     Vitals: Temperature 97.7 °F, pulse 97, blood pressure 122/65, random blood sugar 232.    Physical Exam  Vitals reviewed. Exam conducted with a chaperone present.   Constitutional:       General: She is awake. She is not in acute distress.     Appearance: She is well-developed. She is not toxic-appearing or diaphoretic.      Comments: She is stuporous as evidenced by little response to verbal interaction and requests.  She denies having pain.   Cardiovascular:      Rate and Rhythm: Normal rate and regular rhythm.      Heart sounds: Normal heart sounds. No murmur heard.     No friction rub. No gallop.   Pulmonary:      Effort: No respiratory distress.      Breath sounds: Normal breath sounds. No stridor. No wheezing, rhonchi or rales.   Abdominal:      General: There is distension.      Palpations: Abdomen is soft. There is no mass.      Tenderness: There is abdominal tenderness (Mild suprapubic). There is no guarding or rebound.   Genitourinary:     Comments: Urine in her catheter bag appears cloudy.  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      Portions of the record may have been created with voice recognition software.  Occasional wrong word or \"sound a like\" substitutions may have occurred due to the inherent limitations of voice recognition software.  Read the chart carefully and recognize, using context, where substitutions have occurred.    Luiz Decker M.D.        "

## 2025-04-12 NOTE — ASSESSMENT & PLAN NOTE
I spoke at length with the patient's daughter, Vale, during my visit  We discussed patient's current condition and her quality of life, in general  Patient's daughter confirms that her mother is to be DNR/DNH  I discussed refluxing to comfort care versus further evaluation  At this time, her daughter is not comfortable refluxing to comfort care  In shared decision making, we will order some laboratory test, including UA with culture  Will support with parenteral fluids via hypodermoclysis  Patient's daughter is aware that her mother's condition is guarded  Will continue with close monitoring  Further recommendations pending results of testing

## 2025-05-16 ENCOUNTER — NURSING HOME VISIT (OUTPATIENT)
Dept: GERIATRICS | Facility: OTHER | Age: 85
End: 2025-05-16
Payer: COMMERCIAL

## 2025-05-16 DIAGNOSIS — Z79.4 TYPE 2 DIABETES MELLITUS WITHOUT COMPLICATION, WITH LONG-TERM CURRENT USE OF INSULIN (HCC): Chronic | ICD-10-CM

## 2025-05-16 DIAGNOSIS — Z93.59 CHRONIC SUPRAPUBIC CATHETER (HCC): ICD-10-CM

## 2025-05-16 DIAGNOSIS — G62.9 NEUROPATHY: ICD-10-CM

## 2025-05-16 DIAGNOSIS — F33.9 RECURRENT MAJOR DEPRESSIVE DISORDER, REMISSION STATUS UNSPECIFIED (HCC): ICD-10-CM

## 2025-05-16 DIAGNOSIS — I27.82 CHRONIC PULMONARY EMBOLISM WITHOUT ACUTE COR PULMONALE, UNSPECIFIED PULMONARY EMBOLISM TYPE (HCC): ICD-10-CM

## 2025-05-16 DIAGNOSIS — E05.90 HYPERTHYROIDISM: Chronic | ICD-10-CM

## 2025-05-16 DIAGNOSIS — G30.1 LATE ONSET ALZHEIMER'S DISEASE WITH BEHAVIORAL DISTURBANCE (HCC): Chronic | ICD-10-CM

## 2025-05-16 DIAGNOSIS — Z86.718 HISTORY OF DVT (DEEP VEIN THROMBOSIS): Chronic | ICD-10-CM

## 2025-05-16 DIAGNOSIS — I48.91 ATRIAL FIBRILLATION, UNSPECIFIED TYPE (HCC): Chronic | ICD-10-CM

## 2025-05-16 DIAGNOSIS — E11.9 TYPE 2 DIABETES MELLITUS WITHOUT COMPLICATION, WITH LONG-TERM CURRENT USE OF INSULIN (HCC): Chronic | ICD-10-CM

## 2025-05-16 DIAGNOSIS — F02.818 LATE ONSET ALZHEIMER'S DISEASE WITH BEHAVIORAL DISTURBANCE (HCC): Chronic | ICD-10-CM

## 2025-05-16 DIAGNOSIS — R63.4 WEIGHT LOSS: Primary | ICD-10-CM

## 2025-05-16 PROCEDURE — 99309 SBSQ NF CARE MODERATE MDM 30: CPT

## 2025-05-16 NOTE — PROGRESS NOTES
[facility]  [address]  [place] [address]  Facility: [facility]  [facility]  Long Term Care: POS 32    NAME: Josefina Castro  AGE: 84 y.o. SEX: female    DATE OF ENCOUNTER: 5/16/25    Code status:  No CPR, DNH    Assessment and Plan     1. Weight loss  Assessment & Plan:  5/6/2025: 127 pounds  3/7/2025: 155.2 pounds  Significant weight loss  Meal completions variable, mostly 25 to 50%  Dietary service following and liberalized her diet  Continue Glucerna supplements twice daily  Will assess labs for further workup  Continue to monitor for acute changes in patient condition  2. Neuropathy  Assessment & Plan:  Patient has no pain on exam, usually only has pain when out of bed to chair  Plan to wean down gabapentin to 100 mg 3 times daily  Continue to monitor patient condition and how she tolerates lower dose  Patient also taking duloxetine which helps with pain  Continue to monitor for acute changes in patient condition  3. Chronic pulmonary embolism without acute cor pulmonale, unspecified pulmonary embolism type (HCC)  Assessment & Plan:  Continue lifelong Xarelto  4. Atrial fibrillation, unspecified type (HCC)  Assessment & Plan:  Continue Xarelto for anticoagulation  Continue digoxin for rate control  Repeat digoxin level on 5/20/2025  Continue to monitor for acute changes in patient condition  5. Hyperthyroidism  Assessment & Plan:  Continue methimazole  With recent weight loss, will repeat TSH and free T4 next week  6. Type 2 diabetes mellitus without complication, with long-term current use of insulin (HCC)  Assessment & Plan:  Continue insulin glargine 10 units at bedtime daily  Discontinue sliding scale insulin with meals  Blood sugars remain stable and often below 200  Continue to monitor and trend blood sugars  With poor appetite, may need to decrease insulin dose in the near future  Continue to monitor for acute changes in patient  condition  7. Late onset Alzheimer's disease with behavioral disturbance (HCC)  Assessment & Plan:  Continue to reorient, redirect, and reassure patient  Maintain delirium precautions and sleep/wake cycle  Avoid deliriogenic medications  Limit interruptions at night as medically appropriate  Patient continues to require 24/7 assistance with ADLs  Encourage adequate nutrition and hydration  Continue to monitor for acute changes in condition  8. Chronic suprapubic catheter (HCC)  Assessment & Plan:  Secondary to neurogenic bladder  Followed by urology service outpatient  24 French suprapubic catheter to be changed every 6 weeks and as needed  Nursing to continue with local care and catheter maintenance  9. Recurrent major depressive disorder, remission status unspecified (HCC)  Assessment & Plan:  Continue duloxetine and olanzapine  Care in collaboration with psychiatry service  Will discuss with psychiatry service about starting mirtazapine to help with appetite  Continue to monitor for acute changes in patient condition  10. History of DVT (deep vein thrombosis)  Assessment & Plan:  Continue lifelong Xarelto secondary to decreased mobility and high likelihood of recurrence  History of DVT  Continue to monitor for acute changes in patient condition      All medications and routine orders were reviewed and updated as needed.    Plan discussed with: Patient and Nurse    Chief Complaint     Follow-up of chronic conditions    History of Present Illness     Patient is being seen today to follow-up on chronic medical conditions in collaboration with nursing staff.  Overall patient is doing well and remains stable.  Patient reports feeling well with some abdominal pain.  Dietary service consulted recently due to significant weight loss.  Appetite is poor and documentation shows that she usually eats about 25 to 50% of her meals and occasionally 75 to 100%.  She has a chronic Agarwal catheter and she is having regular bowel  movements.    The following portions of the patient's history were reviewed and updated as appropriate: current medications, past family history, past medical history, past social history, past surgical history and problem list.    Allergies:  Allergies[1]    Review of Systems     Review of Systems   Unable to perform ROS: Dementia       Medications and orders     All medications reviewed and updated in care home EMR.      Objective     Vitals: BP 94/53, temp 97.9 °F, HR 74, RR 18, O2 96%, weight 127 pounds    Physical Exam  Vitals and nursing note reviewed.   Constitutional:       General: She is awake. She is not in acute distress.     Appearance: She is well-groomed. She is not ill-appearing or diaphoretic.   HENT:      Head: Normocephalic and atraumatic.      Mouth/Throat:      Mouth: Mucous membranes are dry.     Cardiovascular:      Rate and Rhythm: Normal rate and regular rhythm.      Heart sounds: Normal heart sounds, S1 normal and S2 normal.   Pulmonary:      Effort: Pulmonary effort is normal. No respiratory distress.      Breath sounds: Normal breath sounds.   Abdominal:      General: Bowel sounds are decreased. There is distension.      Tenderness: There is no abdominal tenderness.      Comments: Abdomen distended at baseline   Genitourinary:     Comments: Suprapubic catheter draining yellow, cloudy urine with sediment    Musculoskeletal:         General: No deformity or signs of injury.      Right lower leg: No edema.      Left lower leg: No edema.     Skin:     General: Skin is warm and dry.      Findings: No bruising or erythema.     Neurological:      Mental Status: She is alert. Mental status is at baseline.      Motor: Weakness present.      Gait: Gait abnormal.     Psychiatric:         Attention and Perception: Attention normal.         Speech: Speech is delayed.         Behavior: Behavior is cooperative.       Pertinent Laboratory/Diagnostic Studies:     All labs and studies were reviewed  "please see chart or hospital paperwork for details.    Portions of the record may have been created with voice recognition software.  Occasional wrong word or \"sound a like\" substitutions may have occurred due to the inherent limitations of voice recognition software.  Read the chart carefully and recognize, using context, where substitutions have occurred.    MARIANA Cloud  Geriatric Medicine  5/16/25         [1]  Allergies  Allergen Reactions   • Risperdal [Risperidone]    "

## 2025-05-16 NOTE — ASSESSMENT & PLAN NOTE
5/6/2025: 127 pounds  3/7/2025: 155.2 pounds  Significant weight loss  Meal completions variable, mostly 25 to 50%  Dietary service following and liberalized her diet  Continue Glucerna supplements twice daily  Will assess labs for further workup  Continue to monitor for acute changes in patient condition  
Continue Xarelto for anticoagulation  Continue digoxin for rate control  Repeat digoxin level on 5/20/2025  Continue to monitor for acute changes in patient condition  
Continue duloxetine and olanzapine  Care in collaboration with psychiatry service  Will discuss with psychiatry service about starting mirtazapine to help with appetite  Continue to monitor for acute changes in patient condition  
Continue insulin glargine 10 units at bedtime daily  Discontinue sliding scale insulin with meals  Blood sugars remain stable and often below 200  Continue to monitor and trend blood sugars  With poor appetite, may need to decrease insulin dose in the near future  Continue to monitor for acute changes in patient condition  
Continue lifelong Xarelto  
Continue lifelong Xarelto secondary to decreased mobility and high likelihood of recurrence  History of DVT  Continue to monitor for acute changes in patient condition  
Continue methimazole  With recent weight loss, will repeat TSH and free T4 next week  
Continue to reorient, redirect, and reassure patient  Maintain delirium precautions and sleep/wake cycle  Avoid deliriogenic medications  Limit interruptions at night as medically appropriate  Patient continues to require 24/7 assistance with ADLs  Encourage adequate nutrition and hydration  Continue to monitor for acute changes in condition  
Patient has no pain on exam, usually only has pain when out of bed to chair  Plan to wean down gabapentin to 100 mg 3 times daily  Continue to monitor patient condition and how she tolerates lower dose  Patient also taking duloxetine which helps with pain  Continue to monitor for acute changes in patient condition  
Secondary to neurogenic bladder  Followed by urology service outpatient  24 French suprapubic catheter to be changed every 6 weeks and as needed  Nursing to continue with local care and catheter maintenance  
PEDIATRICS

## 2025-05-21 ENCOUNTER — NURSING HOME VISIT (OUTPATIENT)
Dept: GERIATRICS | Facility: OTHER | Age: 85
End: 2025-05-21
Payer: COMMERCIAL

## 2025-05-21 DIAGNOSIS — G30.1 LATE ONSET ALZHEIMER'S DISEASE WITH BEHAVIORAL DISTURBANCE (HCC): Primary | Chronic | ICD-10-CM

## 2025-05-21 DIAGNOSIS — R54 FRAILTY SYNDROME IN GERIATRIC PATIENT: Chronic | ICD-10-CM

## 2025-05-21 DIAGNOSIS — F02.818 LATE ONSET ALZHEIMER'S DISEASE WITH BEHAVIORAL DISTURBANCE (HCC): Primary | Chronic | ICD-10-CM

## 2025-05-21 DIAGNOSIS — Z66 DNR (DO NOT RESUSCITATE): ICD-10-CM

## 2025-05-21 PROCEDURE — 99310 SBSQ NF CARE HIGH MDM 45: CPT | Performed by: INTERNAL MEDICINE

## 2025-06-02 ENCOUNTER — HOSPICE ADMISSION (OUTPATIENT)
Dept: HOME HOSPICE | Facility: HOSPICE | Age: 85
End: 2025-06-02
Payer: MEDICARE

## 2025-06-03 ENCOUNTER — HOME CARE VISIT (OUTPATIENT)
Dept: HOME HEALTH SERVICES | Facility: HOME HEALTHCARE | Age: 85
End: 2025-06-03
Payer: MEDICARE

## 2025-06-04 ENCOUNTER — HOME CARE VISIT (OUTPATIENT)
Dept: HOME HEALTH SERVICES | Facility: HOME HEALTHCARE | Age: 85
End: 2025-06-04
Attending: INTERNAL MEDICINE
Payer: MEDICARE

## 2025-06-04 VITALS
DIASTOLIC BLOOD PRESSURE: 64 MMHG | RESPIRATION RATE: 16 BRPM | WEIGHT: 127 LBS | SYSTOLIC BLOOD PRESSURE: 104 MMHG | BODY MASS INDEX: 18.81 KG/M2 | OXYGEN SATURATION: 99 % | HEIGHT: 69 IN | HEART RATE: 83 BPM | TEMPERATURE: 98.1 F

## 2025-06-04 PROCEDURE — G0299 HHS/HOSPICE OF RN EA 15 MIN: HCPCS

## 2025-06-05 ENCOUNTER — HOME CARE VISIT (OUTPATIENT)
Dept: HOME HEALTH SERVICES | Facility: HOME HEALTHCARE | Age: 85
End: 2025-06-05
Payer: MEDICARE

## 2025-06-05 PROCEDURE — G0156 HHCP-SVS OF AIDE,EA 15 MIN: HCPCS

## 2025-06-05 PROCEDURE — G0300 HHS/HOSPICE OF LPN EA 15 MIN: HCPCS

## 2025-06-05 PROCEDURE — G0155 HHCP-SVS OF CSW,EA 15 MIN: HCPCS

## 2025-06-06 ENCOUNTER — HOME CARE VISIT (OUTPATIENT)
Dept: HOME HOSPICE | Facility: HOSPICE | Age: 85
End: 2025-06-06
Payer: MEDICARE

## 2025-06-06 ENCOUNTER — HOME CARE VISIT (OUTPATIENT)
Dept: HOME HEALTH SERVICES | Facility: HOME HEALTHCARE | Age: 85
End: 2025-06-06
Payer: MEDICARE

## 2025-06-06 PROCEDURE — G0156 HHCP-SVS OF AIDE,EA 15 MIN: HCPCS

## 2025-06-06 NOTE — ASSESSMENT & PLAN NOTE
Secondary to her chronic medical conditions  She continues to require 24/7 care/support for ADLs  Will continue to provide this care/support at the nursing facility  Will continue to monitor for change in her condition

## 2025-06-06 NOTE — PROGRESS NOTES
[facility]  [address]  [place] [address]  Facility: [facility]  [facility]  30  Acute visit    NAME: Josefina Castro  AGE: 84 y.o. SEX: female    DATE OF ENCOUNTER: May 21, 2025.    Code status:  DNR/DNH    Assessment and Plan     1. Late onset Alzheimer's disease with behavioral disturbance (HCC)  Assessment & Plan:  I was able to reach out to the patient's daughter, Vale, during my visit and we discussed her mother's current condition  We reviewed her increased need for assistance with her ADLs and decreased meal completion with weight loss despite the assistance  Reviewed that she is less interactive with her environment  Vale notes that she has seen a slow decline in her mother, also  We reviewed her mother's prognosis and that I feel her life expectancy is less than 6 months.  She is in agreement.  We reviewed the Medicare hospice benefit  Vale agrees with a hospice consultation to see if her mother will qualify  Will continue with monitoring for change in her condition  2. Frailty syndrome in geriatric patient  Assessment & Plan:  Secondary to her chronic medical conditions  She continues to require 24/7 care/support for ADLs  Will continue to provide this care/support at the nursing facility  Will continue to monitor for change in her condition  3. DNR (do not resuscitate)      All medications and routine orders were reviewed and updated as needed.    Plan discussed with: Nursing staff and daughter, Vale.    I have spent a total time of 50 minutes in caring for this patient on the day of the visit/encounter including Prognosis, Risks and benefits of tx options, Instructions for management, Patient and family education, Impressions, Counseling / Coordination of care, Reviewing/placing orders in the medical record (including tests, medications, and/or procedures), Obtaining or reviewing history  , and Communicating with other  healthcare professionals .     Chief Complaint     She is seen for an acute visit at the request of nursing to evaluate report of overall general decline in her functional status.    History of Present Illness     She is an 84-year-old woman with the comorbidities of late onset Alzheimer's disease with agitation, insulin requiring type 2 diabetes mellitus, vitamin D deficiency, paroxysmal atrial fibrillation, insulin requiring type 2 diabetes mellitus, bipolar disorder, pseudogout, neurogenic bladder with suprapubic tube in place, and frailty secondary to her chronic medical conditions who is seen for an acute visit at the request of nursing to be evaluated for a general decline in her functional/clinical status.  She is requiring assistance with eating.  Review her chart shows she is only completing 26 to 50% of her meals, routinely.  She has lost 17 pounds in the last 6 months and overall 30 pounds in the last year.    The following portions of the patient's history were reviewed and updated as appropriate: current medications, past family history, past medical history, past social history, past surgical history and problem list.    Allergies:  Allergies[1]    Review of Systems     Review of Systems   Unable to perform ROS: Dementia       Medications and orders     All medications reviewed and updated in [facility] EMR.      Objective     Vitals: Recent vitals: Weight 127 pounds (decreased 17 pounds in last 6 months and a total of 30 pounds in the last 12 months), pulse 86, blood pressure 94/53, fasting fingerstick blood sugar 113    Physical Exam  Vitals reviewed. Exam conducted with a chaperone present.   Constitutional:       General: She is awake.      Appearance: She is well-developed. She is cachectic. She is not toxic-appearing or diaphoretic.      Comments: She appears comfortable lying in bed, stated age, and very frail.     Psychiatric:         Attention and Perception: She is inattentive.          "Speech: She is noncommunicative.       Pertinent Laboratory/Diagnostic Studies:     The following labs were reviewed please see chart or hospital paperwork for details.    May 20, 2025:    CBC with differential: WBC count 5.6, hemoglobin 11.4, hematocrit 34.4, platelet count 277,000, MCV 79    CMP: Sodium 142, potassium 3.5, BUN 17, creatinine 0.74, fasting blood sugar 122, EGFR 78, LFTs WNL    Digoxin: 0.5    TSH: 2.4 (WNL)    Free T4: 1.09 (WNL)    - New orders written and reviewed with nursing staff.      Portions of the record may have been created with voice recognition software.  Occasional wrong word or \"sound a like\" substitutions may have occurred due to the inherent limitations of voice recognition software.  Read the chart carefully and recognize, using context, where substitutions have occurred.    Luiz Decker M.D.             [1]   Allergies  Allergen Reactions    Risperdal [Risperidone]      "

## 2025-06-06 NOTE — ASSESSMENT & PLAN NOTE
I was able to reach out to the patient's daughter, Vale, during my visit and we discussed her mother's current condition  We reviewed her increased need for assistance with her ADLs and decreased meal completion with weight loss despite the assistance  Reviewed that she is less interactive with her environment  Vale notes that she has seen a slow decline in her mother, also  We reviewed her mother's prognosis and that I feel her life expectancy is less than 6 months.  She is in agreement.  We reviewed the Medicare hospice benefit  Vale agrees with a hospice consultation to see if her mother will qualify  Will continue with monitoring for change in her condition

## 2025-06-07 ENCOUNTER — HOME CARE VISIT (OUTPATIENT)
Dept: HOME HEALTH SERVICES | Facility: HOME HEALTHCARE | Age: 85
End: 2025-06-07
Payer: MEDICARE

## 2025-06-07 ENCOUNTER — HOME CARE VISIT (OUTPATIENT)
Dept: HOME HOSPICE | Facility: HOSPICE | Age: 85
End: 2025-06-07
Payer: MEDICARE

## 2025-06-07 PROCEDURE — G0299 HHS/HOSPICE OF RN EA 15 MIN: HCPCS

## 2025-06-09 ENCOUNTER — HOME CARE VISIT (OUTPATIENT)
Dept: HOME HEALTH SERVICES | Facility: HOME HEALTHCARE | Age: 85
End: 2025-06-09
Payer: MEDICARE

## 2025-06-09 PROCEDURE — G0156 HHCP-SVS OF AIDE,EA 15 MIN: HCPCS

## 2025-06-10 ENCOUNTER — HOME CARE VISIT (OUTPATIENT)
Dept: HOME HEALTH SERVICES | Facility: HOME HEALTHCARE | Age: 85
End: 2025-06-10
Payer: MEDICARE

## 2025-06-10 PROCEDURE — G0156 HHCP-SVS OF AIDE,EA 15 MIN: HCPCS

## 2025-06-11 ENCOUNTER — HOME CARE VISIT (OUTPATIENT)
Dept: HOME HEALTH SERVICES | Facility: HOME HEALTHCARE | Age: 85
End: 2025-06-11
Payer: MEDICARE

## 2025-06-11 PROCEDURE — G0156 HHCP-SVS OF AIDE,EA 15 MIN: HCPCS

## 2025-06-12 ENCOUNTER — HOME CARE VISIT (OUTPATIENT)
Dept: HOME HEALTH SERVICES | Facility: HOME HEALTHCARE | Age: 85
End: 2025-06-12
Payer: MEDICARE

## 2025-06-12 PROCEDURE — G0156 HHCP-SVS OF AIDE,EA 15 MIN: HCPCS

## 2025-06-12 PROCEDURE — G0300 HHS/HOSPICE OF LPN EA 15 MIN: HCPCS

## 2025-06-13 ENCOUNTER — HOME CARE VISIT (OUTPATIENT)
Dept: HOME HOSPICE | Facility: HOSPICE | Age: 85
End: 2025-06-13
Payer: MEDICARE

## 2025-06-14 PROCEDURE — 10330057 MEDICATION, GENERAL

## 2025-06-18 ENCOUNTER — HOME CARE VISIT (OUTPATIENT)
Dept: HOME HEALTH SERVICES | Facility: HOME HEALTHCARE | Age: 85
End: 2025-06-18
Payer: MEDICARE

## 2025-06-18 VITALS
RESPIRATION RATE: 20 BRPM | HEART RATE: 80 BPM | DIASTOLIC BLOOD PRESSURE: 68 MMHG | TEMPERATURE: 98 F | SYSTOLIC BLOOD PRESSURE: 100 MMHG

## 2025-06-18 PROCEDURE — G0156 HHCP-SVS OF AIDE,EA 15 MIN: HCPCS

## 2025-06-18 PROCEDURE — 10330057 MEDICATION, GENERAL

## 2025-06-18 PROCEDURE — G0299 HHS/HOSPICE OF RN EA 15 MIN: HCPCS

## 2025-06-20 ENCOUNTER — HOME CARE VISIT (OUTPATIENT)
Dept: HOME HEALTH SERVICES | Facility: HOME HEALTHCARE | Age: 85
End: 2025-06-20
Payer: MEDICARE

## 2025-06-20 PROCEDURE — G0156 HHCP-SVS OF AIDE,EA 15 MIN: HCPCS

## 2025-06-21 ENCOUNTER — HOME CARE VISIT (OUTPATIENT)
Dept: HOME HEALTH SERVICES | Facility: HOME HEALTHCARE | Age: 85
End: 2025-06-21
Payer: MEDICARE

## 2025-06-21 PROCEDURE — G0156 HHCP-SVS OF AIDE,EA 15 MIN: HCPCS

## 2025-06-23 ENCOUNTER — DOCUMENTATION (OUTPATIENT)
Dept: OTHER | Facility: HOSPICE | Age: 85
End: 2025-06-23

## 2025-06-23 ENCOUNTER — HOME CARE VISIT (OUTPATIENT)
Dept: HOME HEALTH SERVICES | Facility: HOME HEALTHCARE | Age: 85
End: 2025-06-23
Payer: MEDICARE

## 2025-06-23 PROCEDURE — G0300 HHS/HOSPICE OF LPN EA 15 MIN: HCPCS

## 2025-06-24 ENCOUNTER — HOME CARE VISIT (OUTPATIENT)
Dept: HOME HEALTH SERVICES | Facility: HOME HEALTHCARE | Age: 85
End: 2025-06-24
Payer: MEDICARE

## 2025-06-24 PROCEDURE — G0156 HHCP-SVS OF AIDE,EA 15 MIN: HCPCS

## 2025-06-24 PROCEDURE — 10330057 MEDICATION, GENERAL

## 2025-06-25 ENCOUNTER — HOME CARE VISIT (OUTPATIENT)
Dept: HOME HEALTH SERVICES | Facility: HOME HEALTHCARE | Age: 85
End: 2025-06-25
Payer: MEDICARE

## 2025-06-25 VITALS
RESPIRATION RATE: 16 BRPM | SYSTOLIC BLOOD PRESSURE: 106 MMHG | TEMPERATURE: 97.8 F | DIASTOLIC BLOOD PRESSURE: 60 MMHG | HEART RATE: 80 BPM

## 2025-06-25 PROCEDURE — G0156 HHCP-SVS OF AIDE,EA 15 MIN: HCPCS

## 2025-06-25 PROCEDURE — G0299 HHS/HOSPICE OF RN EA 15 MIN: HCPCS

## 2025-06-26 ENCOUNTER — HOME CARE VISIT (OUTPATIENT)
Dept: HOME HEALTH SERVICES | Facility: HOME HEALTHCARE | Age: 85
End: 2025-06-26
Payer: MEDICARE

## 2025-06-26 PROCEDURE — G0156 HHCP-SVS OF AIDE,EA 15 MIN: HCPCS

## 2025-06-27 ENCOUNTER — HOME CARE VISIT (OUTPATIENT)
Dept: HOME HEALTH SERVICES | Facility: HOME HEALTHCARE | Age: 85
End: 2025-06-27
Payer: MEDICARE

## 2025-06-27 PROCEDURE — G0156 HHCP-SVS OF AIDE,EA 15 MIN: HCPCS

## 2025-06-30 ENCOUNTER — HOME CARE VISIT (OUTPATIENT)
Dept: HOME HEALTH SERVICES | Facility: HOME HEALTHCARE | Age: 85
End: 2025-06-30
Payer: MEDICARE

## 2025-06-30 PROCEDURE — G0156 HHCP-SVS OF AIDE,EA 15 MIN: HCPCS

## 2025-07-01 ENCOUNTER — HOME CARE VISIT (OUTPATIENT)
Dept: HOME HEALTH SERVICES | Facility: HOME HEALTHCARE | Age: 85
End: 2025-07-01
Payer: MEDICARE

## 2025-07-01 PROCEDURE — G0156 HHCP-SVS OF AIDE,EA 15 MIN: HCPCS

## 2025-07-01 PROCEDURE — 10330057 MEDICATION, GENERAL

## 2025-07-02 ENCOUNTER — HOME CARE VISIT (OUTPATIENT)
Dept: HOME HEALTH SERVICES | Facility: HOME HEALTHCARE | Age: 85
End: 2025-07-02
Payer: MEDICARE

## 2025-07-02 VITALS
HEART RATE: 80 BPM | RESPIRATION RATE: 16 BRPM | TEMPERATURE: 98 F | DIASTOLIC BLOOD PRESSURE: 72 MMHG | SYSTOLIC BLOOD PRESSURE: 112 MMHG

## 2025-07-02 PROCEDURE — G0156 HHCP-SVS OF AIDE,EA 15 MIN: HCPCS

## 2025-07-02 PROCEDURE — G0299 HHS/HOSPICE OF RN EA 15 MIN: HCPCS

## 2025-07-03 ENCOUNTER — HOME CARE VISIT (OUTPATIENT)
Dept: HOME HEALTH SERVICES | Facility: HOME HEALTHCARE | Age: 85
End: 2025-07-03
Payer: MEDICARE

## 2025-07-03 VITALS — HEART RATE: 72 BPM | TEMPERATURE: 97.6 F | RESPIRATION RATE: 12 BRPM

## 2025-07-03 PROCEDURE — G0300 HHS/HOSPICE OF LPN EA 15 MIN: HCPCS

## 2025-07-06 ENCOUNTER — HOME CARE VISIT (OUTPATIENT)
Dept: HOME HOSPICE | Facility: HOSPICE | Age: 85
End: 2025-07-06
Payer: MEDICARE

## 2025-07-06 ENCOUNTER — TELEPHONE (OUTPATIENT)
Dept: OTHER | Facility: OTHER | Age: 85
End: 2025-07-06

## 2025-07-06 NOTE — TELEPHONE ENCOUNTER
Reason for Call: Has not seen mother since Wednesday and she has a bruise on her jaw and chin. Also, has a poke fran on her face. Vale would like to know who saw her last.    Caller's Name: Vale Resendiz    Caller's Relationship to Patient: Daughter    Caller's Callback Number:[phone]    Home Health OR Hospice Patient: Home Hospice

## 2025-07-07 ENCOUNTER — HOME CARE VISIT (OUTPATIENT)
Dept: HOME HEALTH SERVICES | Facility: HOME HEALTHCARE | Age: 85
End: 2025-07-07
Payer: MEDICARE

## 2025-07-07 VITALS — HEART RATE: 92 BPM | DIASTOLIC BLOOD PRESSURE: 64 MMHG | SYSTOLIC BLOOD PRESSURE: 118 MMHG | RESPIRATION RATE: 20 BRPM

## 2025-07-07 PROCEDURE — G0299 HHS/HOSPICE OF RN EA 15 MIN: HCPCS

## 2025-07-07 PROCEDURE — G0156 HHCP-SVS OF AIDE,EA 15 MIN: HCPCS

## 2025-07-08 ENCOUNTER — HOME CARE VISIT (OUTPATIENT)
Dept: HOME HEALTH SERVICES | Facility: HOME HEALTHCARE | Age: 85
End: 2025-07-08
Payer: MEDICARE

## 2025-07-08 PROCEDURE — G0155 HHCP-SVS OF CSW,EA 15 MIN: HCPCS

## 2025-07-08 PROCEDURE — G0156 HHCP-SVS OF AIDE,EA 15 MIN: HCPCS

## 2025-07-09 ENCOUNTER — HOME CARE VISIT (OUTPATIENT)
Dept: HOME HEALTH SERVICES | Facility: HOME HEALTHCARE | Age: 85
End: 2025-07-09
Payer: MEDICARE

## 2025-07-09 PROCEDURE — G0156 HHCP-SVS OF AIDE,EA 15 MIN: HCPCS

## 2025-07-10 ENCOUNTER — HOME CARE VISIT (OUTPATIENT)
Dept: HOME HEALTH SERVICES | Facility: HOME HEALTHCARE | Age: 85
End: 2025-07-10
Payer: MEDICARE

## 2025-07-10 ENCOUNTER — NURSING HOME VISIT (OUTPATIENT)
Dept: GERIATRICS | Facility: OTHER | Age: 85
End: 2025-07-10
Payer: MEDICARE

## 2025-07-10 DIAGNOSIS — F02.818 LATE ONSET ALZHEIMER'S DISEASE WITH BEHAVIORAL DISTURBANCE (HCC): Chronic | ICD-10-CM

## 2025-07-10 DIAGNOSIS — Z93.59 CHRONIC SUPRAPUBIC CATHETER (HCC): ICD-10-CM

## 2025-07-10 DIAGNOSIS — E05.90 HYPERTHYROIDISM: Chronic | ICD-10-CM

## 2025-07-10 DIAGNOSIS — K59.00 CONSTIPATION, UNSPECIFIED CONSTIPATION TYPE: ICD-10-CM

## 2025-07-10 DIAGNOSIS — G30.1 LATE ONSET ALZHEIMER'S DISEASE WITH BEHAVIORAL DISTURBANCE (HCC): Chronic | ICD-10-CM

## 2025-07-10 DIAGNOSIS — R54 FRAILTY SYNDROME IN GERIATRIC PATIENT: Chronic | ICD-10-CM

## 2025-07-10 DIAGNOSIS — F31.30 BIPOLAR AFFECTIVE DISORDER, CURRENT EPISODE DEPRESSED, CURRENT EPISODE SEVERITY UNSPECIFIED (HCC): Chronic | ICD-10-CM

## 2025-07-10 DIAGNOSIS — Z51.5 HOSPICE CARE PATIENT: ICD-10-CM

## 2025-07-10 DIAGNOSIS — Z66 DNR (DO NOT RESUSCITATE): ICD-10-CM

## 2025-07-10 DIAGNOSIS — I48.91 ATRIAL FIBRILLATION, UNSPECIFIED TYPE (HCC): Primary | Chronic | ICD-10-CM

## 2025-07-10 PROCEDURE — G0156 HHCP-SVS OF AIDE,EA 15 MIN: HCPCS

## 2025-07-10 PROCEDURE — 99309 SBSQ NF CARE MODERATE MDM 30: CPT | Performed by: INTERNAL MEDICINE

## 2025-07-10 PROCEDURE — G0299 HHS/HOSPICE OF RN EA 15 MIN: HCPCS

## 2025-07-11 ENCOUNTER — HOME CARE VISIT (OUTPATIENT)
Dept: HOME HEALTH SERVICES | Facility: HOME HEALTHCARE | Age: 85
End: 2025-07-11
Payer: MEDICARE

## 2025-07-11 PROCEDURE — G0156 HHCP-SVS OF AIDE,EA 15 MIN: HCPCS

## 2025-07-13 ENCOUNTER — HOME CARE VISIT (OUTPATIENT)
Dept: HOME HEALTH SERVICES | Facility: HOME HEALTHCARE | Age: 85
End: 2025-07-13
Payer: MEDICARE

## 2025-07-13 VITALS — RESPIRATION RATE: 18 BRPM | SYSTOLIC BLOOD PRESSURE: 102 MMHG | HEART RATE: 70 BPM | DIASTOLIC BLOOD PRESSURE: 50 MMHG

## 2025-07-13 PROCEDURE — G0299 HHS/HOSPICE OF RN EA 15 MIN: HCPCS

## 2025-07-15 ENCOUNTER — TELEPHONE (OUTPATIENT)
Dept: HOME HEALTH SERVICES | Facility: HOME HEALTHCARE | Age: 85
End: 2025-07-15

## 2025-07-16 ENCOUNTER — HOME CARE VISIT (OUTPATIENT)
Dept: HOME HEALTH SERVICES | Facility: HOME HEALTHCARE | Age: 85
End: 2025-07-16
Payer: MEDICARE

## 2025-07-16 VITALS
TEMPERATURE: 97.7 F | DIASTOLIC BLOOD PRESSURE: 70 MMHG | HEART RATE: 98 BPM | SYSTOLIC BLOOD PRESSURE: 118 MMHG | RESPIRATION RATE: 16 BRPM

## 2025-07-16 DIAGNOSIS — F31.9 BIPOLAR AFFECTIVE DISORDER, REMISSION STATUS UNSPECIFIED (HCC): Chronic | ICD-10-CM

## 2025-07-16 DIAGNOSIS — Z51.5 HOSPICE CARE PATIENT: Primary | ICD-10-CM

## 2025-07-16 DIAGNOSIS — F32.9 MAJOR DEPRESSIVE DISORDER, REMISSION STATUS UNSPECIFIED, UNSPECIFIED WHETHER RECURRENT: ICD-10-CM

## 2025-07-16 PROCEDURE — G0156 HHCP-SVS OF AIDE,EA 15 MIN: HCPCS

## 2025-07-16 PROCEDURE — G0300 HHS/HOSPICE OF LPN EA 15 MIN: HCPCS

## 2025-07-16 RX ORDER — MORPHINE SULFATE 100 MG/5ML
20 SOLUTION ORAL EVERY 4 HOURS PRN
COMMUNITY
End: 2025-07-16 | Stop reason: SDUPTHER

## 2025-07-16 RX ORDER — DIVALPROEX SODIUM 125 MG/1
125 CAPSULE, COATED PELLETS ORAL EVERY 12 HOURS SCHEDULED
Qty: 30 CAPSULE | Refills: 2 | Status: SHIPPED | OUTPATIENT
Start: 2025-07-16

## 2025-07-16 RX ORDER — MORPHINE SULFATE 100 MG/5ML
20 SOLUTION ORAL EVERY 4 HOURS PRN
Qty: 30 ML | Refills: 0 | Status: SHIPPED | OUTPATIENT
Start: 2025-07-16

## 2025-07-16 NOTE — TELEPHONE ENCOUNTER
7/16/2025 2:10 PM  [facility] facility patient requests new medication ordered for symptom management.  Filled electronically via Epic as per [place] State Law.    Requested Prescriptions     Signed Prescriptions Disp Refills    divalproex sodium (DEPAKOTE SPRINKLE) 125 MG capsule 30 capsule 2     Sig: Take 1 capsule (125 mg total) by mouth every 12 (twelve) hours     Authorizing Provider: SERENA HYLTON CRNP  [facility]  Hospice Answering Service: [phone]

## 2025-07-17 ENCOUNTER — HOME CARE VISIT (OUTPATIENT)
Dept: HOME HEALTH SERVICES | Facility: HOME HEALTHCARE | Age: 85
End: 2025-07-17
Payer: MEDICARE

## 2025-07-17 PROCEDURE — G0156 HHCP-SVS OF AIDE,EA 15 MIN: HCPCS

## 2025-07-18 ENCOUNTER — HOME CARE VISIT (OUTPATIENT)
Dept: HOME HEALTH SERVICES | Facility: HOME HEALTHCARE | Age: 85
End: 2025-07-18
Payer: MEDICARE

## 2025-07-18 PROCEDURE — G0156 HHCP-SVS OF AIDE,EA 15 MIN: HCPCS

## 2025-07-20 ENCOUNTER — HOME CARE VISIT (OUTPATIENT)
Dept: HOME HEALTH SERVICES | Facility: HOME HEALTHCARE | Age: 85
End: 2025-07-20
Payer: MEDICARE

## 2025-07-20 VITALS — RESPIRATION RATE: 18 BRPM | SYSTOLIC BLOOD PRESSURE: 118 MMHG | DIASTOLIC BLOOD PRESSURE: 68 MMHG | HEART RATE: 72 BPM

## 2025-07-20 PROCEDURE — G0299 HHS/HOSPICE OF RN EA 15 MIN: HCPCS

## 2025-07-21 ENCOUNTER — HOME CARE VISIT (OUTPATIENT)
Dept: HOME HEALTH SERVICES | Facility: HOME HEALTHCARE | Age: 85
End: 2025-07-21
Payer: MEDICARE

## 2025-07-21 PROCEDURE — G0156 HHCP-SVS OF AIDE,EA 15 MIN: HCPCS

## 2025-07-22 ENCOUNTER — HOME CARE VISIT (OUTPATIENT)
Dept: HOME HEALTH SERVICES | Facility: HOME HEALTHCARE | Age: 85
End: 2025-07-22
Payer: MEDICARE

## 2025-07-22 PROCEDURE — G0156 HHCP-SVS OF AIDE,EA 15 MIN: HCPCS

## 2025-07-23 ENCOUNTER — HOME CARE VISIT (OUTPATIENT)
Dept: HOME HEALTH SERVICES | Facility: HOME HEALTHCARE | Age: 85
End: 2025-07-23
Payer: MEDICARE

## 2025-07-23 VITALS
HEART RATE: 80 BPM | DIASTOLIC BLOOD PRESSURE: 60 MMHG | RESPIRATION RATE: 16 BRPM | TEMPERATURE: 97.7 F | SYSTOLIC BLOOD PRESSURE: 118 MMHG

## 2025-07-23 PROCEDURE — G0300 HHS/HOSPICE OF LPN EA 15 MIN: HCPCS

## 2025-07-25 ENCOUNTER — HOME CARE VISIT (OUTPATIENT)
Dept: HOME HEALTH SERVICES | Facility: HOME HEALTHCARE | Age: 85
End: 2025-07-25
Payer: MEDICARE

## 2025-07-25 PROCEDURE — G0156 HHCP-SVS OF AIDE,EA 15 MIN: HCPCS

## 2025-07-27 ENCOUNTER — HOME CARE VISIT (OUTPATIENT)
Dept: HOME HEALTH SERVICES | Facility: HOME HEALTHCARE | Age: 85
End: 2025-07-27
Payer: MEDICARE

## 2025-07-27 PROCEDURE — G0156 HHCP-SVS OF AIDE,EA 15 MIN: HCPCS

## 2025-07-28 ENCOUNTER — HOME CARE VISIT (OUTPATIENT)
Dept: HOME HEALTH SERVICES | Facility: HOME HEALTHCARE | Age: 85
End: 2025-07-28
Payer: MEDICARE

## 2025-07-28 PROCEDURE — G0155 HHCP-SVS OF CSW,EA 15 MIN: HCPCS

## 2025-07-29 ENCOUNTER — HOME CARE VISIT (OUTPATIENT)
Dept: HOME HEALTH SERVICES | Facility: HOME HEALTHCARE | Age: 85
End: 2025-07-29
Payer: MEDICARE

## 2025-07-29 VITALS
SYSTOLIC BLOOD PRESSURE: 110 MMHG | DIASTOLIC BLOOD PRESSURE: 56 MMHG | RESPIRATION RATE: 16 BRPM | HEART RATE: 72 BPM | TEMPERATURE: 97.7 F

## 2025-07-29 PROBLEM — Z51.5 HOSPICE CARE PATIENT: Status: ACTIVE | Noted: 2025-07-10

## 2025-07-29 PROCEDURE — G0299 HHS/HOSPICE OF RN EA 15 MIN: HCPCS

## 2025-07-30 ENCOUNTER — HOME CARE VISIT (OUTPATIENT)
Dept: HOME HEALTH SERVICES | Facility: HOME HEALTHCARE | Age: 85
End: 2025-07-30
Payer: MEDICARE

## 2025-07-30 PROCEDURE — G0156 HHCP-SVS OF AIDE,EA 15 MIN: HCPCS

## 2025-07-31 ENCOUNTER — HOME CARE VISIT (OUTPATIENT)
Dept: HOME HEALTH SERVICES | Facility: HOME HEALTHCARE | Age: 85
End: 2025-07-31
Payer: MEDICARE

## 2025-07-31 PROCEDURE — G0156 HHCP-SVS OF AIDE,EA 15 MIN: HCPCS

## 2025-08-04 ENCOUNTER — HOME CARE VISIT (OUTPATIENT)
Dept: HOME HEALTH SERVICES | Facility: HOME HEALTHCARE | Age: 85
End: 2025-08-04
Payer: MEDICARE

## 2025-08-04 PROCEDURE — G0156 HHCP-SVS OF AIDE,EA 15 MIN: HCPCS

## 2025-08-05 ENCOUNTER — HOME CARE VISIT (OUTPATIENT)
Dept: HOME HEALTH SERVICES | Facility: HOME HEALTHCARE | Age: 85
End: 2025-08-05
Payer: MEDICARE

## 2025-08-05 PROCEDURE — G0156 HHCP-SVS OF AIDE,EA 15 MIN: HCPCS

## 2025-08-06 ENCOUNTER — HOME CARE VISIT (OUTPATIENT)
Dept: HOME HEALTH SERVICES | Facility: HOME HEALTHCARE | Age: 85
End: 2025-08-06
Payer: MEDICARE

## 2025-08-06 VITALS — DIASTOLIC BLOOD PRESSURE: 58 MMHG | HEART RATE: 72 BPM | SYSTOLIC BLOOD PRESSURE: 112 MMHG | RESPIRATION RATE: 18 BRPM

## 2025-08-06 PROCEDURE — G0299 HHS/HOSPICE OF RN EA 15 MIN: HCPCS

## 2025-08-06 PROCEDURE — G0156 HHCP-SVS OF AIDE,EA 15 MIN: HCPCS

## 2025-08-07 ENCOUNTER — HOME CARE VISIT (OUTPATIENT)
Dept: HOME HEALTH SERVICES | Facility: HOME HEALTHCARE | Age: 85
End: 2025-08-07
Payer: MEDICARE

## 2025-08-07 PROCEDURE — G0156 HHCP-SVS OF AIDE,EA 15 MIN: HCPCS

## 2025-08-08 ENCOUNTER — HOME CARE VISIT (OUTPATIENT)
Dept: HOME HEALTH SERVICES | Facility: HOME HEALTHCARE | Age: 85
End: 2025-08-08
Payer: MEDICARE

## 2025-08-08 PROCEDURE — G0156 HHCP-SVS OF AIDE,EA 15 MIN: HCPCS

## 2025-08-11 ENCOUNTER — HOME CARE VISIT (OUTPATIENT)
Dept: HOME HEALTH SERVICES | Facility: HOME HEALTHCARE | Age: 85
End: 2025-08-11
Payer: MEDICARE

## 2025-08-12 ENCOUNTER — HOME CARE VISIT (OUTPATIENT)
Dept: HOME HEALTH SERVICES | Facility: HOME HEALTHCARE | Age: 85
End: 2025-08-12
Payer: MEDICARE

## 2025-08-13 ENCOUNTER — HOME CARE VISIT (OUTPATIENT)
Dept: HOME HEALTH SERVICES | Facility: HOME HEALTHCARE | Age: 85
End: 2025-08-13
Payer: MEDICARE

## 2025-08-13 PROCEDURE — G0299 HHS/HOSPICE OF RN EA 15 MIN: HCPCS

## 2025-08-14 ENCOUNTER — HOME CARE VISIT (OUTPATIENT)
Dept: HOME HEALTH SERVICES | Facility: HOME HEALTHCARE | Age: 85
End: 2025-08-14
Payer: MEDICARE

## 2025-08-18 ENCOUNTER — HOME CARE VISIT (OUTPATIENT)
Dept: HOME HEALTH SERVICES | Facility: HOME HEALTHCARE | Age: 85
End: 2025-08-18
Payer: MEDICARE

## 2025-08-18 PROCEDURE — G0156 HHCP-SVS OF AIDE,EA 15 MIN: HCPCS

## 2025-08-19 ENCOUNTER — HOME CARE VISIT (OUTPATIENT)
Dept: HOME HEALTH SERVICES | Facility: HOME HEALTHCARE | Age: 85
End: 2025-08-19
Payer: MEDICARE

## 2025-08-19 PROCEDURE — G0156 HHCP-SVS OF AIDE,EA 15 MIN: HCPCS

## 2025-08-20 ENCOUNTER — HOME CARE VISIT (OUTPATIENT)
Dept: HOME HEALTH SERVICES | Facility: HOME HEALTHCARE | Age: 85
End: 2025-08-20
Payer: MEDICARE

## 2025-08-20 PROCEDURE — G0156 HHCP-SVS OF AIDE,EA 15 MIN: HCPCS

## 2025-08-21 ENCOUNTER — HOME CARE VISIT (OUTPATIENT)
Dept: HOME HEALTH SERVICES | Facility: HOME HEALTHCARE | Age: 85
End: 2025-08-21
Payer: MEDICARE

## 2025-08-21 VITALS — WEIGHT: 120.5 LBS | BODY MASS INDEX: 17.79 KG/M2

## 2025-08-21 PROCEDURE — G0156 HHCP-SVS OF AIDE,EA 15 MIN: HCPCS

## 2025-08-22 ENCOUNTER — HOME CARE VISIT (OUTPATIENT)
Dept: HOME HEALTH SERVICES | Facility: HOME HEALTHCARE | Age: 85
End: 2025-08-22
Payer: MEDICARE

## 2025-08-22 PROCEDURE — G0156 HHCP-SVS OF AIDE,EA 15 MIN: HCPCS
